# Patient Record
Sex: MALE | Race: WHITE | Employment: OTHER | URBAN - METROPOLITAN AREA
[De-identification: names, ages, dates, MRNs, and addresses within clinical notes are randomized per-mention and may not be internally consistent; named-entity substitution may affect disease eponyms.]

---

## 2017-04-11 ENCOUNTER — HOSPITAL ENCOUNTER (OUTPATIENT)
Dept: CT IMAGING | Facility: HOSPITAL | Age: 52
Discharge: HOME | End: 2017-04-11
Attending: SURGERY
Payer: MEDICARE

## 2017-04-11 ENCOUNTER — APPOINTMENT (OUTPATIENT)
Dept: LAB | Facility: HOSPITAL | Age: 52
End: 2017-04-11
Attending: SURGERY
Payer: MEDICARE

## 2017-04-11 DIAGNOSIS — C78.6 PERITONEAL CARCINOMATOSIS (HCC): ICD-10-CM

## 2017-04-11 DIAGNOSIS — M10.9 GOUTY ARTHRITIS: ICD-10-CM

## 2017-04-11 PROCEDURE — 84550 ASSAY OF BLOOD/URIC ACID: CPT

## 2017-04-11 PROCEDURE — 74177 CT ABD & PELVIS W/CONTRAST: CPT

## 2017-04-11 PROCEDURE — 86304 IMMUNOASSAY TUMOR CA 125: CPT

## 2017-04-11 PROCEDURE — 36415 COLL VENOUS BLD VENIPUNCTURE: CPT

## 2017-04-11 PROCEDURE — 86301 IMMUNOASSAY TUMOR CA 19-9: CPT

## 2017-04-11 PROCEDURE — 80053 COMPREHEN METABOLIC PANEL: CPT

## 2017-04-11 PROCEDURE — 82378 CARCINOEMBRYONIC ANTIGEN: CPT

## 2017-04-26 ENCOUNTER — TELEPHONE (OUTPATIENT)
Dept: SURGERY | Facility: HOSPITAL | Age: 52
End: 2017-04-26

## 2017-04-26 DIAGNOSIS — G89.29 CHRONIC CHEST WALL PAIN: Primary | ICD-10-CM

## 2017-04-26 DIAGNOSIS — R07.89 CHRONIC CHEST WALL PAIN: Primary | ICD-10-CM

## 2017-04-26 PROCEDURE — 86200 CCP ANTIBODY: CPT | Performed by: INTERNAL MEDICINE

## 2017-04-26 NOTE — TELEPHONE ENCOUNTER
Talked with patient re CT  He is seeing me tomorrow  He asked for medical lucius and I declined  Pain clinic referral suggested

## 2017-05-03 ENCOUNTER — OFFICE VISIT (OUTPATIENT)
Dept: SURGERY | Facility: CLINIC | Age: 52
End: 2017-05-03

## 2017-05-03 VITALS
RESPIRATION RATE: 18 BRPM | SYSTOLIC BLOOD PRESSURE: 131 MMHG | OXYGEN SATURATION: 99 % | TEMPERATURE: 99 F | WEIGHT: 275.19 LBS | DIASTOLIC BLOOD PRESSURE: 85 MMHG | HEIGHT: 73 IN | HEART RATE: 68 BPM | BODY MASS INDEX: 36.47 KG/M2

## 2017-05-03 DIAGNOSIS — C78.6 PSEUDOMYXOMA PERITONEI (HCC): Primary | ICD-10-CM

## 2017-05-03 PROCEDURE — 99213 OFFICE O/P EST LOW 20 MIN: CPT | Performed by: SURGERY

## 2017-05-03 NOTE — PROGRESS NOTES
[facility] Surgical Oncology    Patient Name:  Goldie Mc   YOB: 1965   Gender:  Male   Appt Date:  5/3/2017   Provider:  Yessica Vargas MD   Insurance:  MEDICARE PART A&B     Bebe Hilliard MD 11/19/2015: MRI--->Stable serosal T2 hyperintense lesion on the medial aspect of right lobe of the liver measuring 20 x 20 mm. This lesion demonstrates restricted diffusion and rim enhancement.  This also stable lesion on the right posterior liver near the History:  Reviewed:  Past Medical History   Diagnosis Date   • GERD    • GOUT    • Calculus of kidney    • Other and unspecified personal history of malignant neoplasm       Pseuodmyxoma Peritoneii stage IV s/p DAISY, omenentectomy, right hemicolectomy, an • Cystoscopy,remv calculus,simple  11/20/2012     Procedure: CYSTOSCOPY WITH REMOVAL OF STENT;  Surgeon: Thelma Land MD;  Location: [address] [place] [address]   • Patient with preoperative order for iv antibiotic surgical site infect  11/20/2012     Proc Procedure: LUMBAR EPIDURAL;  Surgeon: Lennice Felty, MD;  Location: [facility] [place] [facility] PAIN MANAG[facility]   • Fluor gid & loclzj ndl/cath spi dx/ther njx  4/15/2014     Procedure: LUMBAR EPIDURAL;  Surgeon: Lennice Felty, MD;  Location: [address] [place] [address] • Patient withough preoperative order for iv antibiotic surgical site infection prophylaxis.  N/A 1/25/2016     Procedure: COLONOSCOPY, POSSIBLE BIOPSY, POSSIBLE POLYPECTOMY 80769;  Surgeon: Aby Brewster MD;  Location: [facility] Abdomen: Inspection and Palpation: no masses, tenderness (no guarding, no rebound), or CVA tenderness and soft and non-distended. Liver: no hepatomegaly. Spleen: no splenomegaly. Hernia: none palpable. Musculoskeletal: Extremities: no edema.    Skin: no j

## 2017-05-12 ENCOUNTER — OFFICE VISIT (OUTPATIENT)
Dept: SURGERY | Facility: CLINIC | Age: 52
End: 2017-05-12

## 2017-05-12 VITALS
DIASTOLIC BLOOD PRESSURE: 76 MMHG | HEIGHT: 72 IN | WEIGHT: 275 LBS | HEART RATE: 85 BPM | RESPIRATION RATE: 18 BRPM | SYSTOLIC BLOOD PRESSURE: 132 MMHG | OXYGEN SATURATION: 96 % | BODY MASS INDEX: 37.25 KG/M2

## 2017-05-12 DIAGNOSIS — C78.6 PSEUDOMYXOMA PERITONEI (HCC): Primary | ICD-10-CM

## 2017-05-12 PROCEDURE — 99204 OFFICE O/P NEW MOD 45 MIN: CPT | Performed by: ANESTHESIOLOGY

## 2017-05-12 NOTE — PROGRESS NOTES
HPI:    Patient ID: Thomas Price is a 45year old male.     HPI    Review of Systems         Current Outpatient Prescriptions:  ULORIC 80 MG Oral Tab TAKE 1 TABLET BY MOUTH EVERY DAY Disp: 90 tablet Rfl: 1   lidocaine (LIDODERM) 5 % External Patch Pl

## 2017-05-12 NOTE — PATIENT INSTRUCTIONS
Refill policies:    • Allow 2 business days for refills; controlled substances may take longer.   • Contact your pharmacy at least 5 days prior to running out of medication and have them send an electronic request or submit request through the “request re insurance carrier to obtain pre-certification or prior authorization. Unfortunately, [facility] has seen an increase in denial of payment even though the procedure/test has been pre-certified.   You are strongly encouraged to contact your insurance carrier to v

## 2017-05-13 NOTE — PROGRESS NOTES
Name: Lubna Del Rio   : [birth year]   DOS: 2017     Chief complaint: Abdominal pain.     History of present illness:  Lubna Del Rio is a 45year old male complaining of pain in the left side of the upper abdomen abutting the costal margin Extended Release 12 hour Abuse-Deterrent Take 1 tablet by mouth Q12H. Disp: 30 tablet Rfl: 0   Tadalafil (CIALIS) 20 MG Oral Tab Take 1 tablet by mouth daily as needed for Erectile Dysfunction.  Disp: 64 tablet Rfl: 0         Past Surgical History    GASTRO REMOVAL OF STENT;  Surgeon: Vanessa Ryan MD;  Location: [address] [place] [address]    PATIENT WITH PREOPERATIVE ORDER FOR IV ANTIBIOTIC SURGICAL SITE INFECT  11/20/2012    Comment Procedure: CYSTOSCOPY WITH REMOVAL OF STENT;  Surgeon: Vanessa Ryan MD; EPIDURAL;  Surgeon: Christa Germain MD;  Location: [address] [place] [address] NDL/CATH SPI DX/THER Anthony Ernesto Bender 83  4/15/2014    Comment Procedure: LUMBAR EPIDURAL;  Surgeon: Christa Germain MD;  Location: [address] [place] [address] PREOPERATIVE ORDER FOR IV ANTIBIOTIC SURGICAL SITE INFECTION PROPHYLAXIS.  N/A 1/25/2016    Comment Procedure: COLONOSCOPY, POSSIBLE BIOPSY, POSSIBLE POLYPECTOMY 51591;  Surgeon: Erica Hanna MD;  Location: [address] [place] [address] suicidal ideation. Physical examination: Caleb Ye is a 45year old male not in acute distress  /76 mmHg  Pulse 85  Resp 18  Ht 72\"  Wt 275 lb  BMI 37.29 kg/m2  SpO2 96%   The patient is awake, alert, oriented and corporative.  She has a normal af Assessment:   . Plan: At this point patient is going to be seen by his oncologist and and surgeon to see if he will need surgery for the tumor in the stomach. After surgery his pain might get better because tumor is pressing on the costal margin.

## 2017-05-17 ENCOUNTER — TELEPHONE (OUTPATIENT)
Dept: SURGERY | Facility: CLINIC | Age: 52
End: 2017-05-17

## 2017-05-17 NOTE — TELEPHONE ENCOUNTER
Received notification that Rx was not covered by insurance. Alternative #1 is covered. New rx is needed.      Diclofenac Sodium 1.5% Solution #300ml (2 bottles)--apply 40 drops (1.3 ml) to each painful area (up to 2 areas) three to four times daily (max 10

## 2017-05-18 NOTE — TELEPHONE ENCOUNTER
Compound Pain Cream faxed to [facility], fax confirmation received.

## 2017-08-03 ENCOUNTER — APPOINTMENT (OUTPATIENT)
Dept: LAB | Facility: HOSPITAL | Age: 52
End: 2017-08-03
Attending: SURGERY
Payer: MEDICARE

## 2017-08-03 ENCOUNTER — HOSPITAL ENCOUNTER (OUTPATIENT)
Dept: CT IMAGING | Facility: HOSPITAL | Age: 52
Discharge: HOME | End: 2017-08-03
Attending: SURGERY
Payer: MEDICARE

## 2017-08-03 DIAGNOSIS — C78.6 PSEUDOMYXOMA PERITONEI (HCC): ICD-10-CM

## 2017-08-03 LAB
CANCER AG 125 (CA125): 4.8 U/ML (ref ?–35)
CARBOHYDRATE AG 19-9: 18.9 U/ML (ref 1.2–35)
CEA: 5.5 NG/ML (ref 0.5–5)

## 2017-08-03 PROCEDURE — 86301 IMMUNOASSAY TUMOR CA 19-9: CPT

## 2017-08-03 PROCEDURE — 36415 COLL VENOUS BLD VENIPUNCTURE: CPT

## 2017-08-03 PROCEDURE — 74177 CT ABD & PELVIS W/CONTRAST: CPT | Performed by: SURGERY

## 2017-08-03 PROCEDURE — 86304 IMMUNOASSAY TUMOR CA 125: CPT

## 2017-08-03 PROCEDURE — 82378 CARCINOEMBRYONIC ANTIGEN: CPT

## 2017-08-09 ENCOUNTER — OFFICE VISIT (OUTPATIENT)
Dept: SURGERY | Facility: CLINIC | Age: 52
End: 2017-08-09

## 2017-08-09 VITALS
DIASTOLIC BLOOD PRESSURE: 84 MMHG | HEIGHT: 73 IN | BODY MASS INDEX: 37.11 KG/M2 | TEMPERATURE: 98 F | WEIGHT: 280 LBS | OXYGEN SATURATION: 94 % | RESPIRATION RATE: 18 BRPM | SYSTOLIC BLOOD PRESSURE: 134 MMHG | HEART RATE: 56 BPM

## 2017-08-09 DIAGNOSIS — C78.6 PSEUDOMYXOMA PERITONEI (HCC): Primary | ICD-10-CM

## 2017-08-09 PROCEDURE — 99213 OFFICE O/P EST LOW 20 MIN: CPT | Performed by: SURGERY

## 2017-08-09 RX ORDER — LIDOCAINE AND PRILOCAINE 25; 25 MG/G; MG/G
CREAM TOPICAL
Refills: 11 | COMMUNITY
Start: 2017-05-18

## 2017-08-09 RX ORDER — OXYCODONE HYDROCHLORIDE AND ACETAMINOPHEN 5; 325 MG/1; MG/1
TABLET ORAL
COMMUNITY
Start: 2014-11-28 | End: 2017-10-18 | Stop reason: ALTCHOICE

## 2017-08-09 NOTE — PROGRESS NOTES
[facility] Surgical Oncology    Patient Name:  Neha Roger   YOB: 1965   Gender:  Male   Appt Date:  8/9/2017   Provider:  Dhruv Quintero MD   Insurance:  MEDICARE PART A&B     Sharona Douglas MD 11/19/2015: MRI--->Stable serosal T2 hyperintense lesion on the medial aspect of right lobe of the liver measuring 20 x 20 mm. This lesion demonstrates restricted diffusion and rim enhancement.  This also stable lesion on the right posterior liver near the /84 (BP Location: Right arm, Patient Position: Sitting, Cuff Size: adult)   Pulse 56   Temp 97.7 °F (36.5 °C) (Tympanic)   Resp 18   Ht 1.854 m (6' 1\")   Wt 127 kg (280 lb)   SpO2 94%   BMI 36.94 kg/m²      Medications Reviewed:    Current Outpatien polyp; repeat 5 yrs (hyperplastic but hx of adenoma)   • Colonoscopy,biopsy N/A 1/25/2016    Procedure: COLONOSCOPY, POSSIBLE BIOPSY, POSSIBLE POLYPECTOMY 78016;  Surgeon: Aby Brewster MD;  Location: [address] [place] [address]   • Colonoscopy,jayden le Procedure: LUMBAR EPIDURAL;  Surgeon: Taj Parra MD;  Location: [facility] FOR PAIN MANAGEMENT   • Injection, w/wo contrast, dx/therapeutic substance, epidural/subarachnoid; lumbar/sacral  4/29/2014    Procedure: LUMBAR EPIDURAL;  Surgeon: Ann Sharif Procedure: LUMBAR EPIDURAL;  Surgeon: Michelle Yang MD;  Location: [address] [place] [address]   • Patient documented not to have experienced any of the following events N/A 1/25/2016    Procedure: COLONOSCOPY, POSSIBLE BIOPSY, POSSIBLE POLYPECTOMY 4 Smoking status: Former Smoker                                                              Packs/day: 1.50      Years: 16.00        Types: Cigarettes, Cigars     Quit date: 12/6/2016  Smokeless tobacco: Never Used                      Comment: quit Dec 201 BOWEL/MESENTERY:  There is increase in the size of the cystic lesion deforming the inferior body of the stomach. This measures 4.4 x 3.5 x 3.2 cm, previously measured 3.8 x 3.2 x 2.7 cm.  A cystic lesion along the anterior capsule of the inferior right   lo

## 2017-08-28 ENCOUNTER — CHARTING TRANS (OUTPATIENT)
Dept: OTHER | Age: 52
End: 2017-08-28

## 2017-08-28 ASSESSMENT — PAIN SCALES - GENERAL: PAINLEVEL_OUTOF10: 3

## 2017-10-05 DIAGNOSIS — C78.6 PSEUDOMYXOMA PERITONEI (HCC): Primary | ICD-10-CM

## 2017-10-10 ENCOUNTER — APPOINTMENT (OUTPATIENT)
Dept: LAB | Facility: HOSPITAL | Age: 52
End: 2017-10-10
Attending: SURGERY
Payer: MEDICARE

## 2017-10-10 ENCOUNTER — HOSPITAL ENCOUNTER (OUTPATIENT)
Dept: CT IMAGING | Facility: HOSPITAL | Age: 52
Discharge: HOME | End: 2017-10-10
Attending: SURGERY
Payer: MEDICARE

## 2017-10-10 DIAGNOSIS — C78.6 PSEUDOMYXOMA PERITONEI (HCC): ICD-10-CM

## 2017-10-10 PROCEDURE — 86301 IMMUNOASSAY TUMOR CA 19-9: CPT

## 2017-10-10 PROCEDURE — 36415 COLL VENOUS BLD VENIPUNCTURE: CPT

## 2017-10-10 PROCEDURE — 86304 IMMUNOASSAY TUMOR CA 125: CPT

## 2017-10-10 PROCEDURE — 82378 CARCINOEMBRYONIC ANTIGEN: CPT

## 2017-10-10 PROCEDURE — 74177 CT ABD & PELVIS W/CONTRAST: CPT | Performed by: SURGERY

## 2017-10-18 ENCOUNTER — OFFICE VISIT (OUTPATIENT)
Dept: SURGERY | Facility: CLINIC | Age: 52
End: 2017-10-18

## 2017-10-18 VITALS
HEART RATE: 76 BPM | HEIGHT: 73 IN | RESPIRATION RATE: 16 BRPM | BODY MASS INDEX: 36.34 KG/M2 | TEMPERATURE: 98 F | WEIGHT: 274.19 LBS | DIASTOLIC BLOOD PRESSURE: 87 MMHG | SYSTOLIC BLOOD PRESSURE: 128 MMHG

## 2017-10-18 DIAGNOSIS — C78.6 PSEUDOMYXOMA PERITONEI (HCC): Primary | ICD-10-CM

## 2017-10-18 PROCEDURE — 99214 OFFICE O/P EST MOD 30 MIN: CPT | Performed by: SURGERY

## 2017-10-18 NOTE — PROGRESS NOTES
[facility] Surgical Oncology    Patient Name:  Kenia Mullen   YOB: 1965   Gender:  Male   Appt Date:  10/18/2017   Provider:  Bere Wong MD   Insurance:  MEDICARE PART A&B     Vishal Reyna MD 5/12/2015: CT--->Index subcapsular focus along posterior margin of right lobe 1 cm by 0.6 cm. Previously measured 1.1 X 0.8 cm. Additional implant along the posterior margin of the liver right lobe 1.5 x 0.9 cm (previously 1.8 x 1.3 cm).  Several small martín /87 (BP Location: Left arm, Patient Position: Sitting, Cuff Size: adult)   Pulse 76   Temp 98.4 °F (36.9 °C)   Resp 16   Ht 1.854 m (6' 1\")   Wt 124.4 kg (274 lb 3.2 oz)   BMI 36.18 kg/m²      Medications Reviewed:    Current Outpatient Prescription Procedure: COLONOSCOPY, POSSIBLE BIOPSY, POSSIBLE POLYPECTOMY 73292;  Surgeon: Shay Boogie MD;  Location: [address] [place] [address]   • Colonoscopy,jayden osorio,snare  11/5/2012    Procedure: ESOPHAGOGASTRODUODENOSCOPY, COLONOSCOPY, POSSIBLE BIOPSY, P • Injection, w/wo contrast, dx/therapeutic substance, epidural/subarachnoid; lumbar/sacral  4/29/2014    Procedure: LUMBAR EPIDURAL;  Surgeon: Cherylene Deters, MD;  Location: [facility]   • Ir ureter tube removal via ileal conduit     • • Patient documented not to have experienced any of the following events N/A 1/25/2016    Procedure: COLONOSCOPY, POSSIBLE BIOPSY, POSSIBLE POLYPECTOMY 21447;  Surgeon: Shay Boogie MD;  Location: [address] [place] [address]   • Patient with preoperati Packs/day: 1.50      Years: 16.00        Types: Cigarettes, Cigars     Quit date: 12/6/2016  Smokeless tobacco: Never Used                      Comment: quit Dec 2016  Alcohol use:  No               Reviewed:  Family History   Problem Relation Age of Ons 10/10/17: CEA  4.9;  CA19.9  23.5;    2.6     Procedure(s):  None     Assessment / Plan:  (C78.6) Pseudomyxoma peritonei     CT findings reviewed. There appears progression of disease  Options and recommendations discussed.   The case was discussed at I will STOP taking the medications listed below when I get home from the hospital:  None

## 2018-01-05 ENCOUNTER — HOSPITAL ENCOUNTER (OUTPATIENT)
Dept: CT IMAGING | Facility: HOSPITAL | Age: 53
Discharge: HOME | End: 2018-01-05
Attending: SURGERY
Payer: MEDICARE

## 2018-01-05 ENCOUNTER — APPOINTMENT (OUTPATIENT)
Dept: LAB | Facility: HOSPITAL | Age: 53
End: 2018-01-05
Attending: SURGERY
Payer: MEDICARE

## 2018-01-05 ENCOUNTER — CHARTING TRANS (OUTPATIENT)
Dept: OTHER | Age: 53
End: 2018-01-05

## 2018-01-05 DIAGNOSIS — C78.6 PSEUDOMYXOMA PERITONEI (HCC): ICD-10-CM

## 2018-01-05 LAB — CEA: 3.8 NG/ML (ref 0.5–5)

## 2018-01-05 PROCEDURE — 82378 CARCINOEMBRYONIC ANTIGEN: CPT

## 2018-01-05 PROCEDURE — 74177 CT ABD & PELVIS W/CONTRAST: CPT | Performed by: SURGERY

## 2018-01-05 PROCEDURE — 36415 COLL VENOUS BLD VENIPUNCTURE: CPT

## 2018-01-05 ASSESSMENT — PAIN SCALES - GENERAL: PAINLEVEL_OUTOF10: 0

## 2018-01-17 ENCOUNTER — OFFICE VISIT (OUTPATIENT)
Dept: SURGERY | Facility: CLINIC | Age: 53
End: 2018-01-17

## 2018-01-17 ENCOUNTER — NURSE ONLY (OUTPATIENT)
Dept: HEMATOLOGY/ONCOLOGY | Facility: HOSPITAL | Age: 53
End: 2018-01-17
Attending: SURGERY
Payer: MEDICARE

## 2018-01-17 VITALS
DIASTOLIC BLOOD PRESSURE: 79 MMHG | HEIGHT: 73 IN | WEIGHT: 268 LBS | TEMPERATURE: 97 F | HEART RATE: 66 BPM | OXYGEN SATURATION: 95 % | BODY MASS INDEX: 35.52 KG/M2 | SYSTOLIC BLOOD PRESSURE: 124 MMHG | RESPIRATION RATE: 16 BRPM

## 2018-01-17 DIAGNOSIS — C78.6 PSEUDOMYXOMA PERITONEI (HCC): Primary | ICD-10-CM

## 2018-01-17 DIAGNOSIS — C78.6 PSEUDOMYXOMA PERITONEI (HCC): ICD-10-CM

## 2018-01-17 LAB — CEA: 4.1 NG/ML (ref 0.5–5)

## 2018-01-17 PROCEDURE — 36415 COLL VENOUS BLD VENIPUNCTURE: CPT

## 2018-01-17 PROCEDURE — 99213 OFFICE O/P EST LOW 20 MIN: CPT | Performed by: SURGERY

## 2018-01-17 PROCEDURE — 82378 CARCINOEMBRYONIC ANTIGEN: CPT

## 2018-01-17 NOTE — PROGRESS NOTES
[facility] Surgical Oncology    Patient Name:  Sotero Damico   YOB: 1965   Gender:  Male   Appt Date:  1/17/2018   Provider:  Cici Cheung MD   Insurance:  [address] [place] [address]  Primary Care [facility] 3.6  11/19/2015: MRI--->Stable serosal T2 hyperintense lesion on the medial aspect of right lobe of the liver measuring 20 x 20 mm. This lesion demonstrates restricted diffusion and rim enhancement.  This also stable lesion on the right posterior liver near Location: Right arm, Patient Position: Sitting, Cuff Size: adult)   Pulse 66   Temp (!) 97.4 °F (36.3 °C) (Tympanic)   Resp 16   Ht 1.854 m (6' 1\")   Wt 121.6 kg (268 lb)   SpO2 95%   BMI 35.36 kg/m²      Medications Reviewed:    Current Outpatient Prescr polypectomy  11/12    polyps; no cancer; repeat 3 yrs   • Colonoscopy & polypectomy  1/16    polyp; repeat 5 yrs (hyperplastic but hx of adenoma)   • Colonoscopy,biopsy N/A 1/25/2016    Procedure: COLONOSCOPY, POSSIBLE BIOPSY, POSSIBLE POLYPECTOMY 04483; epidural/subarachnoid; lumbar/sacral  4/15/2014    Procedure: LUMBAR EPIDURAL;  Surgeon: Lennice Felty, MD;  Location: [facility]IN [facility]   • Injection, w/wo contrast, dx/therapeutic substance, epidural/subarachnoid; lumbar/sacral  4/29/2014 4/29/2014    Procedure: LUMBAR EPIDURAL;  Surgeon: Lennice Felty, MD;  Location: [address] [place] [address]   • Patient documented not to have experienced any of the following events N/A 1/25/2016    Procedure: COLONOSCOPY, POSSIBLE BIOPSY, POSSIBLE P History:  Smoking status: Former Smoker                                                              Packs/day: 1.50      Years: 16.00        Types: Cigarettes, Cigars     Quit date: 12/6/2016  Smokeless tobacco: Never Used                      Comment: qu retroperitoneal lymph nodes are stable. No significant change since prior exam.  Nonobstructing calculi in both kidneys are stable.   1/5/17 CEA: 3.8   and CA 19-9 pending     Procedure(s):  None     Assessment / Plan:  (C78.6) Pseudomyxoma periton

## 2018-02-12 DIAGNOSIS — M79.2 NEUROPATHIC PAIN: ICD-10-CM

## 2018-02-12 RX ORDER — LIDOCAINE 50 MG/G
PATCH TOPICAL
Qty: 30 PATCH | Refills: 0 | Status: SHIPPED | OUTPATIENT
Start: 2018-02-12 | End: 2019-10-23 | Stop reason: ALTCHOICE

## 2018-02-12 RX ORDER — LIDOCAINE 50 MG/G
PATCH TOPICAL
Qty: 30 PATCH | Refills: 0 | Status: SHIPPED | OUTPATIENT
Start: 2018-02-12 | End: 2018-07-11

## 2018-02-13 ENCOUNTER — CHARTING TRANS (OUTPATIENT)
Dept: OTHER | Age: 53
End: 2018-02-13

## 2018-02-13 ASSESSMENT — PAIN SCALES - GENERAL: PAINLEVEL_OUTOF10: 0

## 2018-03-19 ENCOUNTER — CHARTING TRANS (OUTPATIENT)
Dept: OTHER | Age: 53
End: 2018-03-19

## 2018-03-19 ASSESSMENT — PAIN SCALES - GENERAL: PAINLEVEL_OUTOF10: 3

## 2018-07-06 ENCOUNTER — TELEPHONE (OUTPATIENT)
Dept: SURGERY | Facility: CLINIC | Age: 53
End: 2018-07-06

## 2018-07-06 DIAGNOSIS — C48.2 PERITONEAL CARCINOMA (HCC): Primary | ICD-10-CM

## 2018-07-06 DIAGNOSIS — C78.6 PSEUDOMYXOMA PERITONEI (HCC): ICD-10-CM

## 2018-07-06 NOTE — TELEPHONE ENCOUNTER
Confirmed with patient he will have imaging and tumor markers complete prior to office visit with Dr. Dashawn Pat on 7/11/18. Patient verbalized understanding of plan.

## 2018-07-10 ENCOUNTER — LAB ENCOUNTER (OUTPATIENT)
Dept: LAB | Facility: HOSPITAL | Age: 53
End: 2018-07-10
Attending: SURGERY
Payer: MEDICARE

## 2018-07-10 ENCOUNTER — HOSPITAL ENCOUNTER (OUTPATIENT)
Dept: CT IMAGING | Facility: HOSPITAL | Age: 53
Discharge: HOME | End: 2018-07-10
Attending: SURGERY
Payer: MEDICARE

## 2018-07-10 DIAGNOSIS — N18.9 CHRONIC RENAL IMPAIRMENT, UNSPECIFIED CKD STAGE: ICD-10-CM

## 2018-07-10 DIAGNOSIS — C78.89 SECONDARY MALIGNANT NEOPLASM OF OTHER DIGESTIVE ORGANS (HCC): ICD-10-CM

## 2018-07-10 DIAGNOSIS — C48.2 PERITONEAL CARCINOMA (HCC): Primary | ICD-10-CM

## 2018-07-10 DIAGNOSIS — C78.6 PSEUDOMYXOMA PERITONEI (HCC): ICD-10-CM

## 2018-07-10 DIAGNOSIS — Z08 ENCOUNTER FOR FOLLOW-UP EXAMINATION AFTER COMPLETED TREATMENT FOR MALIGNANT NEOPLASM: ICD-10-CM

## 2018-07-10 DIAGNOSIS — C18.1 ADENOCARCINOMA OF APPENDIX (HCC): ICD-10-CM

## 2018-07-10 DIAGNOSIS — M10.9 GOUTY ARTHRITIS: ICD-10-CM

## 2018-07-10 DIAGNOSIS — C48.2 PERITONEAL CARCINOMA (HCC): ICD-10-CM

## 2018-07-10 LAB
ALBUMIN SERPL-MCNC: 3.5 G/DL (ref 3.5–4.8)
ALP LIVER SERPL-CCNC: 106 U/L (ref 45–117)
ALT SERPL-CCNC: 27 U/L (ref 17–63)
AST SERPL-CCNC: 17 U/L (ref 15–41)
BILIRUB SERPL-MCNC: 0.7 MG/DL (ref 0.1–2)
BUN BLD-MCNC: 12 MG/DL (ref 8–20)
CALCIUM BLD-MCNC: 9.3 MG/DL (ref 8.3–10.3)
CANCER AG 125: 3.2 U/ML (ref ?–35)
CARBOHYDRATE AG 19-9: 11.5 U/ML (ref ?–37)
CEA: 3.8 NG/ML (ref 0.5–5)
CHLORIDE: 106 MMOL/L (ref 101–111)
CO2: 28 MMOL/L (ref 22–32)
CREAT BLD-MCNC: 1.33 MG/DL (ref 0.7–1.3)
CREAT SERPL-MCNC: 1.3 MG/DL (ref 0.7–1.3)
GLUCOSE BLD-MCNC: 117 MG/DL (ref 70–99)
M PROTEIN MFR SERPL ELPH: 7.8 G/DL (ref 6.1–8.3)
POTASSIUM SERPL-SCNC: 3.9 MMOL/L (ref 3.6–5.1)
SODIUM SERPL-SCNC: 140 MMOL/L (ref 136–144)
URIC ACID: 4.8 MG/DL (ref 2.4–8.7)

## 2018-07-10 PROCEDURE — 86301 IMMUNOASSAY TUMOR CA 19-9: CPT

## 2018-07-10 PROCEDURE — 86304 IMMUNOASSAY TUMOR CA 125: CPT

## 2018-07-10 PROCEDURE — 82565 ASSAY OF CREATININE: CPT

## 2018-07-10 PROCEDURE — 36415 COLL VENOUS BLD VENIPUNCTURE: CPT

## 2018-07-10 PROCEDURE — 80053 COMPREHEN METABOLIC PANEL: CPT

## 2018-07-10 PROCEDURE — 74177 CT ABD & PELVIS W/CONTRAST: CPT | Performed by: SURGERY

## 2018-07-10 PROCEDURE — 84550 ASSAY OF BLOOD/URIC ACID: CPT

## 2018-07-10 PROCEDURE — 82378 CARCINOEMBRYONIC ANTIGEN: CPT

## 2018-07-11 ENCOUNTER — OFFICE VISIT (OUTPATIENT)
Dept: SURGERY | Facility: CLINIC | Age: 53
End: 2018-07-11

## 2018-07-11 VITALS
OXYGEN SATURATION: 95 % | HEIGHT: 73 IN | RESPIRATION RATE: 20 BRPM | DIASTOLIC BLOOD PRESSURE: 89 MMHG | SYSTOLIC BLOOD PRESSURE: 145 MMHG | WEIGHT: 260 LBS | BODY MASS INDEX: 34.46 KG/M2 | TEMPERATURE: 97 F | HEART RATE: 78 BPM

## 2018-07-11 DIAGNOSIS — C18.1 ADENOCARCINOMA OF APPENDIX (HCC): ICD-10-CM

## 2018-07-11 DIAGNOSIS — C78.6 PSEUDOMYXOMA PERITONEI (HCC): Primary | ICD-10-CM

## 2018-07-11 DIAGNOSIS — Z85.09 PERSONAL HISTORY OF MALIGNANT NEOPLASM OF OTHER DIGESTIVE ORGANS: ICD-10-CM

## 2018-07-11 DIAGNOSIS — C48.2 MALIGNANT NEOPLASM OF PERITONEUM (HCC): ICD-10-CM

## 2018-07-11 PROCEDURE — 99213 OFFICE O/P EST LOW 20 MIN: CPT | Performed by: SURGERY

## 2018-07-11 RX ORDER — GABAPENTIN 300 MG/1
300 CAPSULE ORAL NIGHTLY
Refills: 10 | COMMUNITY
Start: 2018-07-08 | End: 2019-07-31 | Stop reason: ALTCHOICE

## 2018-07-11 NOTE — PROGRESS NOTES
[facility] Surgical Oncology    Patient Name:  Bobby Camejo   YOB: 1965   Gender:  Male   Appt Date:  7/11/2018   Provider:  Annetta Bernardo MD   Insurance:  [address] [place] [address]  [facility], 3.6  11/19/2015: MRI--->Stable serosal T2 hyperintense lesion on the medial aspect of right lobe of the liver measuring 20 x 20 mm. This lesion demonstrates restricted diffusion and rim enhancement.  This also stable lesion on the right posterior liver near Minimal increase in size of bilobed cystic structure along the greater curvature of the stomach likely  related to pseudomyxoma peritonei. Small lesions along the right lobe of the liver are stable.   7/10/18: CEA: 3.8, CA 19-9: 11.5, CA-125: 3.2  Patient NAUSEA AND VOMITING     History:  Reviewed:  Past Medical History:   Diagnosis Date   • Calculus of kidney    • GERD    • GOUT    • Other and unspecified personal history of malignant neoplasm      Pseuodmyxoma Peritoneii stage IV s/p DAISY, omenentectomy LITHOTRIPSY WITH CYSTOSCOPY, STENT PLACEMENT;  Surgeon: Chanel Blackwell MD;  Location: [place] [address].  11/12    normal   • Injection, w/wo contrast, dx/therapeutic substance, epidural/subarachnoid; lumbar/sacral  3/27/2014    Pro of the following events  3/27/2014    Procedure: LUMBAR EPIDURAL;  Surgeon: Claudia Cowan MD;  Location: [address] [place] [address]   • Patient documented not to have experienced any of the following events  4/15/2014    Procedure: LUMBAR EPIDURAL; prophylaxis.  N/A 1/25/2016    Procedure: COLONOSCOPY, POSSIBLE BIOPSY, POSSIBLE POLYPECTOMY 34998;  Surgeon: Germán Wilkins MD;  Location: [address] [place] [address]   • Upper gi endoscopy,diagnosis  11/5/2012    Procedure: ESOPHAGOGASTRODUODENOSCOPY, C Document Review:  7/10/18: CT abdomen/pelvis: Minimal increase in size of bilobed cystic structure along the greater curvature of the stomach likely  related to pseudomyxoma peritonei. Small lesions along the right lobe of the liver are stable.     7/1

## 2018-11-01 VITALS
BODY MASS INDEX: 35.25 KG/M2 | TEMPERATURE: 98.1 F | WEIGHT: 266 LBS | HEART RATE: 68 BPM | HEIGHT: 73 IN | OXYGEN SATURATION: 96 % | RESPIRATION RATE: 16 BRPM

## 2018-11-01 VITALS — WEIGHT: 266 LBS | RESPIRATION RATE: 16 BRPM

## 2018-11-02 VITALS
RESPIRATION RATE: 17 BRPM | OXYGEN SATURATION: 98 % | WEIGHT: 272 LBS | HEART RATE: 71 BPM | TEMPERATURE: 98.1 F | HEIGHT: 73 IN | BODY MASS INDEX: 36.05 KG/M2

## 2018-11-03 VITALS
SYSTOLIC BLOOD PRESSURE: 120 MMHG | HEIGHT: 73 IN | OXYGEN SATURATION: 96 % | WEIGHT: 277 LBS | DIASTOLIC BLOOD PRESSURE: 78 MMHG | BODY MASS INDEX: 36.71 KG/M2 | HEART RATE: 84 BPM

## 2019-01-01 ENCOUNTER — EXTERNAL RECORD (OUTPATIENT)
Dept: HEALTH INFORMATION MANAGEMENT | Facility: OTHER | Age: 54
End: 2019-01-01

## 2019-01-03 ENCOUNTER — TELEPHONE (OUTPATIENT)
Dept: SCHEDULING | Age: 54
End: 2019-01-03

## 2019-01-03 RX ORDER — OXYCODONE HCL 40 MG/1
TABLET, FILM COATED, EXTENDED RELEASE ORAL EVERY 12 HOURS SCHEDULED
COMMUNITY
Start: 2018-07-26 | End: 2019-01-03 | Stop reason: SDUPTHER

## 2019-01-04 RX ORDER — OXYCODONE HCL 40 MG/1
40 TABLET, FILM COATED, EXTENDED RELEASE ORAL EVERY 12 HOURS SCHEDULED
Qty: 30 TABLET | Refills: 0 | Status: SHIPPED | OUTPATIENT
Start: 2019-01-04 | End: 2019-03-29 | Stop reason: SDUPTHER

## 2019-01-04 RX ORDER — GABAPENTIN 300 MG/1
CAPSULE ORAL
Qty: 120 CAPSULE | Refills: 11 | Status: SHIPPED | OUTPATIENT
Start: 2019-01-04 | End: 2019-11-07 | Stop reason: ALTCHOICE

## 2019-01-05 ENCOUNTER — HOSPITAL ENCOUNTER (OUTPATIENT)
Dept: CT IMAGING | Facility: HOSPITAL | Age: 54
Discharge: HOME | End: 2019-01-05
Attending: SURGERY
Payer: MEDICARE

## 2019-01-05 ENCOUNTER — LAB ENCOUNTER (OUTPATIENT)
Dept: LAB | Facility: HOSPITAL | Age: 54
End: 2019-01-05
Attending: INTERNAL MEDICINE
Payer: MEDICARE

## 2019-01-05 DIAGNOSIS — C18.1 ADENOCARCINOMA OF APPENDIX (HCC): ICD-10-CM

## 2019-01-05 DIAGNOSIS — M10.9 GOUTY ARTHRITIS: ICD-10-CM

## 2019-01-05 DIAGNOSIS — Z85.09 PERSONAL HISTORY OF MALIGNANT NEOPLASM OF OTHER DIGESTIVE ORGANS: ICD-10-CM

## 2019-01-05 DIAGNOSIS — C78.6 PSEUDOMYXOMA PERITONEI (HCC): ICD-10-CM

## 2019-01-05 DIAGNOSIS — C48.2 MALIGNANT NEOPLASM OF PERITONEUM (HCC): ICD-10-CM

## 2019-01-05 LAB
ALBUMIN SERPL-MCNC: 3.8 G/DL (ref 3.1–4.5)
ALBUMIN/GLOB SERPL: 0.9 {RATIO} (ref 1–2)
ALP LIVER SERPL-CCNC: 89 U/L (ref 45–117)
ALT SERPL-CCNC: 22 U/L (ref 17–63)
ANION GAP SERPL CALC-SCNC: 3 MMOL/L (ref 0–18)
AST SERPL-CCNC: 16 U/L (ref 15–41)
BASOPHILS # BLD AUTO: 0.07 X10(3) UL (ref 0–0.1)
BASOPHILS NFR BLD AUTO: 0.7 %
BILIRUB SERPL-MCNC: 0.4 MG/DL (ref 0.1–2)
BUN BLD-MCNC: 14 MG/DL (ref 8–20)
BUN/CREAT SERPL: 10.2 (ref 10–20)
CALCIUM BLD-MCNC: 9.5 MG/DL (ref 8.3–10.3)
CANCER AG125 SERPL-ACNC: 3.8 U/ML (ref ?–35)
CANCER AG19-9 SERPL-ACNC: 11.5 U/ML (ref ?–37)
CEA SERPL-MCNC: 2.8 NG/ML (ref 0.5–5)
CHLORIDE SERPL-SCNC: 108 MMOL/L (ref 101–111)
CO2 SERPL-SCNC: 29 MMOL/L (ref 22–32)
CREAT BLD-MCNC: 1.37 MG/DL (ref 0.7–1.3)
EOSINOPHIL # BLD AUTO: 0.33 X10(3) UL (ref 0–0.3)
EOSINOPHIL NFR BLD AUTO: 3.1 %
ERYTHROCYTE [DISTWIDTH] IN BLOOD BY AUTOMATED COUNT: 12 % (ref 11.5–16)
GLOBULIN PLAS-MCNC: 4.1 G/DL (ref 2.8–4.4)
GLUCOSE BLD-MCNC: 75 MG/DL (ref 70–99)
HCT VFR BLD AUTO: 49.9 % (ref 37–53)
HGB BLD-MCNC: 16.8 G/DL (ref 13–17)
IMMATURE GRANULOCYTE COUNT: 0.03 X10(3) UL (ref 0–1)
IMMATURE GRANULOCYTE RATIO %: 0.3 %
LYMPHOCYTES # BLD AUTO: 3.07 X10(3) UL (ref 0.9–4)
LYMPHOCYTES NFR BLD AUTO: 28.9 %
M PROTEIN MFR SERPL ELPH: 7.9 G/DL (ref 6.4–8.2)
MCH RBC QN AUTO: 32.6 PG (ref 27–33.2)
MCHC RBC AUTO-ENTMCNC: 33.7 G/DL (ref 31–37)
MCV RBC AUTO: 96.7 FL (ref 80–99)
MONOCYTES # BLD AUTO: 1.08 X10(3) UL (ref 0.1–1)
MONOCYTES NFR BLD AUTO: 10.2 %
NEUTROPHIL ABS PRELIM: 6.05 X10 (3) UL (ref 1.3–6.7)
NEUTROPHILS # BLD AUTO: 6.05 X10(3) UL (ref 1.3–6.7)
NEUTROPHILS NFR BLD AUTO: 56.8 %
OSMOLALITY SERPL CALC.SUM OF ELEC: 289 MOSM/KG (ref 275–295)
PLATELET # BLD AUTO: 268 10(3)UL (ref 150–450)
POTASSIUM SERPL-SCNC: 5 MMOL/L (ref 3.6–5.1)
RBC # BLD AUTO: 5.16 X10(6)UL (ref 4.3–5.7)
RED CELL DISTRIBUTION WIDTH-SD: 43.5 FL (ref 35.1–46.3)
SODIUM SERPL-SCNC: 140 MMOL/L (ref 136–144)
URATE SERPL-MCNC: 4.8 MG/DL (ref 2.4–8.7)
WBC # BLD AUTO: 10.6 X10(3) UL (ref 4–13)

## 2019-01-05 PROCEDURE — 84550 ASSAY OF BLOOD/URIC ACID: CPT

## 2019-01-05 PROCEDURE — 80053 COMPREHEN METABOLIC PANEL: CPT

## 2019-01-05 PROCEDURE — 85025 COMPLETE CBC W/AUTO DIFF WBC: CPT

## 2019-01-05 PROCEDURE — 86304 IMMUNOASSAY TUMOR CA 125: CPT

## 2019-01-05 PROCEDURE — 74177 CT ABD & PELVIS W/CONTRAST: CPT | Performed by: SURGERY

## 2019-01-05 PROCEDURE — 82378 CARCINOEMBRYONIC ANTIGEN: CPT

## 2019-01-05 PROCEDURE — 36415 COLL VENOUS BLD VENIPUNCTURE: CPT

## 2019-01-05 PROCEDURE — 86301 IMMUNOASSAY TUMOR CA 19-9: CPT

## 2019-01-07 ENCOUNTER — TELEPHONE (OUTPATIENT)
Dept: SCHEDULING | Age: 54
End: 2019-01-07

## 2019-01-16 ENCOUNTER — OFFICE VISIT (OUTPATIENT)
Dept: SURGERY | Facility: CLINIC | Age: 54
End: 2019-01-16
Payer: MEDICARE

## 2019-01-16 VITALS
OXYGEN SATURATION: 98 % | RESPIRATION RATE: 18 BRPM | SYSTOLIC BLOOD PRESSURE: 123 MMHG | HEART RATE: 85 BPM | WEIGHT: 255.19 LBS | TEMPERATURE: 98 F | DIASTOLIC BLOOD PRESSURE: 82 MMHG | HEIGHT: 73 IN | BODY MASS INDEX: 33.82 KG/M2

## 2019-01-16 DIAGNOSIS — C78.6 PSEUDOMYXOMA PERITONEI (HCC): Primary | ICD-10-CM

## 2019-01-16 PROCEDURE — 99213 OFFICE O/P EST LOW 20 MIN: CPT | Performed by: SURGERY

## 2019-01-16 NOTE — PROGRESS NOTES
[facility]    Patient Name:  Sotero Damico   YOB: 1965   Gender:  Male   Appt Date:  1/16/2019   Provider:  Cici Cheung MD   Insurance:  [address] [place] [address]  Primary Care [facility] 11/19/2015: MRI--->Stable serosal T2 hyperintense lesion on the medial aspect of right lobe of the liver measuring 20 x 20 mm. This lesion demonstrates restricted diffusion and rim enhancement.  This also stable lesion on the right posterior liver near the 7/10/18: CT abdomen/pelvis: Minimal increase in size of bilobed cystic structure along the greater curvature of the stomach likely  related to pseudomyxoma peritonei. Small lesions along the right lobe of the liver are stable.   7/10/18: CEA: 3.8, CA 19-9: •  OxyCODONE HCl ER (OXYCONTIN) 40 MG Oral Tablet Extended Release 12 hour Abuse-Deterrent, Take 1 tablet by mouth Q12H., Disp: 30 tablet, Rfl: 0     Allergies Reviewed:    Franklyn [Gadopent*    ANGIOEDEMA    Comment:MRI CONTRAST ALLERGY             1/2/ Procedure: LUMBAR EPIDURAL;  Surgeon: Efrain Hemphill MD;  Location: [facility] PAIN [facility]NA[facility]   • Fluor gid & loclzj ndl/cath spi dx/ther njx  4/15/2014    Procedure: LUMBAR EPIDURAL;  Surgeon: Efrain Hemphill MD;  Location: [address] [place] [address] Exp lap, extensive cytoreduction of abdomina tumor with peritoneal stripping, partial greater omentectomy,  HIPEC with mitomycin C    • Patient documented not to have experienced any of the following events  11/20/2012    Procedure: Marianne Kramer Procedure: LUMBAR EPIDURAL;  Surgeon: Emiliana Dominguez MD;  Location: [facility]   • Patient withough preoperative order for iv antibiotic surgical site infection prophylaxis.   4/15/2014    Procedure: LUMBAR EPIDURAL;  Surgeon: Lea Camacho, MUSCULOSKELETAL: LUQ/Left lower rib pain worsening, no joint complaints, no back pain  NEURO: denies tingling, numbness, weakness  ENDOCRINE: denies weight loss/gain  PSYCH: denies mood changes       Physical Examination:  Constitutional: General Appearanc Chief of Surgical Oncolgy  Department of [address] [facility], [address] [facility]  [address], [address] [place] [address]  T: [phone]  F: [phone]

## 2019-01-22 ENCOUNTER — TELEPHONE (OUTPATIENT)
Dept: SCHEDULING | Age: 54
End: 2019-01-22

## 2019-01-22 RX ORDER — OXYCODONE HCL 40 MG/1
40 TABLET, FILM COATED, EXTENDED RELEASE ORAL EVERY 12 HOURS
Qty: 60 TABLET | Refills: 0 | Status: SHIPPED | OUTPATIENT
Start: 2019-01-22 | End: 2019-03-01 | Stop reason: SDUPTHER

## 2019-02-27 ENCOUNTER — TELEPHONE (OUTPATIENT)
Dept: SCHEDULING | Age: 54
End: 2019-02-27

## 2019-03-01 RX ORDER — OXYCODONE HCL 40 MG/1
40 TABLET, FILM COATED, EXTENDED RELEASE ORAL EVERY 12 HOURS
Qty: 60 TABLET | Refills: 0 | Status: SHIPPED | OUTPATIENT
Start: 2019-03-01 | End: 2019-03-28 | Stop reason: SDUPTHER

## 2019-03-26 ENCOUNTER — TELEPHONE (OUTPATIENT)
Dept: SCHEDULING | Age: 54
End: 2019-03-26

## 2019-03-28 ENCOUNTER — TELEPHONE (OUTPATIENT)
Dept: SCHEDULING | Age: 54
End: 2019-03-28

## 2019-03-28 RX ORDER — OXYCODONE HCL 40 MG/1
40 TABLET, FILM COATED, EXTENDED RELEASE ORAL EVERY 12 HOURS
Qty: 60 TABLET | Refills: 0 | Status: SHIPPED | OUTPATIENT
Start: 2019-03-28 | End: 2019-03-29 | Stop reason: SDUPTHER

## 2019-03-29 ENCOUNTER — TELEPHONE (OUTPATIENT)
Dept: SCHEDULING | Age: 54
End: 2019-03-29

## 2019-03-29 RX ORDER — OXYCODONE HCL 40 MG/1
40 TABLET, FILM COATED, EXTENDED RELEASE ORAL EVERY 12 HOURS SCHEDULED
Qty: 60 TABLET | Refills: 0 | Status: SHIPPED | OUTPATIENT
Start: 2019-03-29 | End: 2019-04-24 | Stop reason: SDUPTHER

## 2019-04-22 ENCOUNTER — TELEPHONE (OUTPATIENT)
Dept: SCHEDULING | Age: 54
End: 2019-04-22

## 2019-04-24 ENCOUNTER — TELEPHONE (OUTPATIENT)
Dept: SCHEDULING | Age: 54
End: 2019-04-24

## 2019-04-24 PROBLEM — C78.6: Status: ACTIVE | Noted: 2018-01-05

## 2019-04-24 RX ORDER — OXYCODONE HCL 40 MG/1
40 TABLET, FILM COATED, EXTENDED RELEASE ORAL EVERY 12 HOURS SCHEDULED
Qty: 60 TABLET | Refills: 0 | Status: SHIPPED | OUTPATIENT
Start: 2019-04-24 | End: 2019-05-28 | Stop reason: SDUPTHER

## 2019-04-24 RX ORDER — FEBUXOSTAT 80 MG/1
TABLET, FILM COATED ORAL DAILY
COMMUNITY
End: 2023-06-05 | Stop reason: DRUGHIGH

## 2019-05-16 ENCOUNTER — TELEPHONE (OUTPATIENT)
Dept: SCHEDULING | Age: 54
End: 2019-05-16

## 2019-05-28 ENCOUNTER — TELEPHONE (OUTPATIENT)
Dept: SCHEDULING | Age: 54
End: 2019-05-28

## 2019-05-29 RX ORDER — OXYCODONE HCL 40 MG/1
40 TABLET, FILM COATED, EXTENDED RELEASE ORAL EVERY 12 HOURS SCHEDULED
Qty: 60 TABLET | Refills: 0 | Status: SHIPPED | OUTPATIENT
Start: 2019-05-29 | End: 2019-06-25 | Stop reason: SDUPTHER

## 2019-06-25 ENCOUNTER — OFFICE VISIT (OUTPATIENT)
Dept: INTERNAL MEDICINE | Age: 54
End: 2019-06-25

## 2019-06-25 VITALS
DIASTOLIC BLOOD PRESSURE: 62 MMHG | OXYGEN SATURATION: 92 % | SYSTOLIC BLOOD PRESSURE: 118 MMHG | WEIGHT: 244.49 LBS | BODY MASS INDEX: 32.26 KG/M2 | HEART RATE: 73 BPM

## 2019-06-25 DIAGNOSIS — N20.0 CALCIUM NEPHROLITHIASIS: ICD-10-CM

## 2019-06-25 DIAGNOSIS — M1A.9XX0 CHRONIC GOUT WITHOUT TOPHUS, UNSPECIFIED CAUSE, UNSPECIFIED SITE: ICD-10-CM

## 2019-06-25 DIAGNOSIS — C78.6 PERITONEAL CARCINOMATOSIS (CMD): Primary | ICD-10-CM

## 2019-06-25 PROCEDURE — 99214 OFFICE O/P EST MOD 30 MIN: CPT | Performed by: INTERNAL MEDICINE

## 2019-06-25 RX ORDER — OXYCODONE HCL 40 MG/1
40 TABLET, FILM COATED, EXTENDED RELEASE ORAL EVERY 12 HOURS SCHEDULED
Qty: 60 TABLET | Refills: 0 | Status: SHIPPED | OUTPATIENT
Start: 2019-06-27 | End: 2019-07-23 | Stop reason: SDUPTHER

## 2019-06-25 SDOH — HEALTH STABILITY: MENTAL HEALTH: HOW OFTEN DO YOU HAVE A DRINK CONTAINING ALCOHOL?: NEVER

## 2019-07-13 ENCOUNTER — LAB ENCOUNTER (OUTPATIENT)
Dept: LAB | Facility: HOSPITAL | Age: 54
End: 2019-07-13
Attending: SURGERY
Payer: MEDICARE

## 2019-07-13 ENCOUNTER — HOSPITAL ENCOUNTER (OUTPATIENT)
Dept: CT IMAGING | Facility: HOSPITAL | Age: 54
Discharge: HOME | End: 2019-07-13
Attending: SURGERY
Payer: MEDICARE

## 2019-07-13 DIAGNOSIS — C78.6 PSEUDOMYXOMA PERITONEI (HCC): ICD-10-CM

## 2019-07-13 DIAGNOSIS — M1A.09X0 CHRONIC GOUT OF MULTIPLE SITES, UNSPECIFIED CAUSE: ICD-10-CM

## 2019-07-13 DIAGNOSIS — Z79.899 LONG-TERM USE OF HIGH-RISK MEDICATION: ICD-10-CM

## 2019-07-13 LAB
ALBUMIN SERPL-MCNC: 3.7 G/DL (ref 3.4–5)
ALBUMIN/GLOB SERPL: 1 {RATIO} (ref 1–2)
ALP LIVER SERPL-CCNC: 82 U/L (ref 45–117)
ALT SERPL-CCNC: 21 U/L (ref 16–61)
ANION GAP SERPL CALC-SCNC: 6 MMOL/L (ref 0–18)
AST SERPL-CCNC: 16 U/L (ref 15–37)
BASOPHILS # BLD AUTO: 0.06 X10(3) UL (ref 0–0.2)
BASOPHILS NFR BLD AUTO: 0.6 %
BILIRUB SERPL-MCNC: 0.6 MG/DL (ref 0.1–2)
BUN BLD-MCNC: 15 MG/DL (ref 7–18)
BUN/CREAT SERPL: 11.5 (ref 10–20)
CALCIUM BLD-MCNC: 9.4 MG/DL (ref 8.5–10.1)
CANCER AG125 SERPL-ACNC: 3 U/ML (ref ?–35)
CANCER AG19-9 SERPL-ACNC: 19.1 U/ML (ref ?–37)
CEA SERPL-MCNC: 4.1 NG/ML (ref ?–5)
CHLORIDE SERPL-SCNC: 106 MMOL/L (ref 98–112)
CO2 SERPL-SCNC: 27 MMOL/L (ref 21–32)
CREAT BLD-MCNC: 1.3 MG/DL (ref 0.7–1.3)
DEPRECATED RDW RBC AUTO: 44.3 FL (ref 35.1–46.3)
EOSINOPHIL # BLD AUTO: 0.36 X10(3) UL (ref 0–0.7)
EOSINOPHIL NFR BLD AUTO: 3.8 %
ERYTHROCYTE [DISTWIDTH] IN BLOOD BY AUTOMATED COUNT: 12.4 % (ref 11–15)
GLOBULIN PLAS-MCNC: 3.8 G/DL (ref 2.8–4.4)
GLUCOSE BLD-MCNC: 114 MG/DL (ref 70–99)
HCT VFR BLD AUTO: 48.3 % (ref 39–53)
HGB BLD-MCNC: 16 G/DL (ref 13–17.5)
IMM GRANULOCYTES # BLD AUTO: 0.02 X10(3) UL (ref 0–1)
IMM GRANULOCYTES NFR BLD: 0.2 %
LYMPHOCYTES # BLD AUTO: 2.15 X10(3) UL (ref 1–4)
LYMPHOCYTES NFR BLD AUTO: 22.4 %
M PROTEIN MFR SERPL ELPH: 7.5 G/DL (ref 6.4–8.2)
MCH RBC QN AUTO: 32.1 PG (ref 26–34)
MCHC RBC AUTO-ENTMCNC: 33.1 G/DL (ref 31–37)
MCV RBC AUTO: 96.8 FL (ref 80–100)
MONOCYTES # BLD AUTO: 0.85 X10(3) UL (ref 0.1–1)
MONOCYTES NFR BLD AUTO: 8.9 %
NEUTROPHILS # BLD AUTO: 6.16 X10 (3) UL (ref 1.5–7.7)
NEUTROPHILS # BLD AUTO: 6.16 X10(3) UL (ref 1.5–7.7)
NEUTROPHILS NFR BLD AUTO: 64.1 %
OSMOLALITY SERPL CALC.SUM OF ELEC: 290 MOSM/KG (ref 275–295)
PLATELET # BLD AUTO: 233 10(3)UL (ref 150–450)
POTASSIUM SERPL-SCNC: 3.9 MMOL/L (ref 3.5–5.1)
RBC # BLD AUTO: 4.99 X10(6)UL (ref 4.3–5.7)
SODIUM SERPL-SCNC: 139 MMOL/L (ref 136–145)
URATE SERPL-MCNC: 4.9 MG/DL (ref 3.5–7.2)
WBC # BLD AUTO: 9.6 X10(3) UL (ref 4–11)

## 2019-07-13 PROCEDURE — 82378 CARCINOEMBRYONIC ANTIGEN: CPT

## 2019-07-13 PROCEDURE — 84550 ASSAY OF BLOOD/URIC ACID: CPT

## 2019-07-13 PROCEDURE — 80053 COMPREHEN METABOLIC PANEL: CPT

## 2019-07-13 PROCEDURE — 85025 COMPLETE CBC W/AUTO DIFF WBC: CPT

## 2019-07-13 PROCEDURE — 86304 IMMUNOASSAY TUMOR CA 125: CPT

## 2019-07-13 PROCEDURE — 86301 IMMUNOASSAY TUMOR CA 19-9: CPT

## 2019-07-13 PROCEDURE — 74177 CT ABD & PELVIS W/CONTRAST: CPT | Performed by: SURGERY

## 2019-07-13 PROCEDURE — 36415 COLL VENOUS BLD VENIPUNCTURE: CPT

## 2019-07-15 ENCOUNTER — TELEPHONE (OUTPATIENT)
Dept: SURGERY | Facility: CLINIC | Age: 54
End: 2019-07-15

## 2019-07-15 NOTE — PROGRESS NOTES
Call pt  Lung, kidney and uric acid is stable   Call pt --laboratory testing is within acceptable monitoring ranges, continue current treatment

## 2019-07-23 RX ORDER — OXYCODONE HCL 40 MG/1
40 TABLET, FILM COATED, EXTENDED RELEASE ORAL EVERY 12 HOURS SCHEDULED
Qty: 60 TABLET | Refills: 0 | Status: SHIPPED | OUTPATIENT
Start: 2019-07-23 | End: 2019-08-27 | Stop reason: SDUPTHER

## 2019-07-31 ENCOUNTER — OFFICE VISIT (OUTPATIENT)
Dept: SURGERY | Facility: CLINIC | Age: 54
End: 2019-07-31
Payer: MEDICARE

## 2019-07-31 VITALS
TEMPERATURE: 97 F | RESPIRATION RATE: 18 BRPM | HEART RATE: 68 BPM | SYSTOLIC BLOOD PRESSURE: 126 MMHG | BODY MASS INDEX: 32 KG/M2 | OXYGEN SATURATION: 95 % | DIASTOLIC BLOOD PRESSURE: 78 MMHG | WEIGHT: 243 LBS

## 2019-07-31 DIAGNOSIS — C78.6 PSEUDOMYXOMA PERITONEI (HCC): Primary | ICD-10-CM

## 2019-07-31 DIAGNOSIS — C18.1 ADENOCARCINOMA OF APPENDIX (HCC): ICD-10-CM

## 2019-07-31 PROCEDURE — 99213 OFFICE O/P EST LOW 20 MIN: CPT | Performed by: SURGERY

## 2019-07-31 NOTE — PROGRESS NOTES
[facility] Surgical Oncology    Patient Name:  Sravani Block   YOB: 1965   Gender:  Male   Appt Date:  7/31/2019   Provider:  Gibson Williamson MD   Insurance:  [address] [place] [address]    Primary Care Provider:Jose Barth 11/19/2015: MRI--->Stable serosal T2 hyperintense lesion on the medial aspect of right lobe of the liver measuring 20 x 20 mm. This lesion demonstrates restricted diffusion and rim enhancement.  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 Small lesions along the right lobe of the liver are stable.   7/10/18: CEA: 3.8, CA 19-9: •  methylPREDNISolone 4 MG Oral Tablet Therapy Pack, Use as directed for 6 days, Disp: 21 tablet, Rfl: 1     Allergies Reviewed:    Radhaist [Gadopent*    ANGIOEDEMA    Comment:MRI CONTRAST ALLERGY             1/2/18: pt states he got significant HA w/con Procedure: LUMBAR EPIDURAL;  Surgeon: Mundo Howell MD;  Location: [facility]   • Fluor gid & loclzj ndl/cath spi dx/ther njx  4/15/2014    Procedure: LUMBAR EPIDURAL;  Surgeon: Mundo Howell MD;  Location: [address] [place] [address] Exp lap, extensive cytoreduction of abdomina tumor with peritoneal stripping, partial greater omentectomy,  HIPEC with mitomycin C    • Patient documented not to have experienced any of the following events  11/20/2012    Procedure: Julissa Soto Procedure: LUMBAR EPIDURAL;  Surgeon: Darrell Khan MD;  Location[facility]   • Patient withough preoperative order for iv antibiotic surgical site infection prophylaxis.   4/15/2014    Procedure: LUMBAR EPIDURAL;  Surgeon: Reggie Miguel, MUSCULOSKELETAL: LUQ/Left lower rib pain same, no joint complaints, no back pain  NEURO: denies tingling, numbness, weakness  ENDOCRINE: no weight loss  PSYCH: denies mood changes       Physical Examination:  Constitutional: General Appearance: healthy-[place]

## 2019-08-27 RX ORDER — OXYCODONE HCL 40 MG/1
40 TABLET, FILM COATED, EXTENDED RELEASE ORAL EVERY 12 HOURS SCHEDULED
Qty: 60 TABLET | Refills: 0 | Status: SHIPPED | OUTPATIENT
Start: 2019-08-27 | End: 2019-09-24 | Stop reason: SDUPTHER

## 2019-09-24 DIAGNOSIS — C18.1 ADENOCARCINOMA, APPENDIX (CMD): Primary | ICD-10-CM

## 2019-09-25 RX ORDER — OXYCODONE HCL 40 MG/1
40 TABLET, FILM COATED, EXTENDED RELEASE ORAL EVERY 12 HOURS SCHEDULED
Qty: 60 TABLET | Refills: 0 | Status: SHIPPED | OUTPATIENT
Start: 2019-09-25 | End: 2019-10-25 | Stop reason: SDUPTHER

## 2019-09-25 RX ORDER — NALOXONE HYDROCHLORIDE 4 MG/.1ML
SPRAY NASAL
Qty: 2 EACH | Refills: 1 | Status: SHIPPED | OUTPATIENT
Start: 2019-09-25 | End: 2023-06-05 | Stop reason: ALTCHOICE

## 2019-10-09 ENCOUNTER — HOSPITAL ENCOUNTER (OUTPATIENT)
Dept: CT IMAGING | Facility: HOSPITAL | Age: 54
Discharge: HOME | End: 2019-10-09
Attending: SURGERY
Payer: MEDICARE

## 2019-10-09 ENCOUNTER — LAB ENCOUNTER (OUTPATIENT)
Dept: LAB | Facility: HOSPITAL | Age: 54
End: 2019-10-09
Attending: SURGERY
Payer: MEDICARE

## 2019-10-09 DIAGNOSIS — C78.6 PSEUDOMYXOMA PERITONEI (HCC): ICD-10-CM

## 2019-10-09 DIAGNOSIS — C18.1 ADENOCARCINOMA OF APPENDIX (HCC): ICD-10-CM

## 2019-10-09 PROCEDURE — 86301 IMMUNOASSAY TUMOR CA 19-9: CPT

## 2019-10-09 PROCEDURE — 82378 CARCINOEMBRYONIC ANTIGEN: CPT

## 2019-10-09 PROCEDURE — 82565 ASSAY OF CREATININE: CPT

## 2019-10-09 PROCEDURE — 36415 COLL VENOUS BLD VENIPUNCTURE: CPT

## 2019-10-09 PROCEDURE — 86304 IMMUNOASSAY TUMOR CA 125: CPT

## 2019-10-09 PROCEDURE — 74177 CT ABD & PELVIS W/CONTRAST: CPT | Performed by: SURGERY

## 2019-10-23 ENCOUNTER — OFFICE VISIT (OUTPATIENT)
Dept: SURGERY | Facility: CLINIC | Age: 54
End: 2019-10-23
Payer: MEDICARE

## 2019-10-23 ENCOUNTER — TELEPHONE (OUTPATIENT)
Dept: SURGERY | Facility: CLINIC | Age: 54
End: 2019-10-23

## 2019-10-23 ENCOUNTER — OFFICE VISIT (OUTPATIENT)
Dept: HEMATOLOGY/ONCOLOGY | Facility: HOSPITAL | Age: 54
End: 2019-10-23
Attending: SURGERY
Payer: MEDICARE

## 2019-10-23 ENCOUNTER — RESEARCH ENCOUNTER (OUTPATIENT)
Dept: HEMATOLOGY/ONCOLOGY | Facility: HOSPITAL | Age: 54
End: 2019-10-23

## 2019-10-23 VITALS
HEART RATE: 110 BPM | SYSTOLIC BLOOD PRESSURE: 143 MMHG | WEIGHT: 244.63 LBS | BODY MASS INDEX: 32 KG/M2 | DIASTOLIC BLOOD PRESSURE: 85 MMHG

## 2019-10-23 DIAGNOSIS — N20.0 CALCULUS OF KIDNEY: ICD-10-CM

## 2019-10-23 DIAGNOSIS — C78.6 PSEUDOMYXOMA PERITONEI (HCC): Primary | ICD-10-CM

## 2019-10-23 PROCEDURE — 99214 OFFICE O/P EST MOD 30 MIN: CPT | Performed by: SURGERY

## 2019-10-23 NOTE — PATIENT INSTRUCTIONS
Surgery: Redo Cytoreductive surgery, possible multi-organ resection, hyperthermic intraperitoneal chemotherapy (HIPEC)    Date of Surgery: 11/22/19     [facility] [place], [address]  Phone: [phone]      · procedure  · Inform your primary care physician of your surgery and ask if him/her will need to see you prior to surgery. · If this is an inpatient surgery, attending the [address] Surgical Oncology Pre-operative Education Class is strongly encouraged.  Email S

## 2019-10-23 NOTE — PROGRESS NOTES
STUDY: HIPEC database REGISTRY  ID # [id number]    Met with patient and his sister following consult with Sherry Sánchez. Patient has had 2 previous cytoreductive surgeries and HIPEC procedures for Pseudomyxoma peritonei.  He now has recurrence of disease and  disc

## 2019-10-23 NOTE — PROGRESS NOTES
ERAS NUTRITION NOTE:      DX: pseudomyxoma peritonei     Surgery: Redo Cytoreductive surgery, possible multi-organ resection, hyperthermic intraperitoneal chemotherapy (HIPEC)     Date of Surgery: 11/22/19      OTHER: verbal and written ix provided re: ERA

## 2019-10-23 NOTE — H&P (VIEW-ONLY)
[facility]    Patient Name:  Agapito Leung   YOB: 1965   Gender:  Male   Appt Date:  10/23/2019   Provider:  Miguel Portillo MD   Insurance:  [address] [place] [address]  Primary Care [facility] 11/19/2015: MRI--->Stable serosal T2 hyperintense lesion on the medial aspect of right lobe of the liver measuring 20 x 20 mm. This lesion demonstrates restricted diffusion and rim enhancement.  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  Small lesions along the right lobe of the liver are stable.   7/10/18: CEA: 3.8, CA 19-9: Allergies Reviewed:    Magnevist [Gadopent*    ANGIOEDEMA    Comment:MRI CONTRAST ALLERGY             1/2/18: pt states he got significant HA w/contrast             injection, requiring hydration and hospitalization             in past. NO problem w/CT con • Fluor gid & loclzj ndl/cath spi dx/ther njx  4/15/2014    Procedure: LUMBAR EPIDURAL;  Surgeon: Emiliana Dominguez MD;  Location: [facility] PAIN MANAG[facility]   • Fluor gid & loclzj ndl/cath spi dx/ther njx  4/29/2014    Procedure: LUMBAR EPIDURAL;  Surge • Patient documented not to have experienced any of the following events  11/20/2012    Procedure: CYSTOSCOPY WITH REMOVAL OF STENT;  Surgeon: Thelma Land MD;  Location: [address] [place] [address]   • Patient documented not to have experienced any of the • Patient withough preoperative order for iv antibiotic surgical site infection prophylaxis.   4/15/2014    Procedure: LUMBAR EPIDURAL;  Surgeon: Munira Lott MD;  Location: [facility] PAIN [facility]NA[facility]   • Patient withough preoperative order for iv an He reports no rapid or irregular heartbeat. He reports no chest pain. He reports no nausea. He reports no vomiting. He reports no diarrhea. He reports no constipation. He reports no bright red blood per rectum. He reports no fatigue.  He reports no blood in Findings were discussed with the patient again. His wife was present. Consideration for cytoreductive surgery and hyperthermic intraperitoneal chemotherapy were made. Patient agreed.   The surgical treatment matter including indications, rationale, and r

## 2019-10-23 NOTE — H&P
[address] [facility]    Patient Name:  Sravani Block   YOB: 1965   Gender:  Male   Appt Date:  10/23/2019   Provider:  Gibson Williamson MD   Insurance:  [address] [place] [address]  Primary Care [facility] 11/19/2015: MRI--->Stable serosal T2 hyperintense lesion on the medial aspect of right lobe of the liver measuring 20 x 20 mm. This lesion demonstrates restricted diffusion and rim enhancement.  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  Small lesions along the right lobe of the liver are stable.   7/10/18: CEA: 3.8, CA 19-9: Allergies Reviewed:    Magnevist [Gadopent*    ANGIOEDEMA    Comment:MRI CONTRAST ALLERGY             1/2/18: pt states he got significant HA w/contrast             injection, requiring hydration and hospitalization             in past. NO problem w/CT con • Fluor gid & loclzj ndl/cath spi dx/ther njx  4/15/2014    Procedure: LUMBAR EPIDURAL;  Surgeon: Gia Simon MD;  Location: [facility] FOR PAIN MANAGEMENT   • Fluor gid & loclzj ndl/cath spi dx/ther njx  4/29/2014    Procedure: LUMBAR EPIDURAL;  Surge • Patient documented not to have experienced any of the following events  11/20/2012    Procedure: CYSTOSCOPY WITH REMOVAL OF STENT;  Surgeon: Chhaya Shultz MD;  Location: [address] [place] [address]   • Patient documented not to have experienced any of the • Patient withough preoperative order for iv antibiotic surgical site infection prophylaxis.   4/15/2014    Procedure: LUMBAR EPIDURAL;  Surgeon: Sushil Boogie MD;  Location: [facility] FOR PAIN MA[facility]   • Patient withough preoperative order for iv an He reports no rapid or irregular heartbeat. He reports no chest pain. He reports no nausea. He reports no vomiting. He reports no diarrhea. He reports no constipation. He reports no bright red blood per rectum. He reports no fatigue.  He reports no blood in Findings were discussed with the patient again. His wife was present. Consideration for cytoreductive surgery and hyperthermic intraperitoneal chemotherapy were made. Patient agreed.   The surgical treatment matter including indications, rationale, and r

## 2019-10-23 NOTE — TELEPHONE ENCOUNTER
Call to pt regarding date for surgery with Dr. Mayo Schofield. Informed pt that unfortunately Dr. Mayo Schofield cannot do his surgery on 11/22/19 and it will need to be changed to 11/19/19. Pt verbalized understanding and in agreement with date.

## 2019-10-24 DIAGNOSIS — C78.6 PSEUDOMYXOMA PERITONEI (HCC): Primary | ICD-10-CM

## 2019-10-25 RX ORDER — NALOXONE HYDROCHLORIDE 4 MG/.1ML
SPRAY NASAL
Qty: 2 EACH | Refills: 1 | Status: SHIPPED | OUTPATIENT
Start: 2019-10-25 | End: 2023-06-05 | Stop reason: ALTCHOICE

## 2019-10-25 RX ORDER — OXYCODONE HCL 40 MG/1
40 TABLET, FILM COATED, EXTENDED RELEASE ORAL EVERY 12 HOURS SCHEDULED
Qty: 60 TABLET | Refills: 0 | Status: SHIPPED | OUTPATIENT
Start: 2019-10-25 | End: 2019-12-11 | Stop reason: SDUPTHER

## 2019-10-28 ENCOUNTER — TELEPHONE (OUTPATIENT)
Dept: SCHEDULING | Age: 54
End: 2019-10-28

## 2019-11-07 ENCOUNTER — OFFICE VISIT (OUTPATIENT)
Dept: INTERNAL MEDICINE | Age: 54
End: 2019-11-07

## 2019-11-07 VITALS
BODY MASS INDEX: 32.87 KG/M2 | HEIGHT: 73 IN | DIASTOLIC BLOOD PRESSURE: 86 MMHG | SYSTOLIC BLOOD PRESSURE: 132 MMHG | WEIGHT: 248 LBS

## 2019-11-07 DIAGNOSIS — C78.6 PERITONEAL CARCINOMATOSIS (CMD): Primary | ICD-10-CM

## 2019-11-07 DIAGNOSIS — Z01.818 PREOPERATIVE CLEARANCE: ICD-10-CM

## 2019-11-07 PROCEDURE — 99214 OFFICE O/P EST MOD 30 MIN: CPT | Performed by: INTERNAL MEDICINE

## 2019-11-12 ENCOUNTER — TELEPHONE (OUTPATIENT)
Dept: INTERNAL MEDICINE | Age: 54
End: 2019-11-12

## 2019-11-13 ENCOUNTER — LABORATORY ENCOUNTER (OUTPATIENT)
Dept: LAB | Facility: HOSPITAL | Age: 54
End: 2019-11-13
Attending: SURGERY
Payer: MEDICARE

## 2019-11-13 DIAGNOSIS — C78.6 PSEUDOMYXOMA PERITONEI (HCC): ICD-10-CM

## 2019-11-13 PROCEDURE — 86850 RBC ANTIBODY SCREEN: CPT

## 2019-11-13 PROCEDURE — 80048 BASIC METABOLIC PNL TOTAL CA: CPT

## 2019-11-13 PROCEDURE — 86901 BLOOD TYPING SEROLOGIC RH(D): CPT

## 2019-11-13 PROCEDURE — 86900 BLOOD TYPING SEROLOGIC ABO: CPT

## 2019-11-13 PROCEDURE — 36415 COLL VENOUS BLD VENIPUNCTURE: CPT

## 2019-11-13 PROCEDURE — 85025 COMPLETE CBC W/AUTO DIFF WBC: CPT

## 2019-11-18 ENCOUNTER — ANESTHESIA EVENT (OUTPATIENT)
Dept: SURGERY | Facility: HOSPITAL | Age: 54
DRG: 327 | End: 2019-11-18
Payer: MEDICARE

## 2019-11-19 ENCOUNTER — HOSPITAL ENCOUNTER (INPATIENT)
Facility: HOSPITAL | Age: 54
LOS: 6 days | Discharge: HOME HEALTH | DRG: 327 | End: 2019-11-25
Attending: SURGERY | Admitting: SURGERY
Payer: MEDICARE

## 2019-11-19 ENCOUNTER — ANESTHESIA (OUTPATIENT)
Dept: SURGERY | Facility: HOSPITAL | Age: 54
DRG: 327 | End: 2019-11-19
Payer: MEDICARE

## 2019-11-19 DIAGNOSIS — C78.6 PSEUDOMYXOMA PERITONEI (HCC): Primary | ICD-10-CM

## 2019-11-19 PROCEDURE — DWY68ZZ HYPERTHERMIA OF PELVIC REGION: ICD-10-PCS | Performed by: SURGERY

## 2019-11-19 PROCEDURE — 0BBT0ZZ EXCISION OF DIAPHRAGM, OPEN APPROACH: ICD-10-PCS | Performed by: SURGERY

## 2019-11-19 PROCEDURE — P9045 ALBUMIN (HUMAN), 5%, 250 ML: HCPCS | Performed by: ANESTHESIOLOGY

## 2019-11-19 PROCEDURE — 99223 1ST HOSP IP/OBS HIGH 75: CPT | Performed by: INTERNAL MEDICINE

## 2019-11-19 PROCEDURE — 3E0M30Y INTRODUCTION OF HYPERTHERMIC ANTINEOPLASTIC INTO PERITONEAL CAVITY, PERCUTANEOUS APPROACH: ICD-10-PCS | Performed by: SURGERY

## 2019-11-19 PROCEDURE — 0DN80ZZ RELEASE SMALL INTESTINE, OPEN APPROACH: ICD-10-PCS | Performed by: SURGERY

## 2019-11-19 PROCEDURE — 0DB80ZZ EXCISION OF SMALL INTESTINE, OPEN APPROACH: ICD-10-PCS | Performed by: SURGERY

## 2019-11-19 PROCEDURE — 0DB60ZZ EXCISION OF STOMACH, OPEN APPROACH: ICD-10-PCS | Performed by: SURGERY

## 2019-11-19 PROCEDURE — 0FB20ZZ EXCISION OF LEFT LOBE LIVER, OPEN APPROACH: ICD-10-PCS | Performed by: SURGERY

## 2019-11-19 RX ORDER — MIDAZOLAM HYDROCHLORIDE 1 MG/ML
1 INJECTION INTRAMUSCULAR; INTRAVENOUS EVERY 5 MIN PRN
Status: DISCONTINUED | OUTPATIENT
Start: 2019-11-19 | End: 2019-11-19 | Stop reason: HOSPADM

## 2019-11-19 RX ORDER — KETOROLAC TROMETHAMINE 30 MG/ML
INJECTION, SOLUTION INTRAMUSCULAR; INTRAVENOUS AS NEEDED
Status: DISCONTINUED | OUTPATIENT
Start: 2019-11-19 | End: 2019-11-19 | Stop reason: SURG

## 2019-11-19 RX ORDER — SODIUM CHLORIDE, SODIUM LACTATE, POTASSIUM CHLORIDE, CALCIUM CHLORIDE 600; 310; 30; 20 MG/100ML; MG/100ML; MG/100ML; MG/100ML
INJECTION, SOLUTION INTRAVENOUS CONTINUOUS PRN
Status: DISCONTINUED | OUTPATIENT
Start: 2019-11-19 | End: 2019-11-19 | Stop reason: SURG

## 2019-11-19 RX ORDER — ENOXAPARIN SODIUM 100 MG/ML
40 INJECTION SUBCUTANEOUS DAILY
Status: DISCONTINUED | OUTPATIENT
Start: 2019-11-20 | End: 2019-11-23

## 2019-11-19 RX ORDER — METRONIDAZOLE 500 MG/100ML
500 INJECTION, SOLUTION INTRAVENOUS ONCE
Status: COMPLETED | OUTPATIENT
Start: 2019-11-19 | End: 2019-11-19

## 2019-11-19 RX ORDER — ACETAMINOPHEN 500 MG
1000 TABLET ORAL EVERY 6 HOURS PRN
COMMUNITY

## 2019-11-19 RX ORDER — DEXAMETHASONE SODIUM PHOSPHATE 10 MG/ML
INJECTION, SOLUTION INTRAMUSCULAR; INTRAVENOUS AS NEEDED
Status: DISCONTINUED | OUTPATIENT
Start: 2019-11-19 | End: 2019-11-19 | Stop reason: SURG

## 2019-11-19 RX ORDER — ROCURONIUM BROMIDE 10 MG/ML
INJECTION, SOLUTION INTRAVENOUS AS NEEDED
Status: DISCONTINUED | OUTPATIENT
Start: 2019-11-19 | End: 2019-11-19 | Stop reason: SURG

## 2019-11-19 RX ORDER — SODIUM CHLORIDE 9 MG/ML
INJECTION, SOLUTION INTRAVENOUS CONTINUOUS
Status: DISCONTINUED | OUTPATIENT
Start: 2019-11-19 | End: 2019-11-20

## 2019-11-19 RX ORDER — SODIUM CHLORIDE 9 MG/ML
INJECTION, SOLUTION INTRAVENOUS CONTINUOUS PRN
Status: DISCONTINUED | OUTPATIENT
Start: 2019-11-19 | End: 2019-11-19 | Stop reason: SURG

## 2019-11-19 RX ORDER — FAMOTIDINE 10 MG/ML
20 INJECTION, SOLUTION INTRAVENOUS 2 TIMES DAILY
Status: DISCONTINUED | OUTPATIENT
Start: 2019-11-19 | End: 2019-11-23

## 2019-11-19 RX ORDER — MIDAZOLAM HYDROCHLORIDE 1 MG/ML
INJECTION INTRAMUSCULAR; INTRAVENOUS AS NEEDED
Status: DISCONTINUED | OUTPATIENT
Start: 2019-11-19 | End: 2019-11-19 | Stop reason: SURG

## 2019-11-19 RX ORDER — HEPARIN SODIUM 5000 [USP'U]/ML
5000 INJECTION, SOLUTION INTRAVENOUS; SUBCUTANEOUS ONCE
Status: COMPLETED | OUTPATIENT
Start: 2019-11-19 | End: 2019-11-19

## 2019-11-19 RX ORDER — LIDOCAINE HYDROCHLORIDE ANHYDROUS AND DEXTROSE MONOHYDRATE .8; 5 G/100ML; G/100ML
INJECTION, SOLUTION INTRAVENOUS CONTINUOUS PRN
Status: DISCONTINUED | OUTPATIENT
Start: 2019-11-19 | End: 2019-11-19 | Stop reason: SURG

## 2019-11-19 RX ORDER — HYDROMORPHONE HYDROCHLORIDE 1 MG/ML
0.4 INJECTION, SOLUTION INTRAMUSCULAR; INTRAVENOUS; SUBCUTANEOUS EVERY 5 MIN PRN
Status: DISCONTINUED | OUTPATIENT
Start: 2019-11-19 | End: 2019-11-19 | Stop reason: HOSPADM

## 2019-11-19 RX ORDER — SODIUM CHLORIDE, SODIUM LACTATE, POTASSIUM CHLORIDE, CALCIUM CHLORIDE 600; 310; 30; 20 MG/100ML; MG/100ML; MG/100ML; MG/100ML
INJECTION, SOLUTION INTRAVENOUS CONTINUOUS
Status: DISCONTINUED | OUTPATIENT
Start: 2019-11-19 | End: 2019-11-19 | Stop reason: HOSPADM

## 2019-11-19 RX ORDER — MEPERIDINE HYDROCHLORIDE 25 MG/ML
12.5 INJECTION INTRAMUSCULAR; INTRAVENOUS; SUBCUTANEOUS AS NEEDED
Status: DISCONTINUED | OUTPATIENT
Start: 2019-11-19 | End: 2019-11-19 | Stop reason: HOSPADM

## 2019-11-19 RX ORDER — ACETAMINOPHEN 10 MG/ML
1000 INJECTION, SOLUTION INTRAVENOUS EVERY 6 HOURS
Status: DISCONTINUED | OUTPATIENT
Start: 2019-11-19 | End: 2019-11-21

## 2019-11-19 RX ORDER — HYDROMORPHONE HYDROCHLORIDE 1 MG/ML
INJECTION, SOLUTION INTRAMUSCULAR; INTRAVENOUS; SUBCUTANEOUS
Status: COMPLETED
Start: 2019-11-19 | End: 2019-11-19

## 2019-11-19 RX ORDER — KETAMINE HYDROCHLORIDE 50 MG/ML
INJECTION, SOLUTION, CONCENTRATE INTRAMUSCULAR; INTRAVENOUS AS NEEDED
Status: DISCONTINUED | OUTPATIENT
Start: 2019-11-19 | End: 2019-11-19 | Stop reason: SURG

## 2019-11-19 RX ORDER — SODIUM CHLORIDE, SODIUM LACTATE, POTASSIUM CHLORIDE, CALCIUM CHLORIDE 600; 310; 30; 20 MG/100ML; MG/100ML; MG/100ML; MG/100ML
INJECTION, SOLUTION INTRAVENOUS CONTINUOUS
Status: DISCONTINUED | OUTPATIENT
Start: 2019-11-19 | End: 2019-11-20

## 2019-11-19 RX ORDER — ACETAMINOPHEN 500 MG
1000 TABLET ORAL ONCE
Status: DISCONTINUED | OUTPATIENT
Start: 2019-11-19 | End: 2019-11-19 | Stop reason: HOSPADM

## 2019-11-19 RX ORDER — ACETAMINOPHEN 10 MG/ML
INJECTION, SOLUTION INTRAVENOUS AS NEEDED
Status: DISCONTINUED | OUTPATIENT
Start: 2019-11-19 | End: 2019-11-19 | Stop reason: SURG

## 2019-11-19 RX ORDER — METOCLOPRAMIDE HYDROCHLORIDE 5 MG/ML
10 INJECTION INTRAMUSCULAR; INTRAVENOUS AS NEEDED
Status: DISCONTINUED | OUTPATIENT
Start: 2019-11-19 | End: 2019-11-19 | Stop reason: HOSPADM

## 2019-11-19 RX ORDER — EPHEDRINE SULFATE 50 MG/ML
INJECTION, SOLUTION INTRAVENOUS AS NEEDED
Status: DISCONTINUED | OUTPATIENT
Start: 2019-11-19 | End: 2019-11-19 | Stop reason: SURG

## 2019-11-19 RX ORDER — DIPHENHYDRAMINE HYDROCHLORIDE 50 MG/ML
12.5 INJECTION INTRAMUSCULAR; INTRAVENOUS AS NEEDED
Status: DISCONTINUED | OUTPATIENT
Start: 2019-11-19 | End: 2019-11-19 | Stop reason: HOSPADM

## 2019-11-19 RX ORDER — ONDANSETRON 2 MG/ML
INJECTION INTRAMUSCULAR; INTRAVENOUS AS NEEDED
Status: DISCONTINUED | OUTPATIENT
Start: 2019-11-19 | End: 2019-11-19 | Stop reason: SURG

## 2019-11-19 RX ORDER — ALBUMIN, HUMAN INJ 5% 5 %
SOLUTION INTRAVENOUS CONTINUOUS PRN
Status: DISCONTINUED | OUTPATIENT
Start: 2019-11-19 | End: 2019-11-19 | Stop reason: SURG

## 2019-11-19 RX ORDER — BUPIVACAINE HYDROCHLORIDE 2.5 MG/ML
INJECTION, SOLUTION EPIDURAL; INFILTRATION; INTRACAUDAL AS NEEDED
Status: DISCONTINUED | OUTPATIENT
Start: 2019-11-19 | End: 2019-11-19 | Stop reason: SURG

## 2019-11-19 RX ORDER — NALOXONE HYDROCHLORIDE 0.4 MG/ML
80 INJECTION, SOLUTION INTRAMUSCULAR; INTRAVENOUS; SUBCUTANEOUS AS NEEDED
Status: DISCONTINUED | OUTPATIENT
Start: 2019-11-19 | End: 2019-11-19 | Stop reason: HOSPADM

## 2019-11-19 RX ORDER — ONDANSETRON 2 MG/ML
4 INJECTION INTRAMUSCULAR; INTRAVENOUS EVERY 6 HOURS PRN
Status: DISCONTINUED | OUTPATIENT
Start: 2019-11-19 | End: 2019-11-25

## 2019-11-19 RX ORDER — BUPIVACAINE HYDROCHLORIDE AND EPINEPHRINE 2.5; 5 MG/ML; UG/ML
INJECTION, SOLUTION EPIDURAL; INFILTRATION; INTRACAUDAL; PERINEURAL AS NEEDED
Status: DISCONTINUED | OUTPATIENT
Start: 2019-11-19 | End: 2019-11-19 | Stop reason: SURG

## 2019-11-19 RX ORDER — ONDANSETRON 2 MG/ML
4 INJECTION INTRAMUSCULAR; INTRAVENOUS AS NEEDED
Status: DISCONTINUED | OUTPATIENT
Start: 2019-11-19 | End: 2019-11-19 | Stop reason: HOSPADM

## 2019-11-19 RX ORDER — FAMOTIDINE 20 MG/1
20 TABLET ORAL 2 TIMES DAILY
Status: DISCONTINUED | OUTPATIENT
Start: 2019-11-19 | End: 2019-11-23

## 2019-11-19 RX ORDER — KETOROLAC TROMETHAMINE 15 MG/ML
15 INJECTION, SOLUTION INTRAMUSCULAR; INTRAVENOUS EVERY 6 HOURS PRN
Status: DISPENSED | OUTPATIENT
Start: 2019-11-19 | End: 2019-11-21

## 2019-11-19 RX ADMIN — SODIUM CHLORIDE, SODIUM LACTATE, POTASSIUM CHLORIDE, CALCIUM CHLORIDE: 600; 310; 30; 20 INJECTION, SOLUTION INTRAVENOUS at 09:46:00

## 2019-11-19 RX ADMIN — ROCURONIUM BROMIDE 30 MG: 10 INJECTION, SOLUTION INTRAVENOUS at 10:50:00

## 2019-11-19 RX ADMIN — KETOROLAC TROMETHAMINE 15 MG: 30 INJECTION, SOLUTION INTRAMUSCULAR; INTRAVENOUS at 13:38:00

## 2019-11-19 RX ADMIN — DEXAMETHASONE SODIUM PHOSPHATE 4 MG: 10 INJECTION, SOLUTION INTRAMUSCULAR; INTRAVENOUS at 07:53:00

## 2019-11-19 RX ADMIN — ACETAMINOPHEN 1000 MG: 10 INJECTION, SOLUTION INTRAVENOUS at 13:27:00

## 2019-11-19 RX ADMIN — SODIUM CHLORIDE: 9 INJECTION, SOLUTION INTRAVENOUS at 08:14:00

## 2019-11-19 RX ADMIN — METRONIDAZOLE 500 MG: 500 INJECTION, SOLUTION INTRAVENOUS at 08:28:00

## 2019-11-19 RX ADMIN — ONDANSETRON 4 MG: 2 INJECTION INTRAMUSCULAR; INTRAVENOUS at 13:38:00

## 2019-11-19 RX ADMIN — SODIUM CHLORIDE, SODIUM LACTATE, POTASSIUM CHLORIDE, CALCIUM CHLORIDE: 600; 310; 30; 20 INJECTION, SOLUTION INTRAVENOUS at 07:59:00

## 2019-11-19 RX ADMIN — KETAMINE HYDROCHLORIDE 30 MG: 50 INJECTION, SOLUTION, CONCENTRATE INTRAMUSCULAR; INTRAVENOUS at 08:14:00

## 2019-11-19 RX ADMIN — ALBUMIN, HUMAN INJ 5%: 5 SOLUTION INTRAVENOUS at 09:52:00

## 2019-11-19 RX ADMIN — BUPIVACAINE HYDROCHLORIDE 30 ML: 2.5 INJECTION, SOLUTION EPIDURAL; INFILTRATION; INTRACAUDAL at 07:53:00

## 2019-11-19 RX ADMIN — SODIUM CHLORIDE, SODIUM LACTATE, POTASSIUM CHLORIDE, CALCIUM CHLORIDE: 600; 310; 30; 20 INJECTION, SOLUTION INTRAVENOUS at 13:35:00

## 2019-11-19 RX ADMIN — BUPIVACAINE HYDROCHLORIDE AND EPINEPHRINE 30 ML: 2.5; 5 INJECTION, SOLUTION EPIDURAL; INFILTRATION; INTRACAUDAL; PERINEURAL at 07:53:00

## 2019-11-19 RX ADMIN — DEXAMETHASONE SODIUM PHOSPHATE 4 MG: 10 INJECTION, SOLUTION INTRAMUSCULAR; INTRAVENOUS at 08:14:00

## 2019-11-19 RX ADMIN — MIDAZOLAM HYDROCHLORIDE 2 MG: 1 INJECTION INTRAMUSCULAR; INTRAVENOUS at 07:31:00

## 2019-11-19 RX ADMIN — ALBUMIN, HUMAN INJ 5%: 5 SOLUTION INTRAVENOUS at 13:16:00

## 2019-11-19 RX ADMIN — ROCURONIUM BROMIDE 50 MG: 10 INJECTION, SOLUTION INTRAVENOUS at 07:40:00

## 2019-11-19 RX ADMIN — ROCURONIUM BROMIDE 50 MG: 10 INJECTION, SOLUTION INTRAVENOUS at 08:44:00

## 2019-11-19 RX ADMIN — EPHEDRINE SULFATE 10 MG: 50 INJECTION, SOLUTION INTRAVENOUS at 09:36:00

## 2019-11-19 RX ADMIN — LIDOCAINE HYDROCHLORIDE ANHYDROUS AND DEXTROSE MONOHYDRATE 2 MG/KG/HR: .8; 5 INJECTION, SOLUTION INTRAVENOUS at 08:50:00

## 2019-11-19 RX ADMIN — EPHEDRINE SULFATE 10 MG: 50 INJECTION, SOLUTION INTRAVENOUS at 08:01:00

## 2019-11-19 NOTE — ANESTHESIA PREPROCEDURE EVALUATION
PRE-OP EVALUATION    Patient Name: [facility]    Pre-op Diagnosis: Pseudomyxoma peritonei ([facility] [address] [C78.6]    Procedure(s):  Cytoreductive surgery, possible multi-organ resection, hyperthermic intraperitoneal chemotherapy (HIPEC)    Surgeon(s) and Raven dispense Lidocaine 2.5%, Prilocaine 2.5% cream #480 grams.     Apply 4 grams three to four times daily for treatment of pain (PAINNORM), Disp: 1 each, Rfl: 11  OxyCODONE HCl ER (OXYCONTIN) 40 MG Oral Tablet Extended Release 12 hour Abuse-Deterrent, Take 1 t Performed by Mary Adams MD at [address] [facility]   • ESWL LITHOTRIPSY WITH CYSTOSCOPY, STENT PLACEMENT Left 11/20/2012    Performed by Karey Smalls MD at [address] [place] [address]  11/12    normal   • IR 22 [facility] 11/13/2019    RBC 5.28 11/13/2019    HGB 17.0 11/13/2019    HCT 49.5 11/13/2019    MCV 93.8 11/13/2019    MCH 32.2 11/13/2019    MCHC 34.3 11/13/2019    RDW 12.1 11/13/2019    .0 11/13/2019     Lab Results   Component Value Date     11/13/2019

## 2019-11-19 NOTE — BRIEF OP NOTE
Pre-Operative Diagnosis: Pseudomyxoma peritonei ([facility] [address] [C78.6]     Post-Operative Diagnosis: Pseudomyxoma peritonei ([facility] [address] [C78.6]      Procedure Performed:   Cytoreductive surgery, partial hepatectomy, partial resection right diaphragm, small bowel resectio

## 2019-11-19 NOTE — INTERVAL H&P NOTE
There has been no significant change since the patient was seen as documented in EPIC. Surgery revisted. To proceed as planned.       RALEIGH JoyceC

## 2019-11-19 NOTE — ANESTHESIA PROCEDURE NOTES
Airway  Urgency: elective      General Information and Staff    Patient location during procedure: OR  Anesthesiologist: Tuan Ramon MD  Performed: anesthesiologist     Indications and Patient Condition  Indications for airway management: anesthesia  Noreen

## 2019-11-19 NOTE — OPERATIVE REPORT
[address]    PATIENT'S NAME: Dhaval Vela   ATTENDING PHYSICIAN: Jennifer Moss. Agueda Graves MD   OPERATING PHYSICIAN: Jennifer Moss.  Agueda Graves MD   PATIENT ACCOUNT#:   [id number]    LOCATION:  [address] [place] [address]  MEDICAL RECORD #:   [id number]       DATE OF BIRTH placed a Larsen retractor to help in exposure. It became readily evident that there was involvement at the posterior aspect of the stomach with a palpable mass. As this was exposed, gelatinous mucinous material was drained and sent to Pathology.   We pe anastomosis was fashioned using the EDWIN stapler with the resulting opening reapproximated with a TA stapler. A portion of the staple line was reinforced with 3-0 silk placed in Lembert fashion, and the mesentery was reapproximated using 3-0 silk suture.

## 2019-11-19 NOTE — ANESTHESIA PROCEDURE NOTES
[facility]  Performed by: Heaven Cifuentes MD  Authorized by: Heaven Cifuentes MD       General Information and Staff    Start Time:  11/19/2019 7:46 AM  End Time:  11/19/2019 7:53 AM  Anesthesiologist:  Heaven Cifuentes MD  Performed by:   Anesthesiologist  Coleen

## 2019-11-19 NOTE — ANESTHESIA POSTPROCEDURE EVALUATION
[facility] Patient Status:  Surgery Admit - Inpt   Age/Gender 53year old male MRN [id number]   [facility] SURGERY Attending Hood Todd MD   Hosp Day # 0 PCP Margarita Martinez MD       Anesthesia Post-op Note    Pr

## 2019-11-19 NOTE — CONSULTS
[facility]  [address] [place] [address] Patient Status:  Inpatient    1965 MRN [id number]   [facility] 7NE-A Attending Raquel Vickers MD   Hosp Day # 0 PCP Jimy Mcallister MD     Reason for consult: med management    Reque VIA ILEAL CONDUIT     • LITHOTRIPSY  11/20/12   • LUMBAR EPIDURAL N/A 4/29/2014    Performed by Sommer Vega MD at [address] 4/15/2014    Performed by Sommer Vega MD at [facility]   • NAUSEA AND VOMITING  Morphine                NAUSEA ONLY    Medications:  No current facility-administered medications on file prior to encounter.    acetaminophen 500 MG Oral Tab, Take 1,000 mg by mouth every 6 (six) hours as needed for mood and affect.       Diagnostic Data:      Labs:  Recent Labs   Lab 11/13/19  0802   WBC 9.5   HGB 17.0   MCV 93.8   .0       Recent Labs   Lab 11/13/19  0802   *   BUN 16   CREATSERUM 1.40*   GFRAA 65   GFRNAA 56*   CA 9.2      K 4.3

## 2019-11-20 RX ORDER — DEXTROSE, SODIUM CHLORIDE, AND POTASSIUM CHLORIDE 5; .45; .15 G/100ML; G/100ML; G/100ML
INJECTION INTRAVENOUS CONTINUOUS
Status: DISCONTINUED | OUTPATIENT
Start: 2019-11-20 | End: 2019-11-22

## 2019-11-20 RX ORDER — FUROSEMIDE 10 MG/ML
10 INJECTION INTRAMUSCULAR; INTRAVENOUS ONCE
Status: COMPLETED | OUTPATIENT
Start: 2019-11-20 | End: 2019-11-20

## 2019-11-20 NOTE — PLAN OF CARE
Received patient A/Ox4, 2L nasal cannula, NSR on tele at 0700  Patient advanced to RA, tolerated well  Patient had NG to left nare clamped, residual taken at 1100, 50cc, physician notified, NG d/c  Patient advanced to clear liquid diet, tolerating well  In facility with appropriate resources  Description  INTERVENTIONS:  - Identify barriers to discharge w/pt and caregiver  - Include patient/family/discharge partner in discharge planning  - Arrange for needed discharge resources and transportation as appropri and perform bladder scan as needed  - Follow urinary retention protocol/standard of care  - Consider collaborating with pharmacy to review patient's medication profile  - Implement strategies to promote bladder emptying  Outcome: Progressing     Problem: M prevention bundle as indicated  Outcome: Progressing     Problem: HEMATOLOGIC - ADULT  Goal: Maintains hematologic stability  Description  INTERVENTIONS  - Assess for signs and symptoms of bleeding or hemorrhage  - Monitor labs and vital signs for trends Problem: Impaired Functional Mobility  Goal: Achieve highest/safest level of mobility/gait  Description  Interventions:  - Assess patient's functional ability and stability  - Promote increasing activity/tolerance for mobility and gait  - Educate and eng

## 2019-11-20 NOTE — PHYSICAL THERAPY NOTE
PHYSICAL THERAPY QUICK EVALUATION - INPATIENT    Room Number: 9665/6363-M  Evaluation Date: 11/20/2019  Presenting Problem: Recurrent pseudomyxoma peritonei; s/p HIPEC  Physician Order: PT Eval and Treat    Problem List  Active Problems:    Pseudomyxoma MANAGEMENT   • OTHER SURGICAL HISTORY      bilat knee scopes/multiple each knee   • OTHER SURGICAL HISTORY      multiple ankle procedures/skin surgeries   • OTHER SURGICAL HISTORY  10-24-12    sx-EDW-Left ureteral stone, left pyelonephritisi-Dr. Bo Olea   • TOLERANCE                         O2 WALK  SPO2 on Room Air at Rest: 97  SPO2 Ambulation on Room Air: 89            AM-PAC '6-Clicks' [address]  How much difficulty does the patient currently have. ..  -   Turning over in bed (in comorbidities and personal factors impacting therapy include depression, h/o pseudomyxoma peritonei. Functional outcome measures completed include AMPAC.   Based on this evaluation, patient's clinical presentation is stable and overall evaluation complexit

## 2019-11-20 NOTE — PROGRESS NOTES
11/20/19 1035   Clinical Encounter Type   Visited With Patient and family together   Continue Visiting No   Referral To Nurse;Physician  (POLST referral to the hospitalist/patient signed POLST form/FULL Code.   Please page a  to file signed POLST

## 2019-11-20 NOTE — PROGRESS NOTES
[facility]  Progress Note     Griselda Zepeda Patient Status:  Inpatient    1965 MRN [id number]   [facility] 7NE-A Attending Lucas Garcia MD   Hosp Day # 2 PCP Claudio Ibarra MD     Chief Complaint: HIPEC    S: Patient do ASSESSMENT / PLAN:     1. Mucinous neoplasm sp HIPEC 2013, redo HIPEC 2014 then with pseudomyxoma peritonei sp cytoreductive surgery, partial hepatectomy, partial resection right diaphragm, small bowel resection, HIPEC 11/19  2.  Leukocytosis, improvi

## 2019-11-20 NOTE — PROGRESS NOTES
POD#1 s/p re-do CRS/HIPEC    Patient feels well  Pain controlled    Blood pressure 128/65, pulse 72, temperature 97.6 °F (36.4 °C), temperature source Oral, resp. rate 18, height 1.854 m (6' 1\"), weight 114.6 kg (252 lb 9.6 oz), SpO2 94 %.       Intake/Out

## 2019-11-20 NOTE — PLAN OF CARE
POD 0. Pain tolerable overnight w/ PCA Dilaudid and scheduled Tylenol. IV. Toradol given x 1. Midline surgical drsg in place c/d/i  w/ abd binder. Abd soft , tender. BS (+) x 4. NPO w/ ice chips. Denies any n/v.NG to LIS. Knott intact and draining cl yellow Evaluate effectiveness of ordered antiemetic medications  - Provide nonpharmacologic comfort measures as appropriate  - Advance diet as tolerated, if ordered  - Obtain nutritional consult as needed  - Evaluate fluid balance  Outcome: Progressing  Goal: Memory Doug

## 2019-11-20 NOTE — HOME CARE LIAISON
MET WITH PTNT AND OFFERED CHOICE  OF AGENCIES. PTNT AGREEABLE TO [facility]. MET WITH PTNT TO DISCUSS HOME HEALTH SERVICES AND COVERAGE CRITERIA. PTNT AGREEABLE TO Nick[facility] Lugo. PTNT GIVEN RESIDENTIAL BROCHURE.  RESIDENTIAL WITH PROVIDE SN/PT ON DISC

## 2019-11-21 PROCEDURE — 99231 SBSQ HOSP IP/OBS SF/LOW 25: CPT | Performed by: HOSPITALIST

## 2019-11-21 RX ORDER — ACETAMINOPHEN 500 MG
1000 TABLET ORAL EVERY 6 HOURS
Status: DISCONTINUED | OUTPATIENT
Start: 2019-11-21 | End: 2019-11-25

## 2019-11-21 RX ORDER — OXYCODONE HCL 20 MG/1
20 TABLET, FILM COATED, EXTENDED RELEASE ORAL 2 TIMES DAILY
Status: DISCONTINUED | OUTPATIENT
Start: 2019-11-21 | End: 2019-11-22

## 2019-11-21 RX ORDER — OXYCODONE HYDROCHLORIDE 15 MG/1
15 TABLET, FILM COATED, EXTENDED RELEASE ORAL 2 TIMES DAILY
Status: DISCONTINUED | OUTPATIENT
Start: 2019-11-21 | End: 2019-11-21

## 2019-11-21 RX ORDER — POLYETHYLENE GLYCOL 3350 17 G/17G
17 POWDER, FOR SOLUTION ORAL DAILY
Status: DISCONTINUED | OUTPATIENT
Start: 2019-11-21 | End: 2019-11-23

## 2019-11-21 RX ORDER — TRAMADOL HYDROCHLORIDE 50 MG/1
50 TABLET ORAL EVERY 6 HOURS PRN
Status: DISCONTINUED | OUTPATIENT
Start: 2019-11-21 | End: 2019-11-22

## 2019-11-21 NOTE — PLAN OF CARE
Assumed care at [address]. Pt a/ox4. VSS. NSR per tele. RA. Pca dilaudid, scheduled tylenol iv and prn toradol for pain management. Mid abd incision w/ dressing c/d/I. Abdominal binder in place. +BS. +gas. Tolerating clear liquid diet.   Denies N/V.  IVF in caregiver  - Include patient/family/discharge partner in discharge planning  - Arrange for needed discharge resources and transportation as appropriate  - Identify discharge learning needs (meds, wound care, etc)  - Arrange for interpreters to assist at [facility] with pharmacy to review patient's medication profile  - Implement strategies to promote bladder emptying  Outcome: Progressing  Problem: METABOLIC/FLUID AND ELECTROLYTES - ADULT  Goal: Glucose maintained within prescribed range  Description  INTERVENTIONS: stability  Description  INTERVENTIONS  - Assess for signs and symptoms of bleeding or hemorrhage  - Monitor labs and vital signs for trends  - Administer supportive blood products/factors, fluids and medications as ordered and appropriate  - Administer sup mobility/gait  Description  Interventions:  - Assess patient's functional ability and stability  - Promote increasing activity/tolerance for mobility and gait  - Educate and engage patient/family in tolerated activity level and precautions  - Recommend use

## 2019-11-21 NOTE — PROGRESS NOTES
POD#2 s/p re-do CRS/HIPEC    Afebrile and hemodynamically stable  Good urinary output  Denies N/V  A bite sore this AM, but improved from last night     Blood pressure (!) 166/71, pulse 76, temperature 97.8 °F (36.6 °C), temperature source Oral, resp.  rate

## 2019-11-21 NOTE — PLAN OF CARE
Received report at 0700. Patient alert and oriented x4. Complains of 4/10 pain in abdominal area. Abdominal binder in place, PCA infusing per orders, PO Tylenol added, Oxycontin started. Advancing diet as tolerated.  Knott removed this AM, urinating well si

## 2019-11-21 NOTE — PROGRESS NOTES
Called by RN staff re bulge at incision post coughing. Has some tenderness. Site prepped and explored. Minimal fluid drained serous sanguinous. Fascia intact. NO DEHISCENCE  Comforted patient. To apply binder. Site redressed.     Regina SEBASTIAN

## 2019-11-21 NOTE — DIETARY NOTE
[facility]    NUTRITION ASSESSMENT    Pt does not meet malnutrition criteria. NUTRITION DIAGNOSIS/PROBLEM:    Increased nutrient needs related to increased demands of healing as evidenced by recent HIPEC surgery    NUTRITION INTERVENTION:       1. supplements to maximize  Food Allergies: egg  Cultural/Ethnic/Mosque Preferences Addresses: Yes    NUTRITION RELATED PHYSICAL FINDINGS:     1. Body Fat/Muscle Mass: well nourished per visual exam.     2. Fluid Accumulation: none per visual exam.    NUTR

## 2019-11-21 NOTE — CM/SW NOTE
PT recommending HHPT. Pt lives with his siblings. Pt is agreeable to [facility]. Deejay Veronica from [facility] accepted pt. HH orders and F2F written.

## 2019-11-22 ENCOUNTER — APPOINTMENT (OUTPATIENT)
Dept: CT IMAGING | Facility: HOSPITAL | Age: 54
DRG: 327 | End: 2019-11-22
Attending: PHYSICIAN ASSISTANT
Payer: MEDICARE

## 2019-11-22 PROCEDURE — 99233 SBSQ HOSP IP/OBS HIGH 50: CPT | Performed by: HOSPITALIST

## 2019-11-22 PROCEDURE — 74177 CT ABD & PELVIS W/CONTRAST: CPT | Performed by: PHYSICIAN ASSISTANT

## 2019-11-22 RX ORDER — OXYCODONE HCL 20 MG/1
40 TABLET, FILM COATED, EXTENDED RELEASE ORAL 2 TIMES DAILY
Status: DISCONTINUED | OUTPATIENT
Start: 2019-11-22 | End: 2019-11-25

## 2019-11-22 RX ORDER — SODIUM CHLORIDE, SODIUM LACTATE, POTASSIUM CHLORIDE, CALCIUM CHLORIDE 600; 310; 30; 20 MG/100ML; MG/100ML; MG/100ML; MG/100ML
INJECTION, SOLUTION INTRAVENOUS CONTINUOUS
Status: DISCONTINUED | OUTPATIENT
Start: 2019-11-22 | End: 2019-11-23

## 2019-11-22 RX ORDER — ENOXAPARIN SODIUM 100 MG/ML
40 INJECTION SUBCUTANEOUS DAILY
Qty: 14 SYRINGE | Refills: 0 | Status: SHIPPED | OUTPATIENT
Start: 2019-11-23 | End: 2019-11-25

## 2019-11-22 RX ORDER — CYCLOBENZAPRINE HCL 10 MG
10 TABLET ORAL 3 TIMES DAILY PRN
Status: DISCONTINUED | OUTPATIENT
Start: 2019-11-22 | End: 2019-11-25

## 2019-11-22 RX ORDER — FUROSEMIDE 10 MG/ML
20 INJECTION INTRAMUSCULAR; INTRAVENOUS ONCE
Status: COMPLETED | OUTPATIENT
Start: 2019-11-22 | End: 2019-11-22

## 2019-11-22 RX ORDER — HYDROMORPHONE HYDROCHLORIDE 1 MG/ML
0.8 INJECTION, SOLUTION INTRAMUSCULAR; INTRAVENOUS; SUBCUTANEOUS EVERY 2 HOUR PRN
Status: DISCONTINUED | OUTPATIENT
Start: 2019-11-22 | End: 2019-11-25

## 2019-11-22 RX ORDER — HYDROMORPHONE HYDROCHLORIDE 1 MG/ML
0.4 INJECTION, SOLUTION INTRAMUSCULAR; INTRAVENOUS; SUBCUTANEOUS EVERY 2 HOUR PRN
Status: DISCONTINUED | OUTPATIENT
Start: 2019-11-22 | End: 2019-11-25

## 2019-11-22 RX ORDER — BISACODYL 10 MG
10 SUPPOSITORY, RECTAL RECTAL ONCE
Status: COMPLETED | OUTPATIENT
Start: 2019-11-22 | End: 2019-11-22

## 2019-11-22 RX ORDER — TRAMADOL HYDROCHLORIDE 50 MG/1
100 TABLET ORAL EVERY 6 HOURS PRN
Status: DISCONTINUED | OUTPATIENT
Start: 2019-11-22 | End: 2019-11-25

## 2019-11-22 NOTE — PLAN OF CARE
Assumed care at [address] [place] [address]. AOx4. C/o abdominal incision pain 4-5 out of 10. Continue on PCA Dilaudid. Poor appetite per pt. Passing gas. Abdominal binder in place. Midline incision dressing C/D/I. Ambulates in room. Plan of care discussed with pt.  Call Department for coordinating discharge planning if the patient needs post-hospital services based on physician/LIP order or complex needs related to functional status, cognitive ability or social support system  Outcome: Progressing     Problem: Ernesto Morning intake and initiate nutrition consult as needed  - Instruct patient on self management of diabetes  Outcome: Progressing  Goal: Electrolytes maintained within normal limits  Description  INTERVENTIONS:  - Monitor labs and rhythm and assess patient for sign enemas and rectal medication administration  - Ensure safe mobilization of patient  - Hold pressure on venipuncture sites to achieve adequate hemostasis  - Assess for signs and symptoms of internal bleeding  - Monitor lab trends  - Patient is to report abn

## 2019-11-22 NOTE — DIETARY NOTE
Clinical Nutrition    Pt is a part of Immuno-Nutrition Surgery . As such, pt is to receive one immuno-nutrition surgery drink BID x 5 days post surgery. POD#2 - 100% of one drink consumed.  Only received 1 of 2 drinks  POD#3 - Provided 2 drinks at b

## 2019-11-22 NOTE — PROGRESS NOTES
[facility]  Progress Note     Asha Alcaraz Patient Status:  Inpatient    1965 MRN [id number]   [facility] 7NE-A Attending Skip Short MD   Hosp Day # 3 PCP Ophelia Vasquez MD     Chief Complaint: HIPEC    S: Patient fe acetaminophen  1,000 mg Oral Q6H   • PEG 3350  17 g Oral Daily   • enoxaparin  40 mg Subcutaneous Daily   • famoTIDine  20 mg Oral BID    Or   • famoTIDine  20 mg Intravenous BID       ASSESSMENT / PLAN:     1.  Mucinous neoplasm sp HIPEC 2013, redo HIPEC 2

## 2019-11-22 NOTE — PLAN OF CARE
Received report at 0700. Patient alert and oriented x4. CT abdomen and pelvis ok per MD. NPO except sips with clears. IV fluids. Pain level manageable, feels much better than earlier. Updated patient about POC and NPO status, agreeable.  Will reassess tomor

## 2019-11-22 NOTE — PROGRESS NOTES
11/22/19 1037   Clinical Encounter Type   Visited With Patient; Health care provider   Referral To Nurse  ( scanned signed POLST form, dated 11/22/2019, into patient's electronic resuscitation wishes/POLST record.  )

## 2019-11-22 NOTE — PROGRESS NOTES
PCT called saying patient was having worsening acute abdominal pain. Came in to assess patient. Patient is cool, clammy, chin quivering, tachynip, very restless. Abdomen somewhat rigid, very tender.  Patient has guarding and winces, moans and has guarding w

## 2019-11-23 PROCEDURE — 99233 SBSQ HOSP IP/OBS HIGH 50: CPT | Performed by: HOSPITALIST

## 2019-11-23 RX ORDER — FUROSEMIDE 10 MG/ML
20 INJECTION INTRAMUSCULAR; INTRAVENOUS
Status: COMPLETED | OUTPATIENT
Start: 2019-11-23 | End: 2019-11-23

## 2019-11-23 RX ORDER — FUROSEMIDE 10 MG/ML
20 INJECTION INTRAMUSCULAR; INTRAVENOUS ONCE
Status: DISCONTINUED | OUTPATIENT
Start: 2019-11-23 | End: 2019-11-23

## 2019-11-23 RX ORDER — DEXTROSE, SODIUM CHLORIDE, AND POTASSIUM CHLORIDE 5; .45; .15 G/100ML; G/100ML; G/100ML
INJECTION INTRAVENOUS CONTINUOUS
Status: DISCONTINUED | OUTPATIENT
Start: 2019-11-23 | End: 2019-11-24

## 2019-11-23 NOTE — PLAN OF CARE
Paged Dr. Brody Vizcarra regarding black tarry stool. No response from Dr. Brody Vizcarra yet. Endorsed to am nurse. Will continue to monitor.

## 2019-11-23 NOTE — PLAN OF CARE
Assumed care at [facility]. AOx4. Pain is controlled with Oxy. Abdominal incision dressing C/D/I. Abdominal binder in place.  (+) Flatus. Ambulates in room. IV fluid infusing. NPO except ice chips. Plan of care discussed with pt. Call light within reach. coordinating discharge planning if the patient needs post-hospital services based on physician/LIP order or complex needs related to functional status, cognitive ability or social support system  Outcome: Progressing     Problem: GASTROINTESTINAL - ADULT nutrition consult as needed  - Instruct patient on self management of diabetes  Outcome: Progressing  Goal: Electrolytes maintained within normal limits  Description  INTERVENTIONS:  - Monitor labs and rhythm and assess patient for signs and symptoms of el medication administration  - Ensure safe mobilization of patient  - Hold pressure on venipuncture sites to achieve adequate hemostasis  - Assess for signs and symptoms of internal bleeding  - Monitor lab trends  - Patient is to report abnormal signs of ble

## 2019-11-23 NOTE — PROGRESS NOTES
POD#2 s/p re-do CRS/HIPEC    No acute events overnight. Stable and afebrile. Pain under much better control. Passing gas, had bowel movement. Blood pressure 151/70, pulse 84, temperature 98.1 °F (36.7 °C), temperature source Oral, resp.  rate 16, heig

## 2019-11-23 NOTE — PLAN OF CARE
Assumed care at 0700. Pt A/O x4. RA. NSR on tele. VSS. Midline incision with staples. Bottom half of incision with gauze  And tape. Pain well controlled with scheduled oxy and scheduled  Tylenol. Tramadol given x1. Tolerating clear liquids.  Denies any moises ability to be responsible for managing their own health  - Refer to Case Management Department for coordinating discharge planning if the patient needs post-hospital services based on physician/LIP order or complex needs related to functional status, cogni maintain glucose within target range  - Assess barriers to adequate nutritional intake and initiate nutrition consult as needed  - Instruct patient on self management of diabetes  Outcome: Progressing  Goal: Electrolytes maintained within normal limits  Marcie Arana patient with low platelets)  INTERVENTIONS:  - Avoid intramuscular injections, enemas and rectal medication administration  - Ensure safe mobilization of patient  - Hold pressure on venipuncture sites to achieve adequate hemostasis  - Assess for signs and

## 2019-11-23 NOTE — PROGRESS NOTES
Called by RN d/t large dark BM, patient with bloody BM x 1 yesterday.     Plan:  NPO - strict  IVF  Stop H2B, start PPI IV BID  Stop Lovenox  Labs pending, check type and screen  Check FOBT  Add iron and ferritin  Monitor hemodynamics  RN to contact Dr. Supa Gay

## 2019-11-23 NOTE — PROGRESS NOTES
[facility]  Progress Note     Theta Batch Patient Status:  Inpatient    1965 MRN [id number]   [facility] 7NE-A Attending Amanda Carpio MD   Hosp Day # 4 PCP Osmany Reid MD     Chief Complaint: HIPEC    S: Patient do reviewed in Epic. Medications:   • pantoprazole (PROTONIX) IV push  40 mg Intravenous Q12H   • furosemide  20 mg Intravenous Once   • oxyCODONE HCl ER  40 mg Oral BID   • acetaminophen  1,000 mg Oral Q6H       ASSESSMENT / PLAN:     1. Melena  2.  [facility]

## 2019-11-23 NOTE — DIETARY NOTE
Clinical Nutrition- Immuno-Nutrition Study  POD #1- 100% of 1 Ensure Immuno-drinks. Pt feels he drank the drink too fast.  Had severe abdominal pain after. Did not drink 2nd drink. POD #2- Pt states he had a BM and feels much better than yesterday.   Abd

## 2019-11-24 PROCEDURE — 99233 SBSQ HOSP IP/OBS HIGH 50: CPT | Performed by: HOSPITALIST

## 2019-11-24 RX ORDER — ENOXAPARIN SODIUM 100 MG/ML
40 INJECTION SUBCUTANEOUS DAILY
Status: DISCONTINUED | OUTPATIENT
Start: 2019-11-24 | End: 2019-11-25

## 2019-11-24 RX ORDER — FEBUXOSTAT 80 MG/1
80 TABLET, FILM COATED ORAL DAILY
Status: DISCONTINUED | OUTPATIENT
Start: 2019-11-24 | End: 2019-11-25

## 2019-11-24 RX ORDER — PANTOPRAZOLE SODIUM 40 MG/1
40 TABLET, DELAYED RELEASE ORAL
Status: DISCONTINUED | OUTPATIENT
Start: 2019-11-25 | End: 2019-11-25

## 2019-11-24 RX ORDER — FEBUXOSTAT 40 MG/1
2 TABLET, FILM COATED ORAL DAILY
Status: DISCONTINUED | OUTPATIENT
Start: 2019-11-24 | End: 2019-11-24 | Stop reason: SDUPTHER

## 2019-11-24 RX ORDER — FUROSEMIDE 10 MG/ML
20 INJECTION INTRAMUSCULAR; INTRAVENOUS ONCE
Status: COMPLETED | OUTPATIENT
Start: 2019-11-24 | End: 2019-11-24

## 2019-11-24 NOTE — PROGRESS NOTES
[facility]  Progress Note     Jie Russell Patient Status:  Inpatient    1965 MRN [id number]   [facility] 7NE-A Attending Sherin Agee MD   Hosp Day # 5 PCP Choco Doshi MD     Chief Complaint: HIPEC    S: Patient do last 168 hours. No results for input(s): TROP, CK in the last 168 hours. Imaging: Imaging data reviewed in Epic.     Medications:   • Pantoprazole Sodium  40 mg Oral QAM AC   • Febuxostat  1 tablet Oral Daily   • oxyCODONE HCl ER  40 mg Oral BID

## 2019-11-24 NOTE — PROGRESS NOTES
POD#5 s/p re-do CRS/HIPEC    No acute events overnight. Stable and afebrile. Tolerating diet readily. Passing gas, having bowel movements. Blood pressure 139/62, pulse 74, temperature 97.7 °F (36.5 °C), temperature source Oral, resp.  rate 16, height

## 2019-11-24 NOTE — PLAN OF CARE
Received report at 0700. Patient alert and oriented x4. Complains of minimal surgical pain. Ambulating ad annabelle in hallway. IV fluids d/c'd. Advance diet as tolerated. Tolerated full liquid diet for lunch. Family at bedside. Soft, brown BM.  Medications given

## 2019-11-24 NOTE — PLAN OF CARE
Assumed care at 1900  AOx4, RA, SR via tele  Tolerating clear liquids  Abdomen soft, active bowel sounds  Abdominal binder in place  Incision with staples, small dressing at bottom C/D/I  Pain controlled with scheduled oxy and tylenol  Denies nausea, up ad

## 2019-11-25 VITALS
OXYGEN SATURATION: 92 % | DIASTOLIC BLOOD PRESSURE: 74 MMHG | WEIGHT: 243 LBS | SYSTOLIC BLOOD PRESSURE: 126 MMHG | HEART RATE: 81 BPM | TEMPERATURE: 98 F | RESPIRATION RATE: 19 BRPM | HEIGHT: 73 IN | BODY MASS INDEX: 32.2 KG/M2

## 2019-11-25 PROCEDURE — 99231 SBSQ HOSP IP/OBS SF/LOW 25: CPT | Performed by: HOSPITALIST

## 2019-11-25 RX ORDER — OXYCODONE HCL 40 MG/1
40 TABLET, FILM COATED, EXTENDED RELEASE ORAL 2 TIMES DAILY
Qty: 30 TABLET | Refills: 0 | Status: SHIPPED | OUTPATIENT
Start: 2019-11-25 | End: 2020-07-01

## 2019-11-25 RX ORDER — ENOXAPARIN SODIUM 100 MG/ML
40 INJECTION SUBCUTANEOUS DAILY
Qty: 14 SYRINGE | Refills: 0 | Status: SHIPPED | OUTPATIENT
Start: 2019-11-25 | End: 2019-12-18 | Stop reason: ALTCHOICE

## 2019-11-25 NOTE — CM/SW NOTE
Pt is ready for d/c today. Pt will go home with Burt Romero from [facility] aware.

## 2019-11-25 NOTE — PROGRESS NOTES
NURSING DISCHARGE NOTE    All consults signed off on patient. Discharge AVS printed and reviewed with patient. Lovenox teaching completed. Patient gave himself Lovenox shot and feels comfortable administering at home. Follow-ups reviewed.  Patient okay

## 2019-11-25 NOTE — PLAN OF CARE
Problem: Patient/Family Goals  Goal: Patient/Family Long Term Goal  Description  Patient's Long Term Goal: To go home    Interventions:  - Pain control  - Ambulate  - See additional Care Plan goals for specific interventions   Outcome: Adequate for Disch assist at discharge as needed  - Consider post-discharge preferences of patient/family/discharge partner  - Complete POLST form as appropriate  - Assess patient's ability to be responsible for managing their own health  - Refer to Case Management Smitha Ma Glucose maintained within prescribed range  Description  INTERVENTIONS:  - Monitor Blood Glucose as ordered  - Assess for signs and symptoms of hyperglycemia and hypoglycemia  - Administer ordered medications to maintain glucose within target range  - Asse Administer supportive blood products/factors, fluids and medications as ordered and appropriate  - Administer supportive blood products/factors as ordered and appropriate  Outcome: Adequate for Discharge  Goal: Free from bleeding injury  Description  (Exam for mobility and gait  - Educate and engage patient/family in tolerated activity level and precautions  - Recommend use of  RW for transfers and ambulation   Outcome: Adequate for Discharge

## 2019-11-25 NOTE — PROGRESS NOTES
POD#6 s/p re-do CRS/HIPEC    Doing well    Blood pressure 141/78, pulse 74, temperature 98.4 °F (36.9 °C), temperature source Oral, resp. rate 16, height 1.854 m (6' 1\"), weight 110.2 kg (243 lb), SpO2 93 %.       Intake/Output Summary (Last 24 hours) at 1

## 2019-11-25 NOTE — DIETARY NOTE
Clinical Nutrition- Immuno-Nutrition Study    POD #1- 100% of 1 Ensure Immuno-drinks. Pt feels he drank the drink too fast.  Had severe abdominal pain after. Did not drink 2nd drink. POD #2- Pt states he had a BM and feels much better than yesterday.   A

## 2019-11-25 NOTE — PROGRESS NOTES
[facility]  Progress Note     Anishtrevorbenita Twanmark Patient Status:  Inpatient    1965 MRN [id number]   [facility] 7NE-A Attending Alberto Greene MD   [facility] Day # 6 PCP Mario Thompson MD     Chief Complaint: HIPEC    S: Patient do 168 hours. No results for input(s): TROP, CK in the last 168 hours. Imaging: Imaging data reviewed in Epic.     Medications:   • Pantoprazole Sodium  40 mg Oral QAM AC   • enoxaparin  40 mg Subcutaneous Daily   • Febuxostat  80 mg Oral Daily   •

## 2019-11-25 NOTE — PLAN OF CARE
Assumed care at 1900  AOx4, RA, SR via tele  Tolerating diet, no nausea  Abdomen soft, active bowel sounds  Abdominal binder in place  Incision with staples, dressing at bottom of incision C/D/I  Pain controlled with scheduled oxy and tylenol  Denies nause

## 2019-11-26 ENCOUNTER — TELEPHONE (OUTPATIENT)
Dept: SURGERY | Facility: CLINIC | Age: 54
End: 2019-11-26

## 2019-11-26 NOTE — DISCHARGE SUMMARY
[facility]  Discharge Summary    Deidre Clarkeis Patient Status:  Inpatient    1965 MRN [id number]   [facility] 7NE-A Attending No att. providers found   Hosp Day # 6 PCP Maxi Gibbons MD     Date of Admission: 2019 1.8 x 1.3 cm). Several small gastrohepatic and portacaval nodes unchanged or smaller. 5/12/2015: CEA---> 3.6  11/19/2015: MRI--->Stable serosal T2 hyperintense lesion on the medial aspect of right lobe of the liver measuring 20 x 20 mm.  This lesion demons  Nonobstructing calculi in both kidneys are stable.   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  Small lesions along the right lobe o hour Abuse-Deterrent  Take 1 tablet (40 mg total) by mouth 2 (two) times daily. , Normal, Disp-30 tablet, R-0    Enoxaparin Sodium 40 MG/0.4ML Subcutaneous Solution  Inject 0.4 mL (40 mg total) into the skin daily. , Normal, Disp-14 Syringe, R-0      CONTINU

## 2019-11-26 NOTE — TELEPHONE ENCOUNTER
Patient states he is doing very well since discharge. Pain is controlled with medications. Reports adequate po intake of food and fluids. Post op appointment confirmed for Monday.

## 2019-12-02 ENCOUNTER — OFFICE VISIT (OUTPATIENT)
Dept: SURGERY | Facility: CLINIC | Age: 54
End: 2019-12-02
Payer: MEDICARE

## 2019-12-02 VITALS
DIASTOLIC BLOOD PRESSURE: 81 MMHG | HEIGHT: 73 IN | BODY MASS INDEX: 31.44 KG/M2 | HEART RATE: 95 BPM | RESPIRATION RATE: 18 BRPM | OXYGEN SATURATION: 96 % | WEIGHT: 237.19 LBS | SYSTOLIC BLOOD PRESSURE: 121 MMHG | TEMPERATURE: 98 F

## 2019-12-02 DIAGNOSIS — C78.6 PSEUDOMYXOMA PERITONEI (HCC): Primary | ICD-10-CM

## 2019-12-02 PROCEDURE — 99024 POSTOP FOLLOW-UP VISIT: CPT | Performed by: PHYSICIAN ASSISTANT

## 2019-12-02 NOTE — PROGRESS NOTES
[facility]    Patient Name:  Yoselin London   YOB: 1965   Gender:  Male   Appt Date:  12/2/2019   Provider:  MELQUIADES Lui   Insurance:  [address] [place] [address]  Primary Care Provider:Jose Lu, smaller. 5/12/2015: CEA---> 3.6  11/19/2015: MRI--->Stable serosal T2 hyperintense lesion on the medial aspect of right lobe of the liver measuring 20 x 20 mm. This lesion demonstrates restricted diffusion and rim enhancement.  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  Small lesions along the right lobe of the liver are stable.   7/10/18: CEA: 3.8, CA 19-9: 11.5, CA-125: 3.2  7/10/18: into the skin daily. , Disp: 14 Syringe, Rfl: 0  •  acetaminophen 500 MG Oral Tab, Take 1,000 mg by mouth every 6 (six) hours as needed for Pain., Disp: , Rfl:   •  Febuxostat (ULORIC) 80 MG Oral Tab, Take 1 tablet by mouth once daily. , Disp: 90 tablet, Rfl Tony Lawson MD;  Location: [address] [place] [address]   • Colonoscopy,remv jo,snare  11/5/2012    Procedure: ESOPHAGOGASTRODUODENOSCOPY, COLONOSCOPY, POSSIBLE BIOPSY, POSSIBLE POLYPECTOMY 77430,02351;  Surgeon: Tony Lawson MD;  Location:  Procedure: LUMBAR EPIDURAL;  Surgeon: Meaghan Banks MD;  Location: [facility]   • Ir ureter tube removal via ileal conduit     • Lithotripsy  11/20/12   • Other surgical history      bilat knee scopes/multiple each knee   • Other surg Joleen Caraballo MD;  Location: [address] [place] [address]   • Patient documented not to have experienced any of the following events N/A 1/25/2016    Procedure: COLONOSCOPY, POSSIBLE BIOPSY, POSSIBLE POLYPECTOMY 04460;  Surgeon: Fredy Mandel MD;  Maciej Wong Former Smoker        Packs/day: 1.50        Years: 16.00        Pack years: 24        Types: Cigarettes, Cigars        Quit date: 2016        Years since quittin.9      Smokeless tobacco: Never Used      Tobacco comment: quit Dec 2016.  1 cigar pe with acellular mucin, histiocytic reaction and chronic inflammation.     I.   Stomach, greater curvature tumor, excision:  -Fibroadipose tissue with acellular mucin, histiocytic reaction and chronic inflammation.     J.  Small bowel, resection:  -Segment of

## 2019-12-03 ENCOUNTER — TELEPHONE (OUTPATIENT)
Dept: SCHEDULING | Age: 54
End: 2019-12-03

## 2019-12-03 ENCOUNTER — TELEPHONE (OUTPATIENT)
Dept: SURGERY | Facility: CLINIC | Age: 54
End: 2019-12-03

## 2019-12-03 NOTE — TELEPHONE ENCOUNTER
Patient has declined home PT services from [facility]. Verbal order provided to d/c pt order, will await faxed order.

## 2019-12-11 ENCOUNTER — OFFICE VISIT (OUTPATIENT)
Dept: INTERNAL MEDICINE | Age: 54
End: 2019-12-11

## 2019-12-11 ENCOUNTER — OFFICE VISIT (OUTPATIENT)
Dept: SURGERY | Facility: CLINIC | Age: 54
End: 2019-12-11
Payer: MEDICARE

## 2019-12-11 VITALS
DIASTOLIC BLOOD PRESSURE: 78 MMHG | BODY MASS INDEX: 31.29 KG/M2 | HEIGHT: 73 IN | TEMPERATURE: 99.7 F | SYSTOLIC BLOOD PRESSURE: 118 MMHG | HEART RATE: 78 BPM | WEIGHT: 236.11 LBS

## 2019-12-11 VITALS
TEMPERATURE: 97 F | BODY MASS INDEX: 31.39 KG/M2 | WEIGHT: 236.81 LBS | OXYGEN SATURATION: 96 % | HEART RATE: 94 BPM | SYSTOLIC BLOOD PRESSURE: 129 MMHG | HEIGHT: 73 IN | RESPIRATION RATE: 18 BRPM | DIASTOLIC BLOOD PRESSURE: 80 MMHG

## 2019-12-11 DIAGNOSIS — G89.4 CHRONIC PAIN DISORDER: ICD-10-CM

## 2019-12-11 DIAGNOSIS — C78.6 PERITONEAL CARCINOMATOSIS (CMD): Primary | ICD-10-CM

## 2019-12-11 DIAGNOSIS — C78.6 PSEUDOMYXOMA PERITONEI (HCC): Primary | ICD-10-CM

## 2019-12-11 DIAGNOSIS — M1A.9XX0 CHRONIC GOUT WITHOUT TOPHUS, UNSPECIFIED CAUSE, UNSPECIFIED SITE: ICD-10-CM

## 2019-12-11 PROCEDURE — 99024 POSTOP FOLLOW-UP VISIT: CPT | Performed by: SURGERY

## 2019-12-11 PROCEDURE — 99214 OFFICE O/P EST MOD 30 MIN: CPT | Performed by: INTERNAL MEDICINE

## 2019-12-11 RX ORDER — LIDOCAINE AND PRILOCAINE 25; 25 MG/G; MG/G
CREAM TOPICAL
COMMUNITY
Start: 2017-05-18

## 2019-12-11 RX ORDER — OXYCODONE HCL 40 MG/1
40 TABLET, FILM COATED, EXTENDED RELEASE ORAL EVERY 12 HOURS SCHEDULED
Qty: 60 TABLET | Refills: 0 | Status: SHIPPED | OUTPATIENT
Start: 2019-12-11 | End: 2020-01-20 | Stop reason: SDUPTHER

## 2019-12-11 SDOH — HEALTH STABILITY: MENTAL HEALTH: HOW OFTEN DO YOU HAVE A DRINK CONTAINING ALCOHOL?: NEVER

## 2019-12-11 ASSESSMENT — PATIENT HEALTH QUESTIONNAIRE - PHQ9
2. FEELING DOWN, DEPRESSED OR HOPELESS: NOT AT ALL
1. LITTLE INTEREST OR PLEASURE IN DOING THINGS: NOT AT ALL
SUM OF ALL RESPONSES TO PHQ9 QUESTIONS 1 AND 2: 0
SUM OF ALL RESPONSES TO PHQ9 QUESTIONS 1 AND 2: 0

## 2019-12-11 NOTE — PROGRESS NOTES
[facility]    Patient Name:  Agpaito Leung   YOB: 1965   Gender:  Male   Appt Date:  12/11/2019   Provider:  MELQUIADES Liu   Insurance:  [address] [place] [address]  Primary Care Provider:Jose Martinez, smaller. 5/12/2015: CEA---> 3.6  11/19/2015: MRI--->Stable serosal T2 hyperintense lesion on the medial aspect of right lobe of the liver measuring 20 x 20 mm. This lesion demonstrates restricted diffusion and rim enhancement.  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  Small lesions along the right lobe of the liver are stable.   7/10/18: CEA: 3.8, CA 19-9: 11.5, CA-125: 3.2  7/10/18: 0  •  acetaminophen 500 MG Oral Tab, Take 1,000 mg by mouth every 6 (six) hours as needed for Pain., Disp: , Rfl:   •  Febuxostat (ULORIC) 80 MG Oral Tab, Take 1 tablet by mouth once daily. , Disp: 90 tablet, Rfl: 1  •  lidocaine-prilocaine 2.5-2.5 % Extern Brandin   • Colonoscopy,remv lesn,snare  11/5/2012    Procedure: ESOPHAGOGASTRODUODENOSCOPY, COLONOSCOPY, POSSIBLE BIOPSY, POSSIBLE POLYPECTOMY 79658,94460;  Surgeon: Germán Wilkins MD;  Location: [address] [place] [address]   • Colonoscpy, flexible, MD;  Location: [facility]   • Ir ureter tube removal via ileal conduit     • Lithotripsy  11/20/12   • Other surgical history      bilat knee scopes/multiple each knee   • Other surgical history      multiple ankle procedures/skin surge • Patient documented not to have experienced any of the following events N/A 1/25/2016    Procedure: COLONOSCOPY, POSSIBLE BIOPSY, POSSIBLE POLYPECTOMY 00747;  Surgeon: Silvestre Jimenez MD;  Location: [address] [place] [address]   • Patient with preopera 16.00        Pack years: 24        Types: Cigarettes, Cigars        Quit date: 12/6/2016        Years since quitting: 3.0      Smokeless tobacco: Never Used      Tobacco comment: quit Dec 2016.  1 cigar per day    Alcohol use: No      Alcohol/week: 0.0 sta and acellular mucin.     H. Left upper quadrant, excision:  -Fibrous tissue with acellular mucin, histiocytic reaction and chronic inflammation.     I.   Stomach, greater curvature tumor, excision:  -Fibroadipose tissue with acellular mucin, histiocytic re

## 2019-12-15 ENCOUNTER — APPOINTMENT (OUTPATIENT)
Dept: CT IMAGING | Facility: HOSPITAL | Age: 54
End: 2019-12-15
Attending: EMERGENCY MEDICINE
Payer: MEDICARE

## 2019-12-15 ENCOUNTER — HOSPITAL ENCOUNTER (EMERGENCY)
Facility: HOSPITAL | Age: 54
Discharge: HOME | End: 2019-12-15
Attending: EMERGENCY MEDICINE
Payer: MEDICARE

## 2019-12-15 VITALS
WEIGHT: 235 LBS | HEART RATE: 87 BPM | TEMPERATURE: 98 F | RESPIRATION RATE: 18 BRPM | DIASTOLIC BLOOD PRESSURE: 84 MMHG | OXYGEN SATURATION: 92 % | SYSTOLIC BLOOD PRESSURE: 111 MMHG | BODY MASS INDEX: 31 KG/M2

## 2019-12-15 DIAGNOSIS — R10.9 ABDOMINAL PAIN OF UNKNOWN ETIOLOGY: Primary | ICD-10-CM

## 2019-12-15 PROCEDURE — 83690 ASSAY OF LIPASE: CPT | Performed by: EMERGENCY MEDICINE

## 2019-12-15 PROCEDURE — 96375 TX/PRO/DX INJ NEW DRUG ADDON: CPT

## 2019-12-15 PROCEDURE — 99285 EMERGENCY DEPT VISIT HI MDM: CPT

## 2019-12-15 PROCEDURE — 96376 TX/PRO/DX INJ SAME DRUG ADON: CPT

## 2019-12-15 PROCEDURE — 74177 CT ABD & PELVIS W/CONTRAST: CPT | Performed by: EMERGENCY MEDICINE

## 2019-12-15 PROCEDURE — 80053 COMPREHEN METABOLIC PANEL: CPT | Performed by: EMERGENCY MEDICINE

## 2019-12-15 PROCEDURE — 87086 URINE CULTURE/COLONY COUNT: CPT | Performed by: EMERGENCY MEDICINE

## 2019-12-15 PROCEDURE — 96361 HYDRATE IV INFUSION ADD-ON: CPT

## 2019-12-15 PROCEDURE — 96374 THER/PROPH/DIAG INJ IV PUSH: CPT

## 2019-12-15 PROCEDURE — 81001 URINALYSIS AUTO W/SCOPE: CPT | Performed by: EMERGENCY MEDICINE

## 2019-12-15 PROCEDURE — 85025 COMPLETE CBC W/AUTO DIFF WBC: CPT | Performed by: EMERGENCY MEDICINE

## 2019-12-15 RX ORDER — HYDROMORPHONE HYDROCHLORIDE 1 MG/ML
1 INJECTION, SOLUTION INTRAMUSCULAR; INTRAVENOUS; SUBCUTANEOUS EVERY 30 MIN PRN
Status: DISCONTINUED | OUTPATIENT
Start: 2019-12-15 | End: 2019-12-15

## 2019-12-15 RX ORDER — ONDANSETRON 2 MG/ML
4 INJECTION INTRAMUSCULAR; INTRAVENOUS ONCE
Status: COMPLETED | OUTPATIENT
Start: 2019-12-15 | End: 2019-12-15

## 2019-12-15 RX ORDER — HYDROMORPHONE HYDROCHLORIDE 2 MG/1
2 TABLET ORAL EVERY 6 HOURS PRN
Qty: 15 TABLET | Refills: 0 | Status: SHIPPED | OUTPATIENT
Start: 2019-12-15 | End: 2019-12-22

## 2019-12-15 NOTE — ED PROVIDER NOTES
Patient Seen in: [facility] Emergency Department      History   Patient presents with:  Abdomen/Flank Pain  Postop/Procedure Problem    Stated Complaint:     HPI     Patient presents with abdominal pain.   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)   • COLONOSCOPY, POSSIBLE BIOPSY, POSSIBLE POLYPECTOMY 52248 N/A 1/25/2016    Performed by Sol Sandoval MD at [facility] 11/20/2012    Performed by Kaela Saul EVENTS  11/20/2012    Procedure: CYSTOSCOPY WITH REMOVAL OF STENT;  Surgeon: Dona Dixon MD;  Location: [address] [place] [address]   • PERITONECTOMY WITH HYPERTHEMIC INTRAPERITONEAL CHEMOTHERAPY (HIPEC) N/A 11/19/2019    Performed by Young Watson MD at 52 (*)     Total Protein 8.7 (*)     Globulin  4.9 (*)     A/G Ratio 0.8 (*)     All other components within normal limits   CBC W/ DIFFERENTIAL - Abnormal; Notable for the following components:    WBC 15.0 (*)     Neutrophil Absolute Prelim 9.74 (*)     N FINDINGS:  LIVER:  No enlargement, atrophy, abnormal density, or significant focal lesion. BILIARY:  No biliary ductal dilatation. The gallbladder is slightly distended, appearing similar to the previous study without evidence of stones.  PANCREAS:  There disease. CONCLUSION:  1. No acute process is identified. 2. There has been interval development of a ovoid fluid collection along the pancreatic tail measuring 1.9 x 2.1 x 3.5 cm in greatest dimensions.   Given the patient's history of pseudomyxoma pe comfortable going home.     Disposition and Plan     Clinical Impression:  Abdominal pain of unknown etiology  (primary encounter diagnosis)    Disposition:  Discharge  12/15/2019 11:02 am    Follow-up:  Elsa Qureshi MD  [address]

## 2019-12-15 NOTE — ED INITIAL ASSESSMENT (HPI)
54YM c/c of abd pain Pt state that he had cytoreductive surgery on 11/19 Pt state he pain every night since pt that the pain has become unbearable. Pt state the pain is generalized through out the abd. Pt denies any NVD.

## 2019-12-18 ENCOUNTER — OFFICE VISIT (OUTPATIENT)
Dept: SURGERY | Facility: CLINIC | Age: 54
End: 2019-12-18
Payer: MEDICARE

## 2019-12-18 VITALS
TEMPERATURE: 98 F | DIASTOLIC BLOOD PRESSURE: 80 MMHG | BODY MASS INDEX: 31 KG/M2 | HEART RATE: 84 BPM | WEIGHT: 232.38 LBS | SYSTOLIC BLOOD PRESSURE: 117 MMHG

## 2019-12-18 DIAGNOSIS — C78.6 PSEUDOMYXOMA PERITONEI (HCC): Primary | ICD-10-CM

## 2019-12-18 PROCEDURE — 99024 POSTOP FOLLOW-UP VISIT: CPT | Performed by: SURGERY

## 2019-12-20 NOTE — PROGRESS NOTES
[facility] Surgical Oncology    Patient Name:  Sravani Block   YOB: 1965   Gender:  Male   Appt Date:  12/18/2019   Provider:  Gibson Williamson MD   Insurance:  [address] [place] [address]  Primary Care Provider:Jose Barth, 1.3 cm). Several small gastrohepatic and portacaval nodes unchanged or smaller. 5/12/2015: CEA---> 3.6  11/19/2015: MRI--->Stable serosal T2 hyperintense lesion on the medial aspect of right lobe of the liver measuring 20 x 20 mm.  This lesion demonstrates  Nonobstructing calculi in both kidneys are stable.   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  Small lesions along the right lobe o Rfl: 11  •  CUSTOM MEDICATION, Diclofenac Sodium 1.5% Solution #300ml (2 bottles)--apply 40 drops (1.3 ml) to each painful area (up to 2 areas) three to four times daily (max 10.4ml daily) (Diclof)  AND dispense Lidocaine 2.5%, Prilocaine 2.5% cream #480 g POSSIBLE POLYPECTOMY 98382;  Surgeon: Chula Longo MD;  Location: [address] [place] [address]   • Colonoscopy,remv lesn,snare  11/5/2012    Procedure: ESOPHAGOGASTRODUODENOSCOPY, COLONOSCOPY, POSSIBLE BIOPSY, POSSIBLE POLYPECTOMY 04303,98385;  Surgeon: epidural/subarachnoid; lumbar/sacral  4/29/2014    Procedure: LUMBAR EPIDURAL;  Surgeon: Flor Perea MD;  Location: [facility]   • Ir ureter tube removal via ileal conduit     • Lithotripsy  11/20/12   • Other surgical history Procedure: LUMBAR EPIDURAL;  Surgeon: Sushil Boogie MD;  Location: [address] [place] [address]   • Patient documented not to have experienced any of the following events N/A 1/25/2016    Procedure: COLONOSCOPY, POSSIBLE BIOPSY, POSSIBLE POLYPECTOMY History:  Social History    Tobacco Use      Smoking status: Former Smoker        Packs/day: 1.50        Years: 16.00        Pack years: 24        Types: Cigarettes, Cigars        Quit date: 12/6/2016        Years since quitting: 3.0      Smokeless tobacco

## 2019-12-25 ENCOUNTER — HOSPITAL ENCOUNTER (INPATIENT)
Facility: HOSPITAL | Age: 54
LOS: 2 days | Discharge: HOME | DRG: 389 | End: 2019-12-28
Attending: EMERGENCY MEDICINE | Admitting: HOSPITALIST
Payer: MEDICARE

## 2019-12-25 ENCOUNTER — APPOINTMENT (OUTPATIENT)
Dept: CT IMAGING | Facility: HOSPITAL | Age: 54
DRG: 389 | End: 2019-12-25
Attending: EMERGENCY MEDICINE
Payer: MEDICARE

## 2019-12-25 DIAGNOSIS — C78.6 PSEUDOMYXOMA PERITONEI (HCC): ICD-10-CM

## 2019-12-25 DIAGNOSIS — K56.609 SBO (SMALL BOWEL OBSTRUCTION) (HCC): Primary | ICD-10-CM

## 2019-12-25 DIAGNOSIS — K52.9 ENTERITIS: ICD-10-CM

## 2019-12-25 LAB
ALBUMIN SERPL-MCNC: 3.5 G/DL (ref 3.4–5)
ALBUMIN/GLOB SERPL: 0.8 {RATIO} (ref 1–2)
ALP LIVER SERPL-CCNC: 86 U/L (ref 45–117)
ALT SERPL-CCNC: 14 U/L (ref 16–61)
ANION GAP SERPL CALC-SCNC: 6 MMOL/L (ref 0–18)
AST SERPL-CCNC: 11 U/L (ref 15–37)
BASOPHILS # BLD AUTO: 0.06 X10(3) UL (ref 0–0.2)
BASOPHILS NFR BLD AUTO: 0.4 %
BILIRUB SERPL-MCNC: 0.4 MG/DL (ref 0.1–2)
BUN BLD-MCNC: 12 MG/DL (ref 7–18)
BUN/CREAT SERPL: 10.2 (ref 10–20)
CALCIUM BLD-MCNC: 10.2 MG/DL (ref 8.5–10.1)
CHLORIDE SERPL-SCNC: 106 MMOL/L (ref 98–112)
CO2 SERPL-SCNC: 27 MMOL/L (ref 21–32)
CREAT BLD-MCNC: 1.18 MG/DL (ref 0.7–1.3)
DEPRECATED RDW RBC AUTO: 42.6 FL (ref 35.1–46.3)
EOSINOPHIL # BLD AUTO: 0.51 X10(3) UL (ref 0–0.7)
EOSINOPHIL NFR BLD AUTO: 3.2 %
ERYTHROCYTE [DISTWIDTH] IN BLOOD BY AUTOMATED COUNT: 12.2 % (ref 11–15)
GLOBULIN PLAS-MCNC: 4.4 G/DL (ref 2.8–4.4)
GLUCOSE BLD-MCNC: 129 MG/DL (ref 70–99)
HCT VFR BLD AUTO: 40.8 % (ref 39–53)
HGB BLD-MCNC: 13.8 G/DL (ref 13–17.5)
IMM GRANULOCYTES # BLD AUTO: 0.05 X10(3) UL (ref 0–1)
IMM GRANULOCYTES NFR BLD: 0.3 %
LIPASE SERPL-CCNC: 74 U/L (ref 73–393)
LYMPHOCYTES # BLD AUTO: 2.01 X10(3) UL (ref 1–4)
LYMPHOCYTES NFR BLD AUTO: 12.7 %
M PROTEIN MFR SERPL ELPH: 7.9 G/DL (ref 6.4–8.2)
MCH RBC QN AUTO: 31.9 PG (ref 26–34)
MCHC RBC AUTO-ENTMCNC: 33.8 G/DL (ref 31–37)
MCV RBC AUTO: 94.4 FL (ref 80–100)
MONOCYTES # BLD AUTO: 1.09 X10(3) UL (ref 0.1–1)
MONOCYTES NFR BLD AUTO: 6.9 %
NEUTROPHILS # BLD AUTO: 12.13 X10 (3) UL (ref 1.5–7.7)
NEUTROPHILS # BLD AUTO: 12.13 X10(3) UL (ref 1.5–7.7)
NEUTROPHILS NFR BLD AUTO: 76.5 %
OSMOLALITY SERPL CALC.SUM OF ELEC: 289 MOSM/KG (ref 275–295)
PLATELET # BLD AUTO: 362 10(3)UL (ref 150–450)
POTASSIUM SERPL-SCNC: 3.9 MMOL/L (ref 3.5–5.1)
RBC # BLD AUTO: 4.32 X10(6)UL (ref 4.3–5.7)
SODIUM SERPL-SCNC: 139 MMOL/L (ref 136–145)
WBC # BLD AUTO: 15.9 X10(3) UL (ref 4–11)

## 2019-12-25 PROCEDURE — 74177 CT ABD & PELVIS W/CONTRAST: CPT | Performed by: EMERGENCY MEDICINE

## 2019-12-25 RX ORDER — HYDROMORPHONE HYDROCHLORIDE 1 MG/ML
0.5 INJECTION, SOLUTION INTRAMUSCULAR; INTRAVENOUS; SUBCUTANEOUS ONCE
Status: COMPLETED | OUTPATIENT
Start: 2019-12-25 | End: 2019-12-25

## 2019-12-25 RX ORDER — ONDANSETRON 2 MG/ML
4 INJECTION INTRAMUSCULAR; INTRAVENOUS ONCE
Status: COMPLETED | OUTPATIENT
Start: 2019-12-25 | End: 2019-12-25

## 2019-12-26 ENCOUNTER — APPOINTMENT (OUTPATIENT)
Dept: GENERAL RADIOLOGY | Facility: HOSPITAL | Age: 54
DRG: 389 | End: 2019-12-26
Attending: EMERGENCY MEDICINE
Payer: MEDICARE

## 2019-12-26 PROBLEM — K56.609 SBO (SMALL BOWEL OBSTRUCTION) (HCC): Status: ACTIVE | Noted: 2019-12-26

## 2019-12-26 PROCEDURE — 99222 1ST HOSP IP/OBS MODERATE 55: CPT | Performed by: HOSPITALIST

## 2019-12-26 RX ORDER — HYDROMORPHONE HYDROCHLORIDE 1 MG/ML
0.8 INJECTION, SOLUTION INTRAMUSCULAR; INTRAVENOUS; SUBCUTANEOUS EVERY 2 HOUR PRN
Status: DISCONTINUED | OUTPATIENT
Start: 2019-12-26 | End: 2019-12-28

## 2019-12-26 RX ORDER — CIPROFLOXACIN 2 MG/ML
400 INJECTION, SOLUTION INTRAVENOUS EVERY 12 HOURS
Status: DISCONTINUED | OUTPATIENT
Start: 2019-12-26 | End: 2019-12-27

## 2019-12-26 RX ORDER — ONDANSETRON 2 MG/ML
4 INJECTION INTRAMUSCULAR; INTRAVENOUS EVERY 4 HOURS PRN
Status: DISCONTINUED | OUTPATIENT
Start: 2019-12-26 | End: 2019-12-28

## 2019-12-26 RX ORDER — HYDROMORPHONE HYDROCHLORIDE 1 MG/ML
0.5 INJECTION, SOLUTION INTRAMUSCULAR; INTRAVENOUS; SUBCUTANEOUS EVERY 30 MIN PRN
Status: DISPENSED | OUTPATIENT
Start: 2019-12-26 | End: 2019-12-26

## 2019-12-26 RX ORDER — ACETAMINOPHEN 10 MG/ML
1000 INJECTION, SOLUTION INTRAVENOUS EVERY 6 HOURS PRN
Status: DISCONTINUED | OUTPATIENT
Start: 2019-12-26 | End: 2019-12-28

## 2019-12-26 RX ORDER — KETOROLAC TROMETHAMINE 15 MG/ML
15 INJECTION, SOLUTION INTRAMUSCULAR; INTRAVENOUS EVERY 6 HOURS PRN
Status: DISCONTINUED | OUTPATIENT
Start: 2019-12-26 | End: 2019-12-28

## 2019-12-26 RX ORDER — HYDROMORPHONE HYDROCHLORIDE 1 MG/ML
0.5 INJECTION, SOLUTION INTRAMUSCULAR; INTRAVENOUS; SUBCUTANEOUS EVERY 30 MIN PRN
Status: DISCONTINUED | OUTPATIENT
Start: 2019-12-26 | End: 2019-12-26

## 2019-12-26 RX ORDER — SODIUM CHLORIDE 9 MG/ML
INJECTION, SOLUTION INTRAVENOUS CONTINUOUS
Status: ACTIVE | OUTPATIENT
Start: 2019-12-26 | End: 2019-12-26

## 2019-12-26 RX ORDER — METRONIDAZOLE 500 MG/100ML
500 INJECTION, SOLUTION INTRAVENOUS EVERY 8 HOURS
Status: DISCONTINUED | OUTPATIENT
Start: 2019-12-26 | End: 2019-12-27

## 2019-12-26 RX ORDER — SODIUM CHLORIDE 9 MG/ML
INJECTION, SOLUTION INTRAVENOUS CONTINUOUS
Status: DISCONTINUED | OUTPATIENT
Start: 2019-12-26 | End: 2019-12-27

## 2019-12-26 RX ORDER — HYDROMORPHONE HYDROCHLORIDE 1 MG/ML
0.2 INJECTION, SOLUTION INTRAMUSCULAR; INTRAVENOUS; SUBCUTANEOUS EVERY 2 HOUR PRN
Status: DISCONTINUED | OUTPATIENT
Start: 2019-12-26 | End: 2019-12-28

## 2019-12-26 RX ORDER — HYDROMORPHONE HYDROCHLORIDE 1 MG/ML
0.4 INJECTION, SOLUTION INTRAMUSCULAR; INTRAVENOUS; SUBCUTANEOUS EVERY 2 HOUR PRN
Status: DISCONTINUED | OUTPATIENT
Start: 2019-12-26 | End: 2019-12-28

## 2019-12-26 NOTE — PLAN OF CARE
NURSING ADMISSION NOTE      Patient admitted via Cart  Oriented to room. Safety precautions initiated. Bed in low position. Call light in reach.     Received pt around 0300  A/OX4, RA, VSS, SR per Tele  States abd pain is tolerable without movement

## 2019-12-26 NOTE — ED INITIAL ASSESSMENT (HPI)
Pt here due to ABD pain. Pt has ABD cancer and had surgery on Nov 19 and has these bouts of Pain. This one has been going on since 1330 today and it is just not going away.

## 2019-12-26 NOTE — CONSULTS
[facility] Surgical Oncology    Patient Name:  Jie Russell   YOB: 1965   Gender:  Male   Appt Date:  12/26/2019   Provider:  Joseph Shanks MD   Insurance:  MEDICARE PART A&B     Christen Meredith MD 11/19/2015: MRI--->Stable serosal T2 hyperintense lesion on the medial aspect of right lobe of the liver measuring 20 x 20 mm. This lesion demonstrates restricted diffusion and rim enhancement.  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  Small lesions along the right lobe of the liver are stable.   7/10/18: CEA: 3.8, CA 19-9: oxyCODONE HCl ER 40 MG Oral Tablet Extended Release 12 hour Abuse-Deterrent, Take 1 tablet (40 mg total) by mouth 2 (two) times daily. , Disp: 30 tablet, Rfl: 0  acetaminophen 500 MG Oral Tab, Take 1,000 mg by mouth every 6 (six) hours as needed for Pain. , polyp; repeat 5 yrs (hyperplastic but hx of adenoma)   • Colonoscopy,biopsy N/A 1/25/2016    Procedure: COLONOSCOPY, POSSIBLE BIOPSY, POSSIBLE POLYPECTOMY 16745;  Surgeon: Isiah Worrell MD;  Location: [address] [place] [address]   • Colonoscopy,jayden le Procedure: LUMBAR EPIDURAL;  Surgeon: Kim Florentino MD;  Location: [facility]   • Injection, w/wo contrast, dx/therapeutic substance, epidural/subarachnoid; lumbar/sacral  4/29/2014    Procedure: LUMBAR EPIDURAL;  Surgeon: Phill Evans Procedure: LUMBAR EPIDURAL;  Surgeon: Munira Lott MD;  Location: [address] [place] [address]   • Patient documented not to have experienced any of the following events  4/29/2014    Procedure: LUMBAR EPIDURAL;  Surgeon: Munira Lott MD;  Mary Crane Procedure: ESOPHAGOGASTRODUODENOSCOPY, COLONOSCOPY, POSSIBLE BIOPSY, POSSIBLE POLYPECTOMY 97492,98261;  Surgeon: Naeem Shah MD;  Location: [address] [place] [address] Social History:  Social History    Tobacco Use      Smoking status: HGB  13.0 - 17.5 g/dL 13.8    HCT  39.0 - 53.0 % 40.8    PLT  150.0 - 450.0 10(3)uL 362.0    MCV  80.0 - 100.0 fL 94.4    MCH  26.0 - 34.0 pg 31.9    MCHC  31.0 - 37.0 g/dL 33.8    RDW  11.0 - 15.0 % 12.2    RDW-SD  35.1 - 46.3 fL 42.6    Neutrophil Absolu

## 2019-12-26 NOTE — ED NOTES
Attempted to place ng tube to r nare with no success pt unable to tolerate procedure due o gagging md notified order given by md to hold off on ng tube at this time

## 2019-12-26 NOTE — H&P
[place] [facility]  History and Physical     Gilberto Arellano Patient Status:  Emergency    1965 MRN [id number]   Location [facility] [facility] Attending Curtis Edgar MD   Hosp Day # 0 PCP Rosita Jay MD     Chief Complaint: Chris Chirinos POLYPECTOMY [id number], [id number] N/A 11/5/2012    Performed by Linnea Short MD at [address] [place] [address]   • ESWL LITHOTRIPSY WITH CYSTOSCOPY, STENT PLACEMENT Left 11/20/2012    Performed by Carmen Mariano MD at [address] [place] [address] cigarettes and cigars. He has a 24.00 pack-year smoking history. He has never used smokeless tobacco. He reports that he does not drink alcohol or use drugs.     Family History:   Family History   Problem Relation Age of Onset   • Cancer Maternal Grandmothe (1.854 m)   Wt 235 lb (106.6 kg)   SpO2 92%   BMI 31.00 kg/m²   General: No acute distress. Alert and oriented x 3. HEENT: Normocephalic atraumatic. Moist mucous membranes. EOM-I. PERRLA. Anicteric. Neck: No lymphadenopathy. No JVD. No carotid bruits.   R

## 2019-12-26 NOTE — ED PROVIDER NOTES
Patient Seen in: [facility] Emergency Department      History   Patient presents with:  Postop/Procedure Problem    Stated Complaint: post op pain    HPI    50-year-old male with a history of pseudomyxoma peritonei, status post bowel resection, hist REMOVAL VIA ILEAL CONDUIT     • LITHOTRIPSY  11/20/12   • LUMBAR EPIDURAL N/A 4/29/2014    Performed by Christopher Riggins MD at [address] [facility] 4/15/2014    Performed by Christopher Riggins MD at [address] [address] drinks    Drug use: No             Review of Systems    Positive for stated complaint: post op pain  Other systems are as noted in HPI. Constitutional and vital signs reviewed. All other systems reviewed and negative except as noted above.     Physica Prelim 12.13 (*)     Neutrophil Absolute 12.13 (*)     Monocyte Absolute 1.09 (*)     All other components within normal limits   LIPASE - Normal   CBC WITH DIFFERENTIAL WITH PLATELET    Narrative:      The following orders were created for panel order CBC who is a history of pseudomyxoma peritonei status post bowel resection who presents to the emergency department with complaints of abdominal pain and nausea. CT scan shows evidence of a small bowel obstruction. Patient will be kept n.p.o.   He will be adm

## 2019-12-27 LAB
ALBUMIN SERPL-MCNC: 3 G/DL (ref 3.4–5)
ALBUMIN/GLOB SERPL: 0.7 {RATIO} (ref 1–2)
ALP LIVER SERPL-CCNC: 76 U/L (ref 45–117)
ALT SERPL-CCNC: 15 U/L (ref 16–61)
ANION GAP SERPL CALC-SCNC: 5 MMOL/L (ref 0–18)
AST SERPL-CCNC: 10 U/L (ref 15–37)
BILIRUB SERPL-MCNC: 0.4 MG/DL (ref 0.1–2)
BUN BLD-MCNC: 10 MG/DL (ref 7–18)
BUN/CREAT SERPL: 9.6 (ref 10–20)
C DIFF TOX B STL QL: NEGATIVE
CALCIUM BLD-MCNC: 9.3 MG/DL (ref 8.5–10.1)
CHLORIDE SERPL-SCNC: 111 MMOL/L (ref 98–112)
CO2 SERPL-SCNC: 28 MMOL/L (ref 21–32)
CREAT BLD-MCNC: 1.04 MG/DL (ref 0.7–1.3)
DEPRECATED RDW RBC AUTO: 43.6 FL (ref 35.1–46.3)
ERYTHROCYTE [DISTWIDTH] IN BLOOD BY AUTOMATED COUNT: 12.3 % (ref 11–15)
GLOBULIN PLAS-MCNC: 4.4 G/DL (ref 2.8–4.4)
GLUCOSE BLD-MCNC: 121 MG/DL (ref 70–99)
HCT VFR BLD AUTO: 38.3 % (ref 39–53)
HGB BLD-MCNC: 12.5 G/DL (ref 13–17.5)
M PROTEIN MFR SERPL ELPH: 7.4 G/DL (ref 6.4–8.2)
MCH RBC QN AUTO: 31.9 PG (ref 26–34)
MCHC RBC AUTO-ENTMCNC: 32.6 G/DL (ref 31–37)
MCV RBC AUTO: 97.7 FL (ref 80–100)
OSMOLALITY SERPL CALC.SUM OF ELEC: 298 MOSM/KG (ref 275–295)
PLATELET # BLD AUTO: 325 10(3)UL (ref 150–450)
POTASSIUM SERPL-SCNC: 4 MMOL/L (ref 3.5–5.1)
RBC # BLD AUTO: 3.92 X10(6)UL (ref 4.3–5.7)
SODIUM SERPL-SCNC: 144 MMOL/L (ref 136–145)
WBC # BLD AUTO: 11.8 X10(3) UL (ref 4–11)

## 2019-12-27 PROCEDURE — 99232 SBSQ HOSP IP/OBS MODERATE 35: CPT | Performed by: INTERNAL MEDICINE

## 2019-12-27 RX ORDER — ENOXAPARIN SODIUM 100 MG/ML
40 INJECTION SUBCUTANEOUS DAILY
Status: DISCONTINUED | OUTPATIENT
Start: 2019-12-27 | End: 2019-12-28

## 2019-12-27 RX ORDER — METRONIDAZOLE 500 MG/1
500 TABLET ORAL EVERY 8 HOURS SCHEDULED
Status: DISCONTINUED | OUTPATIENT
Start: 2019-12-27 | End: 2019-12-28

## 2019-12-27 RX ORDER — CIPROFLOXACIN 500 MG/1
500 TABLET, FILM COATED ORAL EVERY 12 HOURS SCHEDULED
Status: DISCONTINUED | OUTPATIENT
Start: 2019-12-27 | End: 2019-12-28

## 2019-12-27 RX ORDER — LOPERAMIDE HYDROCHLORIDE 2 MG/1
2 CAPSULE ORAL 3 TIMES DAILY PRN
Status: DISCONTINUED | OUTPATIENT
Start: 2019-12-27 | End: 2019-12-28

## 2019-12-27 NOTE — PROGRESS NOTES
[facility]    Progress Note    Jimena Arellano Patient Status:  Inpatient    1965 MRN [id number]   [facility] 7NE-A Attending Bibi Ritter, DO   Hosp Day # 1 PCP Ryan Sahni MD     Subjective:   Sandip Zayas is a [address]  [address] [place] [address],  [facility], [address] [place] [address]  T: [phone]  F: [phone]  Pager [phone]          1:26 PM

## 2019-12-27 NOTE — PLAN OF CARE
A/Ox4, on RA, NSR per tele. C/o intermittent cramping abd pain. Dilaudid and toradol given. Clear liquid diet maintained. x2 BMs this shift. Adequate urine output. Old midline incision with small dehiscence. Bowel sounds active.  Denies nausea/vomit

## 2019-12-27 NOTE — PROGRESS NOTES
[facility]  Progress Note     Griselda Zepead Patient Status:  Inpatient    1965 MRN [id number]   [facility] 7[facility]-A Attending Pramod Cueva, DO   Hosp Day # 1 PCP Claudio Ibarra MD     Chief Complaint: Abdominal pain    S: P Ciprofloxacin HCl  500 mg Oral 2 times per day   • metRONIDAZOLE  500 mg Oral Q8H [facility]   • enoxaparin  40 mg Subcutaneous Daily       ASSESSMENT / PLAN:     1. Small Bowel Obstruction  2. LORE - resolved  3.  Pseudomyxoma peritonei s/p cytoreductive surgery o

## 2019-12-27 NOTE — PLAN OF CARE
A/OX4, RA, VSS, SR per Tele  Abd pain managed w/ PRN toradol and dilaudid  IVF infusing, clear liq diet   Pt ambulating halls   Pt denies nausea, no vomiting, reports passing gas  Needs attended to, Will cont to monitor

## 2019-12-28 VITALS
DIASTOLIC BLOOD PRESSURE: 79 MMHG | SYSTOLIC BLOOD PRESSURE: 138 MMHG | RESPIRATION RATE: 25 BRPM | WEIGHT: 233.5 LBS | BODY MASS INDEX: 30.95 KG/M2 | OXYGEN SATURATION: 95 % | HEIGHT: 73 IN | TEMPERATURE: 98 F | HEART RATE: 89 BPM

## 2019-12-28 LAB
ANION GAP SERPL CALC-SCNC: 6 MMOL/L (ref 0–18)
BASOPHILS # BLD AUTO: 0.03 X10(3) UL (ref 0–0.2)
BASOPHILS NFR BLD AUTO: 0.3 %
BUN BLD-MCNC: 9 MG/DL (ref 7–18)
BUN/CREAT SERPL: 7.4 (ref 10–20)
CALCIUM BLD-MCNC: 9.9 MG/DL (ref 8.5–10.1)
CHLORIDE SERPL-SCNC: 110 MMOL/L (ref 98–112)
CO2 SERPL-SCNC: 27 MMOL/L (ref 21–32)
CREAT BLD-MCNC: 1.22 MG/DL (ref 0.7–1.3)
DEPRECATED RDW RBC AUTO: 43.3 FL (ref 35.1–46.3)
EOSINOPHIL # BLD AUTO: 0.39 X10(3) UL (ref 0–0.7)
EOSINOPHIL NFR BLD AUTO: 3.3 %
ERYTHROCYTE [DISTWIDTH] IN BLOOD BY AUTOMATED COUNT: 12.5 % (ref 11–15)
GLUCOSE BLD-MCNC: 116 MG/DL (ref 70–99)
HAV IGM SER QL: 2.1 MG/DL (ref 1.6–2.6)
HCT VFR BLD AUTO: 38.1 % (ref 39–53)
HGB BLD-MCNC: 13 G/DL (ref 13–17.5)
IMM GRANULOCYTES # BLD AUTO: 0.03 X10(3) UL (ref 0–1)
IMM GRANULOCYTES NFR BLD: 0.3 %
LYMPHOCYTES # BLD AUTO: 1.56 X10(3) UL (ref 1–4)
LYMPHOCYTES NFR BLD AUTO: 13 %
MCH RBC QN AUTO: 32.3 PG (ref 26–34)
MCHC RBC AUTO-ENTMCNC: 34.1 G/DL (ref 31–37)
MCV RBC AUTO: 94.5 FL (ref 80–100)
MONOCYTES # BLD AUTO: 0.87 X10(3) UL (ref 0.1–1)
MONOCYTES NFR BLD AUTO: 7.3 %
NEUTROPHILS # BLD AUTO: 9.12 X10 (3) UL (ref 1.5–7.7)
NEUTROPHILS # BLD AUTO: 9.12 X10(3) UL (ref 1.5–7.7)
NEUTROPHILS NFR BLD AUTO: 75.8 %
OSMOLALITY SERPL CALC.SUM OF ELEC: 296 MOSM/KG (ref 275–295)
PLATELET # BLD AUTO: 354 10(3)UL (ref 150–450)
POTASSIUM SERPL-SCNC: 3.9 MMOL/L (ref 3.5–5.1)
RBC # BLD AUTO: 4.03 X10(6)UL (ref 4.3–5.7)
SODIUM SERPL-SCNC: 143 MMOL/L (ref 136–145)
WBC # BLD AUTO: 12 X10(3) UL (ref 4–11)

## 2019-12-28 PROCEDURE — 99239 HOSP IP/OBS DSCHRG MGMT >30: CPT | Performed by: INTERNAL MEDICINE

## 2019-12-28 RX ORDER — LOPERAMIDE HYDROCHLORIDE 2 MG/1
2 CAPSULE ORAL 3 TIMES DAILY PRN
Qty: 30 CAPSULE | Refills: 0 | Status: SHIPPED | OUTPATIENT
Start: 2019-12-28 | End: 2020-07-01 | Stop reason: ALTCHOICE

## 2019-12-28 RX ORDER — CIPROFLOXACIN 500 MG/1
500 TABLET, FILM COATED ORAL EVERY 12 HOURS SCHEDULED
Qty: 14 TABLET | Refills: 0 | Status: SHIPPED | OUTPATIENT
Start: 2019-12-28 | End: 2020-01-04

## 2019-12-28 RX ORDER — METRONIDAZOLE 500 MG/1
500 TABLET ORAL EVERY 8 HOURS SCHEDULED
Qty: 21 TABLET | Refills: 0 | Status: SHIPPED | OUTPATIENT
Start: 2019-12-28 | End: 2020-01-04

## 2019-12-28 NOTE — PLAN OF CARE
Assumed pt care at 0730  VSS  Pt up ad annabelle  Ambulating hallway frequently and tolerating well  Pt reports abdominal cramping but declines pharmacological intervention   Imodium given PRN for frequent stools  C.diff culture negative  Pancreatic elastase pen

## 2019-12-28 NOTE — PROGRESS NOTES
[facility]    Progress Note    Jimena Arellano Patient Status:  Inpatient    1965 MRN [id number]   [facility] 7NE-A Attending Bibi Ritter, DO   Hosp Day # 2 PCP Ryan Sahni MD     Subjective:   Sandip Zayas is a 53

## 2019-12-28 NOTE — PLAN OF CARE
Assumed patient care at 0730.  VSS   Tolerating soft diet   BM early this am soft per patient   Denies and pain, N/V     Ok for dc from all services   DC meds and follow up instructions reviewed   IV removed, tele removed   Patient declines wheelchair for t

## 2019-12-28 NOTE — DISCHARGE SUMMARY
[facility]  Discharge Summary    Janet Arellano Patient Status:  Inpatient    1965 MRN [id number]   [facility] 7NE-A Attending Alyce Villareal MD   [facility] Day # 2 PCP Pradeep Meehan MD     Date of Admission: 2019    Date o oncologist.  Follow-up with regular outpatient primary care patient within 1 week in office and surgical oncologist as directed.       TCM Diagnosis at discharge from Hospital: Other: see above; still recommend for TCM follow-up    Lace+ Score: 65  59-90 Hi Crea  Commonly known as:  EMLA      APPLY 4 GRAMS (4 PUMPS) TO THE AFFECTED AREA(S) THREE TO FOUR TIMES DAILY FOR THE TREATMENT OF PAIN   Refills:  11     oxyCODONE HCl ER 40 MG T12a  Commonly known as:  OXYCONTIN      Take 1 tablet (40 mg total) by mouth lesions  Neurologic: Awake,alert,nonfocal  Psych: Mood and affect appears normal      Discharge Condition: stable    Patient Discharge Instructions: See electronic chart      More than 30 minutes on discharge    Sunitha Chandler MD  12/28/2019  8:50 AM

## 2019-12-28 NOTE — PROGRESS NOTES
[facility]  Progress Note     Sravani Block Patient Status:  Inpatient    1965 MRN [id number]   [facility] 7NE-A Attending Nini Burris, DO   Hosp Day # 2 PCP Tammi Guzman MD     Chief Complaint: Abdominal pain    S: AST 11* 10*   ALT 14* 15*   BILT 0.4 0.4   TP 7.9 7.4       Estimated Creatinine Clearance: 90.7 mL/min (based on SCr of 1.04 mg/dL). No results for input(s): PTP, INR in the last 168 hours. No results for input(s): TROP, CK in the last 168 hours.

## 2019-12-28 NOTE — PLAN OF CARE
Assumed pt care at [address] [place] [address] for the night shift. Pt sitting up in the chair eating dinner. Denies any n/v. Abd soft,non distended. BS hyperactive. Tylenol IV given for abd discomfort 4/10. Pt reports diarrhea improved after Imodium.  VSS. Afebrile. Cont po abx as interventions unsuccessful or patient reports new pain  - Anticipate increased pain with activity and pre-medicate as appropriate  Outcome: Progressing

## 2019-12-29 LAB — PANCREATIC ELASTASE , FECAL: 273 UG/G

## 2020-01-20 RX ORDER — OXYCODONE HCL 40 MG/1
40 TABLET, FILM COATED, EXTENDED RELEASE ORAL EVERY 12 HOURS SCHEDULED
Qty: 60 TABLET | Refills: 0 | Status: SHIPPED | OUTPATIENT
Start: 2020-01-20 | End: 2020-02-21 | Stop reason: SDUPTHER

## 2020-02-21 ENCOUNTER — TELEPHONE (OUTPATIENT)
Dept: SCHEDULING | Age: 55
End: 2020-02-21

## 2020-02-21 RX ORDER — OXYCODONE HCL 40 MG/1
40 TABLET, FILM COATED, EXTENDED RELEASE ORAL EVERY 12 HOURS SCHEDULED
Qty: 60 TABLET | Refills: 0 | Status: SHIPPED | OUTPATIENT
Start: 2020-02-21 | End: 2020-03-20 | Stop reason: SDUPTHER

## 2020-03-20 RX ORDER — OXYCODONE HCL 40 MG/1
40 TABLET, FILM COATED, EXTENDED RELEASE ORAL EVERY 12 HOURS SCHEDULED
Qty: 60 TABLET | Refills: 0 | Status: SHIPPED | OUTPATIENT
Start: 2020-03-20 | End: 2020-04-20 | Stop reason: SDUPTHER

## 2020-03-24 ENCOUNTER — TELEPHONE (OUTPATIENT)
Dept: SURGERY | Facility: CLINIC | Age: 55
End: 2020-03-24

## 2020-03-24 NOTE — TELEPHONE ENCOUNTER
Call from pt stating he was supposed to follow up with Dr. Kristal Horner in April with a CT scan and labs. Pt states he is doing well and due to the coronavirus outbreak if it is okay to postpone the follow up.  Advised pt it is okay to postpone his follow up and t

## 2020-04-20 RX ORDER — OXYCODONE HCL 40 MG/1
40 TABLET, FILM COATED, EXTENDED RELEASE ORAL EVERY 12 HOURS SCHEDULED
Qty: 60 TABLET | Refills: 0 | Status: SHIPPED | OUTPATIENT
Start: 2020-04-20 | End: 2020-05-15 | Stop reason: SDUPTHER

## 2020-05-15 RX ORDER — OXYCODONE HCL 40 MG/1
40 TABLET, FILM COATED, EXTENDED RELEASE ORAL EVERY 12 HOURS SCHEDULED
Qty: 60 TABLET | Refills: 0 | Status: SHIPPED | OUTPATIENT
Start: 2020-05-15 | End: 2020-05-18 | Stop reason: SDUPTHER

## 2020-05-19 RX ORDER — OXYCODONE HCL 40 MG/1
40 TABLET, FILM COATED, EXTENDED RELEASE ORAL EVERY 12 HOURS SCHEDULED
Qty: 60 TABLET | Refills: 0 | Status: SHIPPED | OUTPATIENT
Start: 2020-05-19 | End: 2020-06-18 | Stop reason: SDUPTHER

## 2020-05-21 ENCOUNTER — TELEPHONE (OUTPATIENT)
Dept: SCHEDULING | Age: 55
End: 2020-05-21

## 2020-06-18 RX ORDER — OXYCODONE HCL 40 MG/1
40 TABLET, FILM COATED, EXTENDED RELEASE ORAL EVERY 12 HOURS SCHEDULED
Qty: 60 TABLET | Refills: 0 | Status: SHIPPED | OUTPATIENT
Start: 2020-06-18 | End: 2020-07-15 | Stop reason: SDUPTHER

## 2020-06-22 ENCOUNTER — HOSPITAL ENCOUNTER (OUTPATIENT)
Dept: CT IMAGING | Facility: HOSPITAL | Age: 55
Discharge: HOME | End: 2020-06-22
Attending: SURGERY
Payer: MEDICARE

## 2020-06-22 ENCOUNTER — LAB ENCOUNTER (OUTPATIENT)
Dept: LAB | Facility: HOSPITAL | Age: 55
End: 2020-06-22
Attending: SURGERY
Payer: MEDICARE

## 2020-06-22 DIAGNOSIS — Z87.39 HISTORY OF GOUT: ICD-10-CM

## 2020-06-22 DIAGNOSIS — C78.6 PSEUDOMYXOMA PERITONEI (HCC): ICD-10-CM

## 2020-06-22 DIAGNOSIS — M1A.09X0 CHRONIC GOUT OF MULTIPLE SITES, UNSPECIFIED CAUSE: ICD-10-CM

## 2020-06-22 PROCEDURE — 82378 CARCINOEMBRYONIC ANTIGEN: CPT

## 2020-06-22 PROCEDURE — 74177 CT ABD & PELVIS W/CONTRAST: CPT | Performed by: SURGERY

## 2020-06-22 PROCEDURE — 86304 IMMUNOASSAY TUMOR CA 125: CPT

## 2020-06-22 PROCEDURE — 85025 COMPLETE CBC W/AUTO DIFF WBC: CPT

## 2020-06-22 PROCEDURE — 84550 ASSAY OF BLOOD/URIC ACID: CPT

## 2020-06-22 PROCEDURE — 86301 IMMUNOASSAY TUMOR CA 19-9: CPT

## 2020-06-22 PROCEDURE — 36415 COLL VENOUS BLD VENIPUNCTURE: CPT

## 2020-06-22 PROCEDURE — 80053 COMPREHEN METABOLIC PANEL: CPT

## 2020-06-22 NOTE — PROGRESS NOTES
Call pt  Kidney function for him stable; a bit sluggish but stable for him Y . Please make sure you are keeping well hydrated    Uric acid at goal   Improved /lower wbc at 11.2 ; will follow trends.

## 2020-06-29 ENCOUNTER — TELEPHONE (OUTPATIENT)
Dept: SURGERY | Facility: CLINIC | Age: 55
End: 2020-06-29

## 2020-07-01 ENCOUNTER — OFFICE VISIT (OUTPATIENT)
Dept: SURGERY | Facility: CLINIC | Age: 55
End: 2020-07-01
Payer: MEDICARE

## 2020-07-01 VITALS
OXYGEN SATURATION: 96 % | DIASTOLIC BLOOD PRESSURE: 74 MMHG | SYSTOLIC BLOOD PRESSURE: 116 MMHG | WEIGHT: 239 LBS | RESPIRATION RATE: 16 BRPM | TEMPERATURE: 98 F | HEART RATE: 88 BPM | HEIGHT: 73 IN | BODY MASS INDEX: 31.68 KG/M2

## 2020-07-01 DIAGNOSIS — C78.6 PSEUDOMYXOMA PERITONEI (HCC): Primary | ICD-10-CM

## 2020-07-01 PROCEDURE — 99213 OFFICE O/P EST LOW 20 MIN: CPT | Performed by: SURGERY

## 2020-07-01 RX ORDER — OXYCODONE HCL 40 MG/1
40 TABLET, FILM COATED, EXTENDED RELEASE ORAL
COMMUNITY
Start: 2020-06-18 | End: 2020-07-01 | Stop reason: ALTCHOICE

## 2020-07-01 NOTE — PROGRESS NOTES
[facility]              Patient Name:  Terry Schmidt   YOB: 1965   Gender:  Male   Appt Date:  7/1/2020   Provider:  Agnes Paulino MD   Insurance:  [address] [place] [address]  Primary Care Provider:NINOSKA Several small gastrohepatic and portacaval nodes unchanged or smaller. 5/12/2015: CEA---> 3.6  11/19/2015: MRI--->Stable serosal T2 hyperintense lesion on the medial aspect of right lobe of the liver measuring 20 x 20 mm.  This lesion demonstrates restrict calculi in both kidneys are stable.   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  Small lesions along the right lobe of the liver are Cream, APPLY 4 GRAMS (4 PUMPS) TO THE AFFECTED AREA(S) THREE TO FOUR TIMES DAILY FOR THE TREATMENT OF PAIN, Disp: , Rfl: 11  •  CUSTOM MEDICATION, Diclofenac Sodium 1.5% Solution #300ml (2 bottles)--apply 40 drops (1.3 ml) to each painful area (up to 2 are COLONOSCOPY, POSSIBLE BIOPSY, POSSIBLE POLYPECTOMY 74430,37672;  Surgeon: Seven Novak MD;  Location: [address] [place] [address]   • Cystoscopy,remv calculus,simple  11/20/2012    Procedure: CYSTOSCOPY WITH REMOVAL OF STENT;  Surgeon: Carrillo Sierra, 1/2013     Pseuodmyxoma Peritoneii stage IV s/p DAISY, omenentectomy, right hemicolectomy, and intraperitoneal chemotherapy 01/2013   • Other surgical history  07/22/13    Cystoscopy with Stent Removal - Dr. Brendan Warren   • Other surgical history  11/23/2014    Ex MD JASON;  Location: [address] [place] [address]   • Patient withough preoperative order for iv antibiotic surgical site infection prophylaxis.   11/5/2012    Procedure: ESOPHAGOGASTRODUODENOSCOPY, COLONOSCOPY, POSSIBLE BIOPSY, POSSIBLE POLYPECTOMY 52330,10223; Father    • Diabetes Mother    • Diabetes Brother       Reviewed:       Review of Systems:  GENERAL HEALTH: feels well, no fatigue. SKIN: denies change in mole.    HEENT: denies pain  RESPIRATORY: denies shortness of breath, wheezing or cough   CARDIOVASC Pseudomyxoma peritonei     Doing well with CT scanning revealing improved findings. These were discussed with the patient. We will continue follow-up. He will return to see me 3 months with tumor markers. Anticipate imaging in 6 months.         Follow U

## 2020-07-15 RX ORDER — OXYCODONE HCL 40 MG/1
40 TABLET, FILM COATED, EXTENDED RELEASE ORAL EVERY 12 HOURS SCHEDULED
Qty: 60 TABLET | Refills: 0 | Status: SHIPPED | OUTPATIENT
Start: 2020-07-15 | End: 2020-08-11 | Stop reason: SDUPTHER

## 2020-08-10 ENCOUNTER — TELEPHONE (OUTPATIENT)
Dept: SCHEDULING | Age: 55
End: 2020-08-10

## 2020-08-11 ENCOUNTER — OFFICE VISIT (OUTPATIENT)
Dept: INTERNAL MEDICINE | Age: 55
End: 2020-08-11

## 2020-08-11 ENCOUNTER — TELEPHONE (OUTPATIENT)
Dept: SCHEDULING | Age: 55
End: 2020-08-11

## 2020-08-11 VITALS
OXYGEN SATURATION: 96 % | BODY MASS INDEX: 31.94 KG/M2 | DIASTOLIC BLOOD PRESSURE: 88 MMHG | TEMPERATURE: 97.8 F | SYSTOLIC BLOOD PRESSURE: 116 MMHG | HEIGHT: 73 IN | WEIGHT: 241 LBS | HEART RATE: 79 BPM

## 2020-08-11 DIAGNOSIS — G89.4 CHRONIC PAIN DISORDER: Primary | ICD-10-CM

## 2020-08-11 DIAGNOSIS — M1A.9XX0 CHRONIC GOUT WITHOUT TOPHUS, UNSPECIFIED CAUSE, UNSPECIFIED SITE: ICD-10-CM

## 2020-08-11 DIAGNOSIS — C78.6 PERITONEAL CARCINOMATOSIS (CMD): ICD-10-CM

## 2020-08-11 PROCEDURE — 99214 OFFICE O/P EST MOD 30 MIN: CPT | Performed by: INTERNAL MEDICINE

## 2020-08-11 RX ORDER — OXYCODONE HCL 40 MG/1
40 TABLET, FILM COATED, EXTENDED RELEASE ORAL EVERY 12 HOURS SCHEDULED
Qty: 60 TABLET | Refills: 0 | Status: SHIPPED | OUTPATIENT
Start: 2020-08-17 | End: 2020-09-15 | Stop reason: SDUPTHER

## 2020-08-11 SDOH — HEALTH STABILITY: MENTAL HEALTH: HOW OFTEN DO YOU HAVE A DRINK CONTAINING ALCOHOL?: NEVER

## 2020-08-11 SDOH — HEALTH STABILITY: MENTAL HEALTH
STRESS IS WHEN SOMEONE FEELS TENSE, NERVOUS, ANXIOUS, OR CAN'T SLEEP AT NIGHT BECAUSE THEIR MIND IS TROUBLED. HOW STRESSED ARE YOU?: NOT AT ALL

## 2020-08-11 SDOH — HEALTH STABILITY: PHYSICAL HEALTH: ON AVERAGE, HOW MANY DAYS PER WEEK DO YOU ENGAGE IN MODERATE TO STRENUOUS EXERCISE (LIKE A BRISK WALK)?: 5 DAYS

## 2020-08-11 SDOH — HEALTH STABILITY: PHYSICAL HEALTH: ON AVERAGE, HOW MANY MINUTES DO YOU ENGAGE IN EXERCISE AT THIS LEVEL?: 40 MIN

## 2020-08-11 ASSESSMENT — ACTIVITIES OF DAILY LIVING (ADL)
ADL_BEFORE_ADMISSION: INDEPENDENT
SENSORY_SUPPORT_DEVICES: EYEGLASSES
RECENT_DECLINE_ADL: NO
ADL_SHORT_OF_BREATH: NO
ADL_SCORE: 12
NEEDS_ASSIST: NO

## 2020-08-11 ASSESSMENT — COGNITIVE AND FUNCTIONAL STATUS - GENERAL
ARE YOU DEAF OR DO YOU HAVE SERIOUS DIFFICULTY  HEARING: YES
ARE YOU BLIND OR DO YOU HAVE SERIOUS DIFFICULTY SEEING, EVEN WHEN WEARING GLASSES: NO

## 2020-08-13 ENCOUNTER — TELEPHONE (OUTPATIENT)
Dept: SCHEDULING | Age: 55
End: 2020-08-13

## 2020-08-21 ENCOUNTER — LAB ENCOUNTER (OUTPATIENT)
Dept: LAB | Facility: HOSPITAL | Age: 55
End: 2020-08-21
Attending: INTERNAL MEDICINE
Payer: MEDICARE

## 2020-08-21 DIAGNOSIS — Z86.14 HISTORY OF MRSA INFECTION: ICD-10-CM

## 2020-08-21 PROCEDURE — 87081 CULTURE SCREEN ONLY: CPT

## 2020-09-14 ENCOUNTER — APPOINTMENT (OUTPATIENT)
Dept: LAB | Age: 55
End: 2020-09-14
Attending: INTERNAL MEDICINE
Payer: MEDICARE

## 2020-09-14 DIAGNOSIS — Z01.818 PRE-OP TESTING: ICD-10-CM

## 2020-09-15 RX ORDER — OXYCODONE HCL 40 MG/1
40 TABLET, FILM COATED, EXTENDED RELEASE ORAL EVERY 12 HOURS SCHEDULED
Qty: 60 TABLET | Refills: 0 | Status: SHIPPED | OUTPATIENT
Start: 2020-09-15 | End: 2020-10-15 | Stop reason: SDUPTHER

## 2020-09-17 PROBLEM — Z86.010 PERSONAL HISTORY OF COLONIC POLYPS: Status: ACTIVE | Noted: 2020-09-17

## 2020-09-17 PROBLEM — Z86.0100 PERSONAL HISTORY OF COLONIC POLYPS: Status: ACTIVE | Noted: 2020-09-17

## 2020-09-17 PROBLEM — D12.5 BENIGN NEOPLASM OF SIGMOID COLON: Status: ACTIVE | Noted: 2020-09-17

## 2020-09-17 LAB — SARS-COV-2 RNA RESP QL NAA+PROBE: NOT DETECTED

## 2020-10-06 ENCOUNTER — LAB ENCOUNTER (OUTPATIENT)
Dept: LAB | Facility: HOSPITAL | Age: 55
End: 2020-10-06
Attending: SURGERY
Payer: MEDICARE

## 2020-10-06 ENCOUNTER — TELEPHONE (OUTPATIENT)
Dept: SURGERY | Facility: CLINIC | Age: 55
End: 2020-10-06

## 2020-10-06 DIAGNOSIS — C78.6 PSEUDOMYXOMA PERITONEI (HCC): ICD-10-CM

## 2020-10-06 PROCEDURE — 82378 CARCINOEMBRYONIC ANTIGEN: CPT

## 2020-10-06 PROCEDURE — 86304 IMMUNOASSAY TUMOR CA 125: CPT

## 2020-10-06 PROCEDURE — 36415 COLL VENOUS BLD VENIPUNCTURE: CPT

## 2020-10-06 PROCEDURE — 86301 IMMUNOASSAY TUMOR CA 19-9: CPT

## 2020-10-06 NOTE — TELEPHONE ENCOUNTER
Pt had appt for 10/5 for lab work checked this morning did not see labs done.  Called M asking if pt ever made it to appt or got labs done elsewhere also informing they need to be done prior to his appt tomorrow 10/7

## 2020-10-07 ENCOUNTER — OFFICE VISIT (OUTPATIENT)
Dept: SURGERY | Facility: CLINIC | Age: 55
End: 2020-10-07
Payer: MEDICARE

## 2020-10-07 VITALS
DIASTOLIC BLOOD PRESSURE: 85 MMHG | SYSTOLIC BLOOD PRESSURE: 151 MMHG | OXYGEN SATURATION: 96 % | HEART RATE: 56 BPM | RESPIRATION RATE: 16 BRPM | BODY MASS INDEX: 32 KG/M2 | WEIGHT: 240 LBS

## 2020-10-07 DIAGNOSIS — C17.8 MALIGNANT NEOPLASM OF OVERLAPPING SITES OF SMALL INTESTINE (HCC): ICD-10-CM

## 2020-10-07 DIAGNOSIS — C78.89 SECONDARY MALIGNANT NEOPLASM OF OTHER DIGESTIVE ORGANS (HCC): ICD-10-CM

## 2020-10-07 DIAGNOSIS — C78.6 PSEUDOMYXOMA PERITONEI (HCC): Primary | ICD-10-CM

## 2020-10-07 DIAGNOSIS — C48.2 MALIGNANT NEOPLASM OF PERITONEUM, UNSPECIFIED (HCC): ICD-10-CM

## 2020-10-07 PROCEDURE — 99213 OFFICE O/P EST LOW 20 MIN: CPT | Performed by: SURGERY

## 2020-10-07 NOTE — PROGRESS NOTES
[address] [facility]              Patient Name:  James Mariano   YOB: 1965   Gender:  Male   Appt Date:  7/1/2020   Provider:  Delia Tony MD   Insurance:  MEDICARE PART B ONLY     PATIENT PROVIDERS  Primary Care Provide 5/12/2015: CT--->Index subcapsular focus along posterior margin of right lobe 1 cm by 0.6 cm. Previously measured 1.1 X 0.8 cm. Additional implant along the posterior margin of the liver right lobe 1.5 x 0.9 cm (previously 1.8 x 1.3 cm).  Several small martín 1/5/17: Bilobed low-density lesion adjacent to the greater curvature of the stomach is stable.  Exophytic low-density lesions adjacent to the right hepatic lobe are stable.  Mildly prominent zain hepatis lymph node is stable.  Multiple nonenlarged   retro Medications Reviewed:    Current Outpatient Medications:   •  oxyCODONE HCl ER 40 MG Oral Tablet Extended Release 12 hour Abuse-Deterrent, Take 40 mg by mouth Q12H., Disp: , Rfl:   •  Na Sulfate-K Sulfate-Mg Sulf (SUPREP BOWEL PREP KIT) 17.5-3.13-1.6 GM/17 polyp; repeat 5 yrs (hyperplastic but hx of adenoma)   • Colonoscopy,biopsy N/A 1/25/2016    Procedure: COLONOSCOPY, POSSIBLE BIOPSY, POSSIBLE POLYPECTOMY 10516;  Surgeon: [place] Longo MD;  Location: [address] [place] [address]   • Colonoscopy,jayden le Procedure: LUMBAR EPIDURAL;  Surgeon: Zeyad Rivera MD;  Location: [facility]   • Injection, w/wo contrast, dx/therapeutic substance, epidural/subarachnoid; lumbar/sacral  4/29/2014    Procedure: LUMBAR EPIDURAL;  Surgeon: Hemal Tierney Procedure: LUMBAR EPIDURAL;  Surgeon: Eder Childress MD;  Location: [address] [place] [address]   • Patient documented not to have experienced any of the following events  4/29/2014    Procedure: LUMBAR EPIDURAL;  Surgeon: Eder Childress MD;  [facility] Procedure: ESOPHAGOGASTRODUODENOSCOPY, COLONOSCOPY, POSSIBLE BIOPSY, POSSIBLE POLYPECTOMY 56193,17714;  Surgeon: Giselle Porras MD;  Location: [address] [place] [address] Social History:  Social History    Tobacco Use      Smoking status: 3 months        Electronically Signed by:     Karuna Samuel.  Hussain Marrufo MD FACS

## 2020-10-16 RX ORDER — OXYCODONE HCL 40 MG/1
40 TABLET, FILM COATED, EXTENDED RELEASE ORAL EVERY 12 HOURS SCHEDULED
Qty: 60 TABLET | Refills: 0 | Status: SHIPPED | OUTPATIENT
Start: 2020-10-16 | End: 2020-11-13 | Stop reason: SDUPTHER

## 2020-11-13 RX ORDER — OXYCODONE HCL 40 MG/1
40 TABLET, FILM COATED, EXTENDED RELEASE ORAL EVERY 12 HOURS SCHEDULED
Qty: 60 TABLET | Refills: 0 | Status: SHIPPED | OUTPATIENT
Start: 2020-11-13 | End: 2020-12-11 | Stop reason: SDUPTHER

## 2020-12-11 RX ORDER — OXYCODONE HCL 40 MG/1
40 TABLET, FILM COATED, EXTENDED RELEASE ORAL EVERY 12 HOURS SCHEDULED
Qty: 60 TABLET | Refills: 0 | Status: SHIPPED | OUTPATIENT
Start: 2020-12-11 | End: 2021-01-12 | Stop reason: SDUPTHER

## 2021-01-01 ENCOUNTER — EXTERNAL RECORD (OUTPATIENT)
Dept: HEALTH INFORMATION MANAGEMENT | Facility: OTHER | Age: 56
End: 2021-01-01

## 2021-01-05 ENCOUNTER — TELEPHONE (OUTPATIENT)
Dept: SURGERY | Facility: CLINIC | Age: 56
End: 2021-01-05

## 2021-01-05 NOTE — TELEPHONE ENCOUNTER
Called patient to see if he had imaging and labs done elsewhere for his appt with Dr. Kellogg March tomorrow he stated he did not but he had forgotten about his appt and [place] and would not be able to make it.  We rescheduled for new Wednesday 1/12/21

## 2021-01-09 ENCOUNTER — LAB ENCOUNTER (OUTPATIENT)
Dept: LAB | Facility: HOSPITAL | Age: 56
End: 2021-01-09
Attending: PHYSICIAN ASSISTANT
Payer: MEDICARE

## 2021-01-09 ENCOUNTER — HOSPITAL ENCOUNTER (OUTPATIENT)
Dept: CT IMAGING | Facility: HOSPITAL | Age: 56
Discharge: HOME | End: 2021-01-09
Attending: PHYSICIAN ASSISTANT
Payer: MEDICARE

## 2021-01-09 DIAGNOSIS — C78.6 PSEUDOMYXOMA PERITONEI (HCC): ICD-10-CM

## 2021-01-09 DIAGNOSIS — C78.89 SECONDARY MALIGNANT NEOPLASM OF OTHER DIGESTIVE ORGANS (HCC): ICD-10-CM

## 2021-01-09 DIAGNOSIS — Z79.899 LONG-TERM USE OF HIGH-RISK MEDICATION: ICD-10-CM

## 2021-01-09 DIAGNOSIS — C17.8 MALIGNANT NEOPLASM OF OVERLAPPING SITES OF SMALL INTESTINE (HCC): ICD-10-CM

## 2021-01-09 DIAGNOSIS — C48.2 MALIGNANT NEOPLASM OF PERITONEUM, UNSPECIFIED (HCC): ICD-10-CM

## 2021-01-09 LAB
ALBUMIN SERPL-MCNC: 4.1 G/DL (ref 3.4–5)
ALP LIVER SERPL-CCNC: 95 U/L
ALT SERPL-CCNC: 31 U/L
AST SERPL-CCNC: 21 U/L (ref 15–37)
BASOPHILS # BLD AUTO: 0.08 X10(3) UL (ref 0–0.2)
BASOPHILS NFR BLD AUTO: 0.7 %
BILIRUB DIRECT SERPL-MCNC: 0.2 MG/DL (ref 0–0.2)
BILIRUB SERPL-MCNC: 0.6 MG/DL (ref 0.1–2)
BUN BLD-MCNC: 12 MG/DL (ref 7–18)
CANCER AG125 SERPL-ACNC: 2.6 U/ML (ref ?–35)
CANCER AG19-9 SERPL-ACNC: 8.6 U/ML (ref ?–37)
CEA SERPL-MCNC: 3.4 NG/ML (ref ?–5)
CREAT BLD-MCNC: 1.23 MG/DL
DEPRECATED RDW RBC AUTO: 44.3 FL (ref 35.1–46.3)
EOSINOPHIL # BLD AUTO: 0.31 X10(3) UL (ref 0–0.7)
EOSINOPHIL NFR BLD AUTO: 2.8 %
ERYTHROCYTE [DISTWIDTH] IN BLOOD BY AUTOMATED COUNT: 12.4 % (ref 11–15)
HCT VFR BLD AUTO: 49.4 %
HGB BLD-MCNC: 16.5 G/DL
IMM GRANULOCYTES # BLD AUTO: 0.05 X10(3) UL (ref 0–1)
IMM GRANULOCYTES NFR BLD: 0.4 %
LYMPHOCYTES # BLD AUTO: 2.83 X10(3) UL (ref 1–4)
LYMPHOCYTES NFR BLD AUTO: 25.3 %
M PROTEIN MFR SERPL ELPH: 7.9 G/DL (ref 6.4–8.2)
MCH RBC QN AUTO: 32.1 PG (ref 26–34)
MCHC RBC AUTO-ENTMCNC: 33.4 G/DL (ref 31–37)
MCV RBC AUTO: 96.1 FL
MONOCYTES # BLD AUTO: 1.08 X10(3) UL (ref 0.1–1)
MONOCYTES NFR BLD AUTO: 9.7 %
NEUTROPHILS # BLD AUTO: 6.84 X10 (3) UL (ref 1.5–7.7)
NEUTROPHILS # BLD AUTO: 6.84 X10(3) UL (ref 1.5–7.7)
NEUTROPHILS NFR BLD AUTO: 61.1 %
PLATELET # BLD AUTO: 290 10(3)UL (ref 150–450)
RBC # BLD AUTO: 5.14 X10(6)UL
SED RATE-ML: 9 MM/HR
URATE SERPL-MCNC: 5.5 MG/DL
WBC # BLD AUTO: 11.2 X10(3) UL (ref 4–11)

## 2021-01-09 PROCEDURE — 80076 HEPATIC FUNCTION PANEL: CPT

## 2021-01-09 PROCEDURE — 86301 IMMUNOASSAY TUMOR CA 19-9: CPT

## 2021-01-09 PROCEDURE — 36415 COLL VENOUS BLD VENIPUNCTURE: CPT

## 2021-01-09 PROCEDURE — 85025 COMPLETE CBC W/AUTO DIFF WBC: CPT

## 2021-01-09 PROCEDURE — 82378 CARCINOEMBRYONIC ANTIGEN: CPT

## 2021-01-09 PROCEDURE — 82565 ASSAY OF CREATININE: CPT

## 2021-01-09 PROCEDURE — 86304 IMMUNOASSAY TUMOR CA 125: CPT

## 2021-01-09 PROCEDURE — 84520 ASSAY OF UREA NITROGEN: CPT

## 2021-01-09 PROCEDURE — 74177 CT ABD & PELVIS W/CONTRAST: CPT | Performed by: PHYSICIAN ASSISTANT

## 2021-01-09 PROCEDURE — 85652 RBC SED RATE AUTOMATED: CPT

## 2021-01-09 PROCEDURE — 84550 ASSAY OF BLOOD/URIC ACID: CPT

## 2021-01-12 RX ORDER — OXYCODONE HCL 40 MG/1
40 TABLET, FILM COATED, EXTENDED RELEASE ORAL EVERY 12 HOURS SCHEDULED
Qty: 60 TABLET | Refills: 0 | Status: SHIPPED | OUTPATIENT
Start: 2021-01-12 | End: 2021-02-15 | Stop reason: SDUPTHER

## 2021-01-13 ENCOUNTER — OFFICE VISIT (OUTPATIENT)
Dept: SURGERY | Facility: CLINIC | Age: 56
End: 2021-01-13
Payer: MEDICARE

## 2021-01-13 VITALS
HEART RATE: 72 BPM | OXYGEN SATURATION: 96 % | SYSTOLIC BLOOD PRESSURE: 124 MMHG | DIASTOLIC BLOOD PRESSURE: 88 MMHG | WEIGHT: 241 LBS | BODY MASS INDEX: 32 KG/M2 | RESPIRATION RATE: 16 BRPM | TEMPERATURE: 96 F

## 2021-01-13 DIAGNOSIS — C78.6 PSEUDOMYXOMA PERITONEI (HCC): Primary | ICD-10-CM

## 2021-01-13 DIAGNOSIS — Z85.09 PERSONAL HISTORY OF MALIGNANT NEOPLASM OF OTHER DIGESTIVE ORGANS: ICD-10-CM

## 2021-01-13 DIAGNOSIS — R97.1 ELEVATED CANCER ANTIGEN 125 (CA 125): ICD-10-CM

## 2021-01-13 DIAGNOSIS — D01.49 CARCINOMA IN SITU OF OTHER PARTS OF INTESTINE: ICD-10-CM

## 2021-01-13 PROCEDURE — 99213 OFFICE O/P EST LOW 20 MIN: CPT | Performed by: SURGERY

## 2021-01-13 NOTE — PROGRESS NOTES
[facility]                         Patient Name:  Dimitris Kemp   YOB: 1965   Gender:  Male   Appt Date:  1/13/2021   Provider:  Niranjan Thomas MD   Insurance:  MEDICARE PART B ONLY     PATIENT PROVIDERS  Primary along the posterior margin of the liver right lobe 1.5 x 0.9 cm (previously 1.8 x 1.3 cm). Several small gastrohepatic and portacaval nodes unchanged or smaller.   5/12/2015: CEA---> 3.6  11/19/2015: MRI--->Stable serosal T2 hyperintense lesion on the media retroperitoneal lymph nodes are stable.  No significant change since prior exam.  Nonobstructing calculi in both kidneys are stable.   7/10/18: CT abdomen/pelvis: Minimal increase in size of bilobed cystic structure along the greater curvature of the stomac PREP KIT) 17.5-3.13-1.6 GM/177ML Oral Solution, Take as directed by physician., Disp: 1 kit, Rfl: 0  •  FEBUXOSTAT 80 MG Oral Tab, TAKE 1 TABLET BY MOUTH EVERY DAY, Disp: 90 tablet, Rfl: 1  •  acetaminophen 500 MG Oral Tab, Take 1,000 mg by mouth every 6 ( Colonoscopy,remv lesn,snare  11/5/2012    Procedure: ESOPHAGOGASTRODUODENOSCOPY, COLONOSCOPY, POSSIBLE BIOPSY, POSSIBLE POLYPECTOMY 13026,71378;  Surgeon: Elizabeth Jones MD;  Location: [address] [place] [address]   • Colonoscpy, flexible, proximal to spl [facility]   • Ir ureter tube removal via ileal conduit     • Lithotripsy  11/20/12   • Other surgical history      bilat knee scopes/multiple each knee   • Other surgical history      multiple ankle procedures/skin surgeries   • Other documented not to have experienced any of the following events N/A 1/25/2016    Procedure: COLONOSCOPY, POSSIBLE BIOPSY, POSSIBLE POLYPECTOMY 14475;  Surgeon: Lianne Hobson MD;  Location: [address] [place] [address]   • Patient with preoperative order f Pack years: 24        Types: Cigarettes, Cigars        Quit date: 2016        Years since quittin.1      Smokeless tobacco: Never Used      Tobacco comment: quit Dec 2016.  1 cigar per day    Alcohol use: No      Alcohol/week: 0.0 standard drinks craniocaudal extent   of this is 1.6 cm on image 15 (previously 2 cm). Mildly prominent but not pathologically enlarged lymph nodes in the right lower quadrant mesentery are unchanged. There are no new lesions detected.      Procedure(s):  None     Assess

## 2021-01-26 LAB — CREAT BLD-MCNC: 1.3 MG/DL

## 2021-02-12 ENCOUNTER — TELEPHONE (OUTPATIENT)
Dept: SCHEDULING | Age: 56
End: 2021-02-12

## 2021-02-12 RX ORDER — OXYCODONE HCL 40 MG/1
40 TABLET, FILM COATED, EXTENDED RELEASE ORAL EVERY 12 HOURS SCHEDULED
Qty: 60 TABLET | Refills: 0 | Status: CANCELLED | OUTPATIENT
Start: 2021-02-12 | End: 2021-10-20

## 2021-02-15 RX ORDER — OXYCODONE HCL 40 MG/1
40 TABLET, FILM COATED, EXTENDED RELEASE ORAL EVERY 12 HOURS SCHEDULED
Qty: 60 TABLET | Refills: 0 | Status: SHIPPED | OUTPATIENT
Start: 2021-02-15 | End: 2021-03-10 | Stop reason: SDUPTHER

## 2021-03-10 RX ORDER — OXYCODONE HCL 40 MG/1
40 TABLET, FILM COATED, EXTENDED RELEASE ORAL EVERY 12 HOURS SCHEDULED
Qty: 60 TABLET | Refills: 0 | Status: SHIPPED | OUTPATIENT
Start: 2021-03-10 | End: 2021-04-09 | Stop reason: SDUPTHER

## 2021-04-12 RX ORDER — OXYCODONE HCL 40 MG/1
40 TABLET, FILM COATED, EXTENDED RELEASE ORAL EVERY 12 HOURS SCHEDULED
Qty: 60 TABLET | Refills: 0 | Status: SHIPPED | OUTPATIENT
Start: 2021-04-12 | End: 2021-05-13 | Stop reason: SDUPTHER

## 2021-05-13 RX ORDER — OXYCODONE HCL 40 MG/1
40 TABLET, FILM COATED, EXTENDED RELEASE ORAL EVERY 12 HOURS SCHEDULED
Qty: 60 TABLET | Refills: 0 | Status: SHIPPED | OUTPATIENT
Start: 2021-05-13 | End: 2021-06-15 | Stop reason: SDUPTHER

## 2021-06-16 RX ORDER — OXYCODONE HCL 40 MG/1
40 TABLET, FILM COATED, EXTENDED RELEASE ORAL EVERY 12 HOURS SCHEDULED
Qty: 60 TABLET | Refills: 0 | Status: SHIPPED | OUTPATIENT
Start: 2021-06-16 | End: 2021-07-20 | Stop reason: SDUPTHER

## 2021-07-20 RX ORDER — OXYCODONE HCL 40 MG/1
40 TABLET, FILM COATED, EXTENDED RELEASE ORAL EVERY 12 HOURS SCHEDULED
Qty: 60 TABLET | Refills: 0 | Status: SHIPPED | OUTPATIENT
Start: 2021-07-20 | End: 2021-08-17 | Stop reason: SDUPTHER

## 2021-07-21 ENCOUNTER — LAB ENCOUNTER (OUTPATIENT)
Dept: LAB | Facility: HOSPITAL | Age: 56
End: 2021-07-21
Attending: PHYSICIAN ASSISTANT
Payer: MEDICARE

## 2021-07-21 ENCOUNTER — HOSPITAL ENCOUNTER (OUTPATIENT)
Dept: CT IMAGING | Facility: HOSPITAL | Age: 56
Discharge: HOME | End: 2021-07-21
Attending: PHYSICIAN ASSISTANT
Payer: MEDICARE

## 2021-07-21 DIAGNOSIS — Z79.899 LONG-TERM USE OF HIGH-RISK MEDICATION: ICD-10-CM

## 2021-07-21 DIAGNOSIS — Z85.09 PERSONAL HISTORY OF MALIGNANT NEOPLASM OF OTHER DIGESTIVE ORGANS: ICD-10-CM

## 2021-07-21 DIAGNOSIS — Z87.39 HISTORY OF GOUT: ICD-10-CM

## 2021-07-21 DIAGNOSIS — D01.49 CARCINOMA IN SITU OF OTHER PARTS OF INTESTINE: ICD-10-CM

## 2021-07-21 DIAGNOSIS — R97.1 ELEVATED CANCER ANTIGEN 125 (CA 125): ICD-10-CM

## 2021-07-21 DIAGNOSIS — C78.6 PSEUDOMYXOMA PERITONEI (HCC): ICD-10-CM

## 2021-07-21 DIAGNOSIS — M1A.09X0 CHRONIC GOUT OF MULTIPLE SITES, UNSPECIFIED CAUSE: ICD-10-CM

## 2021-07-21 LAB
ALBUMIN SERPL-MCNC: 4.1 G/DL (ref 3.4–5)
ALBUMIN/GLOB SERPL: 1.1 {RATIO} (ref 1–2)
ALP LIVER SERPL-CCNC: 89 U/L
ALT SERPL-CCNC: 29 U/L
ANION GAP SERPL CALC-SCNC: 3 MMOL/L (ref 0–18)
AST SERPL-CCNC: 16 U/L (ref 15–37)
BASOPHILS # BLD AUTO: 0.08 X10(3) UL (ref 0–0.2)
BASOPHILS NFR BLD AUTO: 0.6 %
BILIRUB SERPL-MCNC: 0.5 MG/DL (ref 0.1–2)
BUN BLD-MCNC: 14 MG/DL (ref 7–18)
BUN/CREAT SERPL: 10.8 (ref 10–20)
CALCIUM BLD-MCNC: 10.1 MG/DL (ref 8.5–10.1)
CANCER AG125 SERPL-ACNC: 2.4 U/ML (ref ?–35)
CANCER AG19-9 SERPL-ACNC: 9.4 U/ML (ref ?–37)
CEA SERPL-MCNC: 4.6 NG/ML (ref ?–5)
CHLORIDE SERPL-SCNC: 109 MMOL/L (ref 98–112)
CO2 SERPL-SCNC: 29 MMOL/L (ref 21–32)
CREAT BLD-MCNC: 1.3 MG/DL
DEPRECATED RDW RBC AUTO: 45.3 FL (ref 35.1–46.3)
EOSINOPHIL # BLD AUTO: 0.3 X10(3) UL (ref 0–0.7)
EOSINOPHIL NFR BLD AUTO: 2.1 %
ERYTHROCYTE [DISTWIDTH] IN BLOOD BY AUTOMATED COUNT: 12.4 % (ref 11–15)
GLOBULIN PLAS-MCNC: 3.8 G/DL (ref 2.8–4.4)
GLUCOSE BLD-MCNC: 96 MG/DL (ref 70–99)
HCT VFR BLD AUTO: 48.7 %
HGB BLD-MCNC: 16.2 G/DL
IMM GRANULOCYTES # BLD AUTO: 0.04 X10(3) UL (ref 0–1)
IMM GRANULOCYTES NFR BLD: 0.3 %
LYMPHOCYTES # BLD AUTO: 2.65 X10(3) UL (ref 1–4)
LYMPHOCYTES NFR BLD AUTO: 18.8 %
M PROTEIN MFR SERPL ELPH: 7.9 G/DL (ref 6.4–8.2)
MCH RBC QN AUTO: 32.9 PG (ref 26–34)
MCHC RBC AUTO-ENTMCNC: 33.3 G/DL (ref 31–37)
MCV RBC AUTO: 99 FL
MONOCYTES # BLD AUTO: 1.3 X10(3) UL (ref 0.1–1)
MONOCYTES NFR BLD AUTO: 9.2 %
NEUTROPHILS # BLD AUTO: 9.76 X10 (3) UL (ref 1.5–7.7)
NEUTROPHILS # BLD AUTO: 9.76 X10(3) UL (ref 1.5–7.7)
NEUTROPHILS NFR BLD AUTO: 69 %
OSMOLALITY SERPL CALC.SUM OF ELEC: 292 MOSM/KG (ref 275–295)
PATIENT FASTING Y/N/NP: YES
PLATELET # BLD AUTO: 274 10(3)UL (ref 150–450)
POTASSIUM SERPL-SCNC: 3.9 MMOL/L (ref 3.5–5.1)
RBC # BLD AUTO: 4.92 X10(6)UL
SODIUM SERPL-SCNC: 141 MMOL/L (ref 136–145)
URATE SERPL-MCNC: 4.9 MG/DL
WBC # BLD AUTO: 14.1 X10(3) UL (ref 4–11)

## 2021-07-21 PROCEDURE — 85025 COMPLETE CBC W/AUTO DIFF WBC: CPT

## 2021-07-21 PROCEDURE — 86304 IMMUNOASSAY TUMOR CA 125: CPT

## 2021-07-21 PROCEDURE — 74177 CT ABD & PELVIS W/CONTRAST: CPT | Performed by: PHYSICIAN ASSISTANT

## 2021-07-21 PROCEDURE — 36415 COLL VENOUS BLD VENIPUNCTURE: CPT

## 2021-07-21 PROCEDURE — 80053 COMPREHEN METABOLIC PANEL: CPT

## 2021-07-21 PROCEDURE — 82378 CARCINOEMBRYONIC ANTIGEN: CPT

## 2021-07-21 PROCEDURE — 86301 IMMUNOASSAY TUMOR CA 19-9: CPT

## 2021-07-21 PROCEDURE — 84550 ASSAY OF BLOOD/URIC ACID: CPT

## 2021-07-26 ENCOUNTER — TELEPHONE (OUTPATIENT)
Dept: INTERNAL MEDICINE | Age: 56
End: 2021-07-26

## 2021-07-28 ENCOUNTER — OFFICE VISIT (OUTPATIENT)
Dept: SURGERY | Facility: CLINIC | Age: 56
End: 2021-07-28
Payer: MEDICARE

## 2021-07-28 VITALS
SYSTOLIC BLOOD PRESSURE: 133 MMHG | OXYGEN SATURATION: 95 % | RESPIRATION RATE: 16 BRPM | HEART RATE: 89 BPM | BODY MASS INDEX: 32 KG/M2 | TEMPERATURE: 97 F | WEIGHT: 240.81 LBS | DIASTOLIC BLOOD PRESSURE: 84 MMHG

## 2021-07-28 DIAGNOSIS — C78.6 PSEUDOMYXOMA PERITONEI (HCC): Primary | ICD-10-CM

## 2021-07-28 DIAGNOSIS — C18.1 CANCER OF APPENDIX (HCC): ICD-10-CM

## 2021-07-28 PROCEDURE — 99214 OFFICE O/P EST MOD 30 MIN: CPT | Performed by: SURGERY

## 2021-07-28 NOTE — PROGRESS NOTES
[address] [facility]                         Patient Name:  Hammad Preciado   YOB: 1965   Gender:  Male   Appt Date:  7/28/2021   Provider:  Agnes Paulino MD   Insurance:  MEDICARE PART B ONLY     PATIENT PROVIDERS  Primary Additional implant along the posterior margin of the liver right lobe 1.5 x 0.9 cm (previously 1.8 x 1.3 cm). Several small gastrohepatic and portacaval nodes unchanged or smaller.   5/12/2015: CEA---> 3.6  11/19/2015: MRI--->Stable serosal T2 hyperintense nonenlarged   retroperitoneal lymph nodes are stable.  No significant change since prior exam.  Nonobstructing calculi in both kidneys are stable.   7/10/18: CT abdomen/pelvis: Minimal increase in size of bilobed cystic structure along the greater curvature is 1.6 cm on image 15 (previously 2 cm). Mildly prominent but not pathologically enlarged lymph nodes in the right lower quadrant mesentery are unchanged. There are no new lesions detected. Presents today for follow-up.  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[facility])    • Visual impairment     glasses      Reviewed:  Past Surgical History:   Procedure Laterality Date   • Colonoscopy     • Colonoscopy & polypectomy  11/12 contrast, dx/therapeutic substance, epidural/subarachnoid; lumbar/sacral  3/27/2014    Procedure: LUMBAR EPIDURAL;  Surgeon: Marquis Johnson MD;  Location: [facility]   • Injection, w/wo contrast, dx/therapeutic substance, epidural/suba documented not to have experienced any of the following events  3/27/2014    Procedure: LUMBAR EPIDURAL;  Surgeon: Emiliana Dominguez MD;  Location: [address] [place] [address]   • Patient documented not to have experienced any of the following events  4/1 antibiotic surgical site infection prophylaxis.  N/A 1/25/2016    Procedure: COLONOSCOPY, POSSIBLE BIOPSY, POSSIBLE POLYPECTOMY 43625;  Surgeon: [place] Longo MD;  Location: [address] [place] [address]   • Upper gi endoscopy,diagnosis  11/5/2012    Proce non-distended. No masses or hepatosplenomegaly. Musculoskeletal: Extremities: no edema. Skin: Inspection and palpation: no jaundice.       Documents:    Tumor markers        CT 7/21/2021:  increase in the size of the cystic lesion along the serosal surf

## 2021-08-17 DIAGNOSIS — C78.6 MALIGNANT PSEUDOMYXOMA PERITONEI (CMD): Primary | ICD-10-CM

## 2021-08-17 RX ORDER — OXYCODONE HCL 40 MG/1
40 TABLET, FILM COATED, EXTENDED RELEASE ORAL EVERY 12 HOURS SCHEDULED
Qty: 60 TABLET | Refills: 0 | Status: SHIPPED | OUTPATIENT
Start: 2021-08-17 | End: 2021-09-16 | Stop reason: SDUPTHER

## 2021-08-23 ENCOUNTER — TELEPHONE (OUTPATIENT)
Dept: SCHEDULING | Age: 56
End: 2021-08-23

## 2021-08-25 ENCOUNTER — TELEPHONE (OUTPATIENT)
Dept: SCHEDULING | Age: 56
End: 2021-08-25

## 2021-09-07 ENCOUNTER — LAB SERVICES (OUTPATIENT)
Dept: LAB | Age: 56
End: 2021-09-07

## 2021-09-07 ENCOUNTER — OFFICE VISIT (OUTPATIENT)
Dept: INTERNAL MEDICINE | Age: 56
End: 2021-09-07

## 2021-09-07 VITALS
TEMPERATURE: 98 F | HEIGHT: 72 IN | HEART RATE: 70 BPM | OXYGEN SATURATION: 96 % | BODY MASS INDEX: 32.23 KG/M2 | DIASTOLIC BLOOD PRESSURE: 96 MMHG | WEIGHT: 238 LBS | SYSTOLIC BLOOD PRESSURE: 142 MMHG | RESPIRATION RATE: 16 BRPM

## 2021-09-07 DIAGNOSIS — C78.6 MALIGNANT PSEUDOMYXOMA PERITONEI (CMD): ICD-10-CM

## 2021-09-07 DIAGNOSIS — Z00.00 PHYSICAL EXAM, ROUTINE: Primary | ICD-10-CM

## 2021-09-07 LAB — S PYO AG THROAT QL: POSITIVE

## 2021-09-07 PROCEDURE — 99396 PREV VISIT EST AGE 40-64: CPT | Performed by: INTERNAL MEDICINE

## 2021-09-07 PROCEDURE — 82270 OCCULT BLOOD FECES: CPT | Performed by: INTERNAL MEDICINE

## 2021-09-07 RX ORDER — OXYCODONE HCL 40 MG/1
40 TABLET, FILM COATED, EXTENDED RELEASE ORAL EVERY 12 HOURS SCHEDULED
Qty: 60 TABLET | Refills: 0 | Status: CANCELLED | OUTPATIENT
Start: 2021-09-07 | End: 2022-05-15

## 2021-09-07 ASSESSMENT — PATIENT HEALTH QUESTIONNAIRE - PHQ9
CLINICAL INTERPRETATION OF PHQ9 SCORE: MILD DEPRESSION
CLINICAL INTERPRETATION OF PHQ2 SCORE: MILD DEPRESSION
SUM OF ALL RESPONSES TO PHQ9 QUESTIONS 1 AND 2: 1
8. MOVING OR SPEAKING SO SLOWLY THAT OTHER PEOPLE COULD HAVE NOTICED. OR THE OPPOSITE, BEING SO FIGETY OR RESTLESS THAT YOU HAVE BEEN MOVING AROUND A LOT MORE THAN USUAL: NOT AT ALL
SUM OF ALL RESPONSES TO PHQ QUESTIONS 1-9: 5
7. TROUBLE CONCENTRATING ON THINGS, SUCH AS READING THE NEWSPAPER OR WATCHING TELEVISION: NOT AT ALL
2. FEELING DOWN, DEPRESSED OR HOPELESS: SEVERAL DAYS
2. FEELING DOWN, DEPRESSED OR HOPELESS: NOT AT ALL
9. THOUGHTS THAT YOU WOULD BE BETTER OFF DEAD, OR OF HURTING YOURSELF: NOT AT ALL
CLINICAL INTERPRETATION OF PHQ9 SCORE: NO FURTHER SCREENING NEEDED
3. TROUBLE FALLING OR STAYING ASLEEP OR SLEEPING TOO MUCH: NEARLY EVERY DAY
SUM OF ALL RESPONSES TO PHQ9 QUESTIONS 1 TO 9: 5
SUM OF ALL RESPONSES TO PHQ9 QUESTIONS 1 AND 2: 1
6. FEELING BAD ABOUT YOURSELF - OR THAT YOU ARE A FAILURE OR HAVE LET YOURSELF OR YOUR FAMILY DOWN: NOT AT ALL
1. LITTLE INTEREST OR PLEASURE IN DOING THINGS: NOT AT ALL
5. POOR APPETITE, WEIGHT LOSS, OR OVEREATING: NOT AT ALL
1. LITTLE INTEREST OR PLEASURE IN DOING THINGS: SEVERAL DAYS
CLINICAL INTERPRETATION OF PHQ2 SCORE: NO FURTHER SCREENING NEEDED
CLINICAL INTERPRETATION OF PHQ2 SCORE: NO FURTHER SCREENING NEEDED
4. FEELING TIRED OR HAVING LITTLE ENERGY: SEVERAL DAYS
SUM OF ALL RESPONSES TO PHQ9 QUESTIONS 1 AND 2: 1
10. IF YOU CHECKED OFF ANY PROBLEMS, HOW DIFFICULT HAVE THESE PROBLEMS MADE IT FOR YOU TO DO YOUR WORK, TAKE CARE OF THINGS AT HOME, OR GET ALONG WITH OTHER PEOPLE: NOT DIFFICULT AT ALL

## 2021-09-14 ENCOUNTER — TELEPHONE (OUTPATIENT)
Dept: SCHEDULING | Age: 56
End: 2021-09-14

## 2021-09-14 DIAGNOSIS — C78.6 MALIGNANT PSEUDOMYXOMA PERITONEI (CMD): ICD-10-CM

## 2021-09-14 RX ORDER — OXYCODONE HCL 40 MG/1
40 TABLET, FILM COATED, EXTENDED RELEASE ORAL EVERY 12 HOURS SCHEDULED
Qty: 60 TABLET | Refills: 0 | Status: CANCELLED | OUTPATIENT
Start: 2021-09-14 | End: 2022-05-22

## 2021-09-16 RX ORDER — OXYCODONE HCL 40 MG/1
40 TABLET, FILM COATED, EXTENDED RELEASE ORAL EVERY 12 HOURS SCHEDULED
Qty: 60 TABLET | Refills: 0 | Status: SHIPPED | OUTPATIENT
Start: 2021-09-16 | End: 2021-10-15 | Stop reason: SDUPTHER

## 2021-10-15 DIAGNOSIS — C78.6 MALIGNANT PSEUDOMYXOMA PERITONEI (CMD): ICD-10-CM

## 2021-10-15 RX ORDER — OXYCODONE HCL 40 MG/1
40 TABLET, FILM COATED, EXTENDED RELEASE ORAL EVERY 12 HOURS SCHEDULED
Qty: 60 TABLET | Refills: 0 | Status: SHIPPED | OUTPATIENT
Start: 2021-10-15 | End: 2021-11-12 | Stop reason: SDUPTHER

## 2021-10-27 ENCOUNTER — TELEPHONE (OUTPATIENT)
Dept: SURGERY | Facility: CLINIC | Age: 56
End: 2021-10-27

## 2021-10-27 NOTE — TELEPHONE ENCOUNTER
Pt called and stated that he was having worsening abdominal pain all week that has now subsided. He stated it is now a dull constant pain. I let him know that we would change the expected date of CT and schedule him an appointment to see us in office.   P

## 2021-10-28 ENCOUNTER — HOSPITAL ENCOUNTER (OUTPATIENT)
Dept: CT IMAGING | Facility: HOSPITAL | Age: 56
Discharge: HOME | End: 2021-10-28
Attending: SURGERY
Payer: MEDICARE

## 2021-10-28 DIAGNOSIS — C18.1 CANCER OF APPENDIX (HCC): ICD-10-CM

## 2021-10-28 DIAGNOSIS — C78.6 PSEUDOMYXOMA PERITONEI (HCC): ICD-10-CM

## 2021-10-28 PROCEDURE — 82565 ASSAY OF CREATININE: CPT

## 2021-10-28 PROCEDURE — 74177 CT ABD & PELVIS W/CONTRAST: CPT | Performed by: SURGERY

## 2021-10-28 PROCEDURE — 71260 CT THORAX DX C+: CPT | Performed by: SURGERY

## 2021-11-01 ENCOUNTER — OFFICE VISIT (OUTPATIENT)
Dept: INTERNAL MEDICINE | Age: 56
End: 2021-11-01

## 2021-11-01 ENCOUNTER — TELEPHONE (OUTPATIENT)
Dept: SCHEDULING | Age: 56
End: 2021-11-01

## 2021-11-01 VITALS
TEMPERATURE: 97.8 F | WEIGHT: 243 LBS | DIASTOLIC BLOOD PRESSURE: 80 MMHG | OXYGEN SATURATION: 98 % | HEART RATE: 80 BPM | BODY MASS INDEX: 32.77 KG/M2 | SYSTOLIC BLOOD PRESSURE: 138 MMHG | RESPIRATION RATE: 14 BRPM

## 2021-11-01 DIAGNOSIS — C18.1 ADENOCARCINOMA, APPENDIX (CMD): ICD-10-CM

## 2021-11-01 PROCEDURE — 99213 OFFICE O/P EST LOW 20 MIN: CPT | Performed by: INTERNAL MEDICINE

## 2021-11-01 ASSESSMENT — PAIN SCALES - GENERAL: PAINLEVEL: 0

## 2021-11-03 ENCOUNTER — OFFICE VISIT (OUTPATIENT)
Dept: SURGERY | Facility: CLINIC | Age: 56
End: 2021-11-03
Payer: MEDICARE

## 2021-11-03 VITALS
SYSTOLIC BLOOD PRESSURE: 151 MMHG | TEMPERATURE: 97 F | HEART RATE: 74 BPM | BODY MASS INDEX: 32 KG/M2 | WEIGHT: 239 LBS | OXYGEN SATURATION: 97 % | RESPIRATION RATE: 16 BRPM | DIASTOLIC BLOOD PRESSURE: 83 MMHG

## 2021-11-03 DIAGNOSIS — C78.6 PSEUDOMYXOMA PERITONEI (HCC): Primary | ICD-10-CM

## 2021-11-03 DIAGNOSIS — C18.1 MALIGNANT NEOPLASM OF APPENDIX (HCC): ICD-10-CM

## 2021-11-03 PROCEDURE — 99213 OFFICE O/P EST LOW 20 MIN: CPT | Performed by: SURGERY

## 2021-11-03 PROCEDURE — 82378 CARCINOEMBRYONIC ANTIGEN: CPT | Performed by: SURGERY

## 2021-11-03 NOTE — PROGRESS NOTES
[facility]                          Patient Name:  Marin Martinez   YOB: 1965   Gender:  Male   Appt Date:  11/3/2021   Provider:  Poncho Calix MD         PATIENT PROVIDERS  Primary Care Kerry Gar posterior margin of the liver right lobe 1.5 x 0.9 cm (previously 1.8 x 1.3 cm). Several small gastrohepatic and portacaval nodes unchanged or smaller.   5/12/2015: CEA---> 3.6  11/19/2015: MRI--->Stable serosal T2 hyperintense lesion on the medial aspect o lymph nodes are stable.  No significant change since prior exam.  Nonobstructing calculi in both kidneys are stable.   7/10/18: CT abdomen/pelvis: Minimal increase in size of bilobed cystic structure along the greater curvature of the stomach likely related Oral Tab, Take 1 tablet by mouth daily. , Disp: 90 tablet, Rfl: 1  •  oxyCODONE HCl ER 40 MG Oral Tablet Extended Release 12 hour Abuse-Deterrent, Take 40 mg by mouth Q12H., Disp: , Rfl:   •  acetaminophen 500 MG Oral Tab, Take 1,000 mg by mouth every 6 (si Colonoscopy,remv lesn,snare  11/5/2012    Procedure: ESOPHAGOGASTRODUODENOSCOPY, COLONOSCOPY, POSSIBLE BIOPSY, POSSIBLE POLYPECTOMY 78674,43388;  Surgeon: Laureen Kamara MD;  Location: [address] [place] [address]   • Colonoscpy, flexible, proximal to spl [facility]   • Ir ureter tube removal via ileal conduit     • Lithotripsy  11/20/12   • Other surgical history      bilat knee scopes/multiple each knee   • Other surgical history      multiple ankle procedures/skin surgeries   • Other documented not to have experienced any of the following events N/A 1/25/2016    Procedure: COLONOSCOPY, POSSIBLE BIOPSY, POSSIBLE POLYPECTOMY 47013;  Surgeon: Tony Lawson MD;  Location: [address] [place] [address]   • Patient with preoperative order f Pack years: 24        Types: Cigarettes, Cigars        Quit date: 2016        Years since quittin.9      Smokeless tobacco: Never Used      Tobacco comment: quit Dec 2016.  1 cigar per day    Vaping Use      Vaping Use: Never used    Alcohol use: measures 9.0 x 4.4 x 3.1 cm, previously 5.6 x 2.4 x 2.9 cm.  Small peritoneal calcification anteriorly in the midline on image   236 of series 2 is stable.  Linear soft tissue in the left upper quadrant measuring 3.6 x 0.7 cm is not significantly changed.

## 2021-11-12 DIAGNOSIS — C78.6 MALIGNANT PSEUDOMYXOMA PERITONEI (CMD): ICD-10-CM

## 2021-11-15 RX ORDER — OXYCODONE HCL 40 MG/1
40 TABLET, FILM COATED, EXTENDED RELEASE ORAL EVERY 12 HOURS SCHEDULED
Qty: 60 TABLET | Refills: 0 | Status: SHIPPED | OUTPATIENT
Start: 2021-11-15 | End: 2021-12-17 | Stop reason: SDUPTHER

## 2021-12-17 ENCOUNTER — TELEPHONE (OUTPATIENT)
Dept: SCHEDULING | Age: 56
End: 2021-12-17

## 2021-12-17 ENCOUNTER — TELEPHONE (OUTPATIENT)
Dept: INTERNAL MEDICINE | Age: 56
End: 2021-12-17

## 2021-12-17 ENCOUNTER — E-ADVICE (OUTPATIENT)
Dept: INTERNAL MEDICINE | Age: 56
End: 2021-12-17

## 2021-12-17 DIAGNOSIS — C78.6 MALIGNANT PSEUDOMYXOMA PERITONEI (CMD): ICD-10-CM

## 2021-12-17 RX ORDER — OXYCODONE HCL 40 MG/1
40 TABLET, FILM COATED, EXTENDED RELEASE ORAL EVERY 12 HOURS SCHEDULED
Qty: 60 TABLET | Refills: 0 | Status: SHIPPED | OUTPATIENT
Start: 2021-12-17 | End: 2022-01-11 | Stop reason: SDUPTHER

## 2022-01-11 ENCOUNTER — TELEPHONE (OUTPATIENT)
Dept: SCHEDULING | Age: 57
End: 2022-01-11

## 2022-01-11 DIAGNOSIS — C78.6 MALIGNANT PSEUDOMYXOMA PERITONEI (CMD): ICD-10-CM

## 2022-01-11 RX ORDER — OXYCODONE HCL 40 MG/1
40 TABLET, FILM COATED, EXTENDED RELEASE ORAL EVERY 12 HOURS SCHEDULED
Qty: 60 TABLET | Refills: 0 | Status: SHIPPED | OUTPATIENT
Start: 2022-01-11 | End: 2022-02-10 | Stop reason: SDUPTHER

## 2022-01-24 ENCOUNTER — TELEPHONE (OUTPATIENT)
Dept: SURGERY | Facility: CLINIC | Age: 57
End: 2022-01-24

## 2022-01-24 DIAGNOSIS — R97.0 ELEVATED CEA: ICD-10-CM

## 2022-01-24 DIAGNOSIS — C78.6 PSEUDOMYXOMA PERITONEI (HCC): Primary | ICD-10-CM

## 2022-01-24 DIAGNOSIS — C48.8 MALIGNANT NEOPLASM OF OVERLAPPING SITES OF RETROPERITONEUM AND PERITONEUM (HCC): ICD-10-CM

## 2022-01-24 DIAGNOSIS — C18.1 MALIGNANT NEOPLASM OF APPENDIX (HCC): ICD-10-CM

## 2022-01-24 DIAGNOSIS — D37.9 NEOPLASM OF UNCERTAIN BEHAVIOR OF DIGESTIVE ORGAN, UNSPECIFIED: ICD-10-CM

## 2022-01-24 NOTE — TELEPHONE ENCOUNTER
Pt called regarding follow up of imaging and labs. Tumor markers ordered and will follow up after labs resulted. Pt stated understanding.

## 2022-01-25 PROCEDURE — 86301 IMMUNOASSAY TUMOR CA 19-9: CPT | Performed by: PHYSICIAN ASSISTANT

## 2022-01-25 PROCEDURE — 86304 IMMUNOASSAY TUMOR CA 125: CPT | Performed by: PHYSICIAN ASSISTANT

## 2022-01-25 PROCEDURE — 82378 CARCINOEMBRYONIC ANTIGEN: CPT | Performed by: PHYSICIAN ASSISTANT

## 2022-02-02 ENCOUNTER — TELEPHONE (OUTPATIENT)
Dept: SURGERY | Facility: CLINIC | Age: 57
End: 2022-02-02

## 2022-02-10 DIAGNOSIS — C78.6 MALIGNANT PSEUDOMYXOMA PERITONEI (CMD): ICD-10-CM

## 2022-02-10 RX ORDER — OXYCODONE HCL 40 MG/1
40 TABLET, FILM COATED, EXTENDED RELEASE ORAL EVERY 12 HOURS SCHEDULED
Qty: 60 TABLET | Refills: 0 | Status: SHIPPED | OUTPATIENT
Start: 2022-02-10 | End: 2022-03-11 | Stop reason: SDUPTHER

## 2022-03-09 DIAGNOSIS — C78.6 MALIGNANT PSEUDOMYXOMA PERITONEI (CMD): ICD-10-CM

## 2022-03-11 ENCOUNTER — TELEPHONE (OUTPATIENT)
Dept: SCHEDULING | Age: 57
End: 2022-03-11

## 2022-03-11 DIAGNOSIS — C78.6 MALIGNANT PSEUDOMYXOMA PERITONEI (CMD): ICD-10-CM

## 2022-03-11 RX ORDER — OXYCODONE HCL 40 MG/1
40 TABLET, FILM COATED, EXTENDED RELEASE ORAL EVERY 12 HOURS SCHEDULED
Qty: 60 TABLET | Refills: 0 | Status: SHIPPED | OUTPATIENT
Start: 2022-03-11 | End: 2022-04-11 | Stop reason: SDUPTHER

## 2022-04-07 ENCOUNTER — LAB ENCOUNTER (OUTPATIENT)
Dept: LAB | Facility: HOSPITAL | Age: 57
End: 2022-04-07
Attending: PHYSICIAN ASSISTANT
Payer: MEDICARE

## 2022-04-07 ENCOUNTER — HOSPITAL ENCOUNTER (OUTPATIENT)
Dept: CT IMAGING | Facility: HOSPITAL | Age: 57
Discharge: HOME | End: 2022-04-07
Attending: PHYSICIAN ASSISTANT
Payer: MEDICARE

## 2022-04-07 DIAGNOSIS — N18.2 CHRONIC RENAL IMPAIRMENT, STAGE 2 (MILD): ICD-10-CM

## 2022-04-07 DIAGNOSIS — C78.6 PSEUDOMYXOMA PERITONEI (HCC): ICD-10-CM

## 2022-04-07 DIAGNOSIS — C7A.020 MALIGNANT CARCINOID TUMOR OF THE APPENDIX (HCC): ICD-10-CM

## 2022-04-07 DIAGNOSIS — M1A.09X0 CHRONIC GOUT OF MULTIPLE SITES, UNSPECIFIED CAUSE: ICD-10-CM

## 2022-04-07 DIAGNOSIS — Z87.39 HISTORY OF GOUT: ICD-10-CM

## 2022-04-07 DIAGNOSIS — C48.1 MALIGNANT NEOPLASM OF SPECIFIED PARTS OF PERITONEUM (HCC): ICD-10-CM

## 2022-04-07 DIAGNOSIS — D37.9 NEOPLASM OF UNCERTAIN BEHAVIOR OF DIGESTIVE ORGAN, UNSPECIFIED: ICD-10-CM

## 2022-04-07 DIAGNOSIS — Z79.899 LONG-TERM USE OF HIGH-RISK MEDICATION: ICD-10-CM

## 2022-04-07 LAB
ALBUMIN SERPL-MCNC: 4 G/DL (ref 3.4–5)
ALBUMIN/GLOB SERPL: 1.1 {RATIO} (ref 1–2)
ALP LIVER SERPL-CCNC: 98 U/L
ALT SERPL-CCNC: 30 U/L
ANION GAP SERPL CALC-SCNC: 5 MMOL/L (ref 0–18)
AST SERPL-CCNC: 25 U/L (ref 15–37)
BASOPHILS # BLD AUTO: 0.07 X10(3) UL (ref 0–0.2)
BASOPHILS NFR BLD AUTO: 0.8 %
BILIRUB SERPL-MCNC: 0.4 MG/DL (ref 0.1–2)
BUN BLD-MCNC: 15 MG/DL (ref 7–18)
CALCIUM BLD-MCNC: 9.5 MG/DL (ref 8.5–10.1)
CANCER AG125 SERPL-ACNC: 3.1 U/ML (ref ?–35)
CANCER AG19-9 SERPL-ACNC: 8.3 U/ML (ref ?–37)
CEA SERPL-MCNC: 2.6 NG/ML (ref ?–5)
CHLORIDE SERPL-SCNC: 104 MMOL/L (ref 98–112)
CO2 SERPL-SCNC: 28 MMOL/L (ref 21–32)
CREAT BLD-MCNC: 1.23 MG/DL
CREAT BLD-MCNC: 1.3 MG/DL
EOSINOPHIL # BLD AUTO: 0.31 X10(3) UL (ref 0–0.7)
EOSINOPHIL NFR BLD AUTO: 3.6 %
ERYTHROCYTE [DISTWIDTH] IN BLOOD BY AUTOMATED COUNT: 12.2 %
FASTING STATUS PATIENT QL REPORTED: YES
GLOBULIN PLAS-MCNC: 3.7 G/DL (ref 2.8–4.4)
GLUCOSE BLD-MCNC: 103 MG/DL (ref 70–99)
HCT VFR BLD AUTO: 50 %
HGB BLD-MCNC: 16.7 G/DL
IMM GRANULOCYTES # BLD AUTO: 0.02 X10(3) UL (ref 0–1)
IMM GRANULOCYTES NFR BLD: 0.2 %
LYMPHOCYTES # BLD AUTO: 3.13 X10(3) UL (ref 1–4)
LYMPHOCYTES NFR BLD AUTO: 36.3 %
MCH RBC QN AUTO: 32.7 PG (ref 26–34)
MCHC RBC AUTO-ENTMCNC: 33.4 G/DL (ref 31–37)
MCV RBC AUTO: 97.8 FL
MONOCYTES # BLD AUTO: 0.83 X10(3) UL (ref 0.1–1)
MONOCYTES NFR BLD AUTO: 9.6 %
NEUTROPHILS # BLD AUTO: 4.27 X10 (3) UL (ref 1.5–7.7)
NEUTROPHILS # BLD AUTO: 4.27 X10(3) UL (ref 1.5–7.7)
NEUTROPHILS NFR BLD AUTO: 49.5 %
OSMOLALITY SERPL CALC.SUM OF ELEC: 285 MOSM/KG (ref 275–295)
PLATELET # BLD AUTO: 279 10(3)UL (ref 150–450)
POTASSIUM SERPL-SCNC: 5.1 MMOL/L (ref 3.5–5.1)
PROT SERPL-MCNC: 7.7 G/DL (ref 6.4–8.2)
RBC # BLD AUTO: 5.11 X10(6)UL
SODIUM SERPL-SCNC: 137 MMOL/L (ref 136–145)
URATE SERPL-MCNC: 4.8 MG/DL
WBC # BLD AUTO: 8.6 X10(3) UL (ref 4–11)

## 2022-04-07 PROCEDURE — 80053 COMPREHEN METABOLIC PANEL: CPT

## 2022-04-07 PROCEDURE — 84550 ASSAY OF BLOOD/URIC ACID: CPT

## 2022-04-07 PROCEDURE — 82378 CARCINOEMBRYONIC ANTIGEN: CPT

## 2022-04-07 PROCEDURE — 36415 COLL VENOUS BLD VENIPUNCTURE: CPT

## 2022-04-07 PROCEDURE — 82565 ASSAY OF CREATININE: CPT

## 2022-04-07 PROCEDURE — 86304 IMMUNOASSAY TUMOR CA 125: CPT

## 2022-04-07 PROCEDURE — 86301 IMMUNOASSAY TUMOR CA 19-9: CPT

## 2022-04-07 PROCEDURE — 85025 COMPLETE CBC W/AUTO DIFF WBC: CPT

## 2022-04-07 PROCEDURE — 74177 CT ABD & PELVIS W/CONTRAST: CPT | Performed by: PHYSICIAN ASSISTANT

## 2022-04-07 RX ORDER — IOHEXOL 350 MG/ML
100 INJECTION, SOLUTION INTRAVENOUS
Status: COMPLETED | OUTPATIENT
Start: 2022-04-07 | End: 2022-04-07

## 2022-04-07 RX ADMIN — IOHEXOL 100 ML: 350 INJECTION, SOLUTION INTRAVENOUS at 08:01:00

## 2022-04-11 DIAGNOSIS — C78.6 MALIGNANT PSEUDOMYXOMA PERITONEI (CMD): ICD-10-CM

## 2022-04-11 RX ORDER — OXYCODONE HCL 40 MG/1
40 TABLET, FILM COATED, EXTENDED RELEASE ORAL EVERY 12 HOURS SCHEDULED
Qty: 60 TABLET | Refills: 0 | Status: CANCELLED | OUTPATIENT
Start: 2022-04-11 | End: 2022-05-11

## 2022-04-11 RX ORDER — OXYCODONE HCL 40 MG/1
40 TABLET, FILM COATED, EXTENDED RELEASE ORAL EVERY 12 HOURS SCHEDULED
Qty: 60 TABLET | Refills: 0 | Status: SHIPPED | OUTPATIENT
Start: 2022-04-11 | End: 2022-05-13 | Stop reason: SDUPTHER

## 2022-04-13 ENCOUNTER — EXTERNAL RECORD (OUTPATIENT)
Dept: HEALTH INFORMATION MANAGEMENT | Facility: OTHER | Age: 57
End: 2022-04-13

## 2022-04-13 ENCOUNTER — OFFICE VISIT (OUTPATIENT)
Dept: SURGERY | Facility: CLINIC | Age: 57
End: 2022-04-13
Payer: MEDICARE

## 2022-04-13 VITALS
TEMPERATURE: 98 F | BODY MASS INDEX: 32 KG/M2 | SYSTOLIC BLOOD PRESSURE: 123 MMHG | RESPIRATION RATE: 16 BRPM | HEART RATE: 67 BPM | WEIGHT: 243 LBS | DIASTOLIC BLOOD PRESSURE: 78 MMHG | OXYGEN SATURATION: 94 %

## 2022-04-13 DIAGNOSIS — C78.6 PSEUDOMYXOMA PERITONEI (HCC): Primary | ICD-10-CM

## 2022-04-13 PROCEDURE — 99213 OFFICE O/P EST LOW 20 MIN: CPT | Performed by: SURGERY

## 2022-05-10 ENCOUNTER — TELEPHONE (OUTPATIENT)
Dept: SCHEDULING | Age: 57
End: 2022-05-10

## 2022-05-10 DIAGNOSIS — C78.6 MALIGNANT PSEUDOMYXOMA PERITONEI (CMD): ICD-10-CM

## 2022-05-10 RX ORDER — OXYCODONE HCL 40 MG/1
40 TABLET, FILM COATED, EXTENDED RELEASE ORAL EVERY 12 HOURS SCHEDULED
Qty: 60 TABLET | Refills: 0 | Status: CANCELLED | OUTPATIENT
Start: 2022-05-10 | End: 2022-06-09

## 2022-05-12 ENCOUNTER — TELEPHONE (OUTPATIENT)
Dept: SCHEDULING | Age: 57
End: 2022-05-12

## 2022-05-12 DIAGNOSIS — C78.6 MALIGNANT PSEUDOMYXOMA PERITONEI (CMD): ICD-10-CM

## 2022-05-12 RX ORDER — OXYCODONE HCL 40 MG/1
40 TABLET, FILM COATED, EXTENDED RELEASE ORAL EVERY 12 HOURS SCHEDULED
Qty: 60 TABLET | Refills: 0 | Status: CANCELLED | OUTPATIENT
Start: 2022-05-12 | End: 2022-06-11

## 2022-05-13 ENCOUNTER — TELEPHONE (OUTPATIENT)
Dept: SCHEDULING | Age: 57
End: 2022-05-13

## 2022-05-13 ENCOUNTER — E-ADVICE (OUTPATIENT)
Dept: INTERNAL MEDICINE | Age: 57
End: 2022-05-13

## 2022-05-13 DIAGNOSIS — C78.6 MALIGNANT PSEUDOMYXOMA PERITONEI (CMD): ICD-10-CM

## 2022-05-13 RX ORDER — OXYCODONE HCL 40 MG/1
40 TABLET, FILM COATED, EXTENDED RELEASE ORAL EVERY 12 HOURS SCHEDULED
Qty: 60 TABLET | Refills: 0 | Status: SHIPPED | OUTPATIENT
Start: 2022-05-13 | End: 2022-07-12 | Stop reason: SDUPTHER

## 2022-05-13 RX ORDER — OXYCODONE HCL 40 MG/1
40 TABLET, FILM COATED, EXTENDED RELEASE ORAL EVERY 12 HOURS SCHEDULED
Qty: 60 TABLET | Refills: 0 | Status: CANCELLED | OUTPATIENT
Start: 2022-05-13 | End: 2022-06-12

## 2022-05-31 RX ORDER — OXYCODONE HCL 40 MG/1
40 TABLET, FILM COATED, EXTENDED RELEASE ORAL EVERY 12 HOURS SCHEDULED
Qty: 60 TABLET | Refills: 0 | Status: SHIPPED | OUTPATIENT
Start: 2022-05-31 | End: 2022-10-18 | Stop reason: SDUPTHER

## 2022-06-14 ENCOUNTER — TELEPHONE (OUTPATIENT)
Dept: SCHEDULING | Age: 57
End: 2022-06-14

## 2022-06-14 ENCOUNTER — TELEPHONE (OUTPATIENT)
Dept: INTERNAL MEDICINE | Age: 57
End: 2022-06-14

## 2022-06-30 ENCOUNTER — TELEPHONE (OUTPATIENT)
Dept: INTERNAL MEDICINE | Age: 57
End: 2022-06-30

## 2022-07-12 DIAGNOSIS — C78.6 MALIGNANT PSEUDOMYXOMA PERITONEI (CMD): ICD-10-CM

## 2022-07-12 RX ORDER — OXYCODONE HCL 40 MG/1
40 TABLET, FILM COATED, EXTENDED RELEASE ORAL EVERY 12 HOURS SCHEDULED
Qty: 60 TABLET | Refills: 0 | Status: SHIPPED | OUTPATIENT
Start: 2022-07-12 | End: 2022-08-12 | Stop reason: SDUPTHER

## 2022-07-26 ENCOUNTER — TELEPHONE (OUTPATIENT)
Dept: INTERNAL MEDICINE | Age: 57
End: 2022-07-26

## 2022-08-01 ENCOUNTER — OFFICE VISIT (OUTPATIENT)
Dept: INTERNAL MEDICINE | Age: 57
End: 2022-08-01

## 2022-08-01 VITALS
HEIGHT: 73 IN | BODY MASS INDEX: 32.51 KG/M2 | TEMPERATURE: 97.9 F | WEIGHT: 245.26 LBS | RESPIRATION RATE: 18 BRPM | HEART RATE: 70 BPM | OXYGEN SATURATION: 95 % | SYSTOLIC BLOOD PRESSURE: 127 MMHG | DIASTOLIC BLOOD PRESSURE: 89 MMHG

## 2022-08-01 DIAGNOSIS — Z00.00 MEDICARE ANNUAL WELLNESS VISIT, INITIAL: Primary | ICD-10-CM

## 2022-08-01 PROCEDURE — G0438 PPPS, INITIAL VISIT: HCPCS | Performed by: INTERNAL MEDICINE

## 2022-08-01 RX ORDER — SODIUM FLUORIDE 5 MG/G
GEL, DENTIFRICE DENTAL
COMMUNITY
Start: 2022-04-04

## 2022-08-01 RX ORDER — FEBUXOSTAT 80 MG/1
1 TABLET, FILM COATED ORAL DAILY
COMMUNITY
Start: 2022-06-27

## 2022-08-01 ASSESSMENT — COGNITIVE AND FUNCTIONAL STATUS - GENERAL
ARE YOU BLIND OR DO YOU HAVE SERIOUS DIFFICULTY SEEING, EVEN WHEN WEARING GLASSES: NO
ARE YOU DEAF OR DO YOU HAVE SERIOUS DIFFICULTY  HEARING: NO

## 2022-08-01 ASSESSMENT — ACTIVITIES OF DAILY LIVING (ADL)
RECENT_DECLINE_ADL: NO
ADL_SCORE: 12
ADL_BEFORE_ADMISSION: INDEPENDENT
ADL_SHORT_OF_BREATH: NO
NEEDS_ASSIST: NO
SENSORY_SUPPORT_DEVICES: EYEGLASSES

## 2022-08-01 ASSESSMENT — PATIENT HEALTH QUESTIONNAIRE - PHQ9
CLINICAL INTERPRETATION OF PHQ2 SCORE: NO FURTHER SCREENING NEEDED
1. LITTLE INTEREST OR PLEASURE IN DOING THINGS: NOT AT ALL
SUM OF ALL RESPONSES TO PHQ9 QUESTIONS 1 AND 2: 0
2. FEELING DOWN, DEPRESSED OR HOPELESS: NOT AT ALL
SUM OF ALL RESPONSES TO PHQ9 QUESTIONS 1 AND 2: 0

## 2022-08-01 ASSESSMENT — MINI COG
PATIENT ABLE TO FILL IN THE CLOCK FACE WITH 10 MINUTES PAST 11 O'CLOCK?: YES, CLOCK IS CORRECT
PATIENT WAS GIVEN REPEAT BACK WORDS FROM VERSION: 1 - BANANA SUNRISE CHAIR
TOTAL SCORE: 5
PATIENT ABLE TO REPEAT THE 3 WORDS GIVEN PREVIOUSLY?: WAS ABLE TO REPEAT BACK 3 WORDS CORRECTLY

## 2022-08-10 ENCOUNTER — DOCUMENTATION (OUTPATIENT)
Dept: INTERNAL MEDICINE | Age: 57
End: 2022-08-10

## 2022-08-12 DIAGNOSIS — C78.6 MALIGNANT PSEUDOMYXOMA PERITONEI (CMD): ICD-10-CM

## 2022-08-12 RX ORDER — OXYCODONE HCL 40 MG/1
40 TABLET, FILM COATED, EXTENDED RELEASE ORAL EVERY 12 HOURS SCHEDULED
Qty: 60 TABLET | Refills: 0 | Status: CANCELLED | OUTPATIENT
Start: 2022-08-12 | End: 2023-04-19

## 2022-08-15 RX ORDER — OXYCODONE HCL 40 MG/1
40 TABLET, FILM COATED, EXTENDED RELEASE ORAL EVERY 12 HOURS SCHEDULED
Qty: 60 TABLET | Refills: 0 | Status: SHIPPED | OUTPATIENT
Start: 2022-08-15 | End: 2022-09-15 | Stop reason: SDUPTHER

## 2022-09-15 ENCOUNTER — TELEPHONE (OUTPATIENT)
Dept: SCHEDULING | Age: 57
End: 2022-09-15

## 2022-09-15 DIAGNOSIS — C78.6 MALIGNANT PSEUDOMYXOMA PERITONEI (CMD): ICD-10-CM

## 2022-09-16 RX ORDER — OXYCODONE HCL 40 MG/1
40 TABLET, FILM COATED, EXTENDED RELEASE ORAL EVERY 12 HOURS SCHEDULED
Qty: 60 TABLET | Refills: 0 | Status: SHIPPED | OUTPATIENT
Start: 2022-09-16 | End: 2023-06-05 | Stop reason: ALTCHOICE

## 2022-09-21 ENCOUNTER — TELEPHONE (OUTPATIENT)
Dept: SCHEDULING | Age: 57
End: 2022-09-21

## 2022-10-11 ENCOUNTER — HOSPITAL ENCOUNTER (OUTPATIENT)
Dept: CT IMAGING | Facility: HOSPITAL | Age: 57
Discharge: HOME | End: 2022-10-11
Attending: SURGERY
Payer: MEDICARE

## 2022-10-11 ENCOUNTER — LAB ENCOUNTER (OUTPATIENT)
Dept: LAB | Facility: HOSPITAL | Age: 57
End: 2022-10-11
Attending: SURGERY
Payer: MEDICARE

## 2022-10-11 DIAGNOSIS — C78.6 PSEUDOMYXOMA PERITONEI (HCC): ICD-10-CM

## 2022-10-11 LAB
CANCER AG125 SERPL-ACNC: 4 U/ML (ref ?–35)
CANCER AG19-9 SERPL-ACNC: 8.9 U/ML (ref ?–37)
CEA SERPL-MCNC: 2.6 NG/ML (ref ?–5)
CREAT BLD-MCNC: 1.4 MG/DL
GFR SERPLBLD BASED ON 1.73 SQ M-ARVRAT: 58 ML/MIN/1.73M2 (ref 60–?)

## 2022-10-11 PROCEDURE — 36415 COLL VENOUS BLD VENIPUNCTURE: CPT

## 2022-10-11 PROCEDURE — 74177 CT ABD & PELVIS W/CONTRAST: CPT | Performed by: SURGERY

## 2022-10-11 PROCEDURE — 82565 ASSAY OF CREATININE: CPT

## 2022-10-11 PROCEDURE — 86301 IMMUNOASSAY TUMOR CA 19-9: CPT

## 2022-10-11 PROCEDURE — 82378 CARCINOEMBRYONIC ANTIGEN: CPT

## 2022-10-11 PROCEDURE — 86304 IMMUNOASSAY TUMOR CA 125: CPT

## 2022-10-11 RX ORDER — IOHEXOL 350 MG/ML
100 INJECTION, SOLUTION INTRAVENOUS
Status: COMPLETED | OUTPATIENT
Start: 2022-10-11 | End: 2022-10-11

## 2022-10-11 RX ADMIN — IOHEXOL 100 ML: 350 INJECTION, SOLUTION INTRAVENOUS at 07:35:00

## 2022-10-12 ENCOUNTER — TELEPHONE (OUTPATIENT)
Dept: SURGERY | Facility: CLINIC | Age: 57
End: 2022-10-12

## 2022-10-18 DIAGNOSIS — C78.6 MALIGNANT PSEUDOMYXOMA PERITONEI (CMD): ICD-10-CM

## 2022-10-18 RX ORDER — OXYCODONE HCL 40 MG/1
40 TABLET, FILM COATED, EXTENDED RELEASE ORAL EVERY 12 HOURS SCHEDULED
Qty: 60 TABLET | Refills: 0 | Status: SHIPPED | OUTPATIENT
Start: 2022-10-18 | End: 2022-10-27 | Stop reason: SDUPTHER

## 2022-10-26 ENCOUNTER — EXTERNAL RECORD (OUTPATIENT)
Dept: HEALTH INFORMATION MANAGEMENT | Facility: OTHER | Age: 57
End: 2022-10-26

## 2022-10-26 ENCOUNTER — OFFICE VISIT (OUTPATIENT)
Dept: SURGERY | Facility: CLINIC | Age: 57
End: 2022-10-26
Payer: MEDICARE

## 2022-10-26 ENCOUNTER — LAB ENCOUNTER (OUTPATIENT)
Dept: LAB | Facility: HOSPITAL | Age: 57
End: 2022-10-26
Attending: INTERNAL MEDICINE
Payer: MEDICARE

## 2022-10-26 VITALS
TEMPERATURE: 98 F | SYSTOLIC BLOOD PRESSURE: 157 MMHG | HEART RATE: 60 BPM | DIASTOLIC BLOOD PRESSURE: 84 MMHG | OXYGEN SATURATION: 94 % | WEIGHT: 238 LBS | RESPIRATION RATE: 16 BRPM | BODY MASS INDEX: 31 KG/M2

## 2022-10-26 DIAGNOSIS — N18.2 CHRONIC RENAL IMPAIRMENT, STAGE 2 (MILD): Primary | ICD-10-CM

## 2022-10-26 DIAGNOSIS — Z87.39 HISTORY OF GOUT: ICD-10-CM

## 2022-10-26 DIAGNOSIS — C78.6 PSEUDOMYXOMA PERITONEI (HCC): Primary | ICD-10-CM

## 2022-10-26 PROBLEM — K56.609 SBO (SMALL BOWEL OBSTRUCTION) (HCC): Status: RESOLVED | Noted: 2019-12-26 | Resolved: 2022-10-26

## 2022-10-26 LAB
ALBUMIN SERPL-MCNC: 3.9 G/DL (ref 3.4–5)
ALP LIVER SERPL-CCNC: 78 U/L
ALT SERPL-CCNC: 32 U/L
AST SERPL-CCNC: 22 U/L (ref 15–37)
BASOPHILS # BLD AUTO: 0.07 X10(3) UL (ref 0–0.2)
BASOPHILS NFR BLD AUTO: 0.7 %
BILIRUB DIRECT SERPL-MCNC: 0.1 MG/DL (ref 0–0.2)
BILIRUB SERPL-MCNC: 0.4 MG/DL (ref 0.1–2)
BUN BLD-MCNC: 12 MG/DL (ref 7–18)
CREAT BLD-MCNC: 1.34 MG/DL
EOSINOPHIL # BLD AUTO: 0.42 X10(3) UL (ref 0–0.7)
EOSINOPHIL NFR BLD AUTO: 4 %
ERYTHROCYTE [DISTWIDTH] IN BLOOD BY AUTOMATED COUNT: 12.3 %
ERYTHROCYTE [SEDIMENTATION RATE] IN BLOOD: 21 MM/HR
GFR SERPLBLD BASED ON 1.73 SQ M-ARVRAT: 61 ML/MIN/1.73M2 (ref 60–?)
HCT VFR BLD AUTO: 48.9 %
HGB BLD-MCNC: 16.4 G/DL
IMM GRANULOCYTES # BLD AUTO: 0.02 X10(3) UL (ref 0–1)
IMM GRANULOCYTES NFR BLD: 0.2 %
LYMPHOCYTES # BLD AUTO: 3.5 X10(3) UL (ref 1–4)
LYMPHOCYTES NFR BLD AUTO: 33.3 %
MCH RBC QN AUTO: 32.9 PG (ref 26–34)
MCHC RBC AUTO-ENTMCNC: 33.5 G/DL (ref 31–37)
MCV RBC AUTO: 98 FL
MONOCYTES # BLD AUTO: 0.93 X10(3) UL (ref 0.1–1)
MONOCYTES NFR BLD AUTO: 8.9 %
NEUTROPHILS # BLD AUTO: 5.56 X10 (3) UL (ref 1.5–7.7)
NEUTROPHILS # BLD AUTO: 5.56 X10(3) UL (ref 1.5–7.7)
NEUTROPHILS NFR BLD AUTO: 52.9 %
PLATELET # BLD AUTO: 291 10(3)UL (ref 150–450)
PROT SERPL-MCNC: 7.8 G/DL (ref 6.4–8.2)
RBC # BLD AUTO: 4.99 X10(6)UL
URATE SERPL-MCNC: 4.5 MG/DL
WBC # BLD AUTO: 10.5 X10(3) UL (ref 4–11)

## 2022-10-26 PROCEDURE — 85652 RBC SED RATE AUTOMATED: CPT

## 2022-10-26 PROCEDURE — 36415 COLL VENOUS BLD VENIPUNCTURE: CPT

## 2022-10-26 PROCEDURE — 80076 HEPATIC FUNCTION PANEL: CPT

## 2022-10-26 PROCEDURE — 82565 ASSAY OF CREATININE: CPT

## 2022-10-26 PROCEDURE — 84550 ASSAY OF BLOOD/URIC ACID: CPT

## 2022-10-26 PROCEDURE — 84520 ASSAY OF UREA NITROGEN: CPT

## 2022-10-26 PROCEDURE — 99213 OFFICE O/P EST LOW 20 MIN: CPT | Performed by: SURGERY

## 2022-10-26 PROCEDURE — 85025 COMPLETE CBC W/AUTO DIFF WBC: CPT

## 2022-10-27 DIAGNOSIS — C78.6 MALIGNANT PSEUDOMYXOMA PERITONEI (CMD): ICD-10-CM

## 2022-10-28 RX ORDER — OXYCODONE HCL 40 MG/1
40 TABLET, FILM COATED, EXTENDED RELEASE ORAL EVERY 12 HOURS SCHEDULED
Qty: 60 TABLET | Refills: 0 | Status: SHIPPED | OUTPATIENT
Start: 2022-10-28 | End: 2022-11-23 | Stop reason: SDUPTHER

## 2022-11-23 DIAGNOSIS — C78.6 MALIGNANT PSEUDOMYXOMA PERITONEI (CMD): ICD-10-CM

## 2022-11-24 RX ORDER — OXYCODONE HCL 40 MG/1
40 TABLET, FILM COATED, EXTENDED RELEASE ORAL EVERY 12 HOURS SCHEDULED
Qty: 60 TABLET | Refills: 0 | Status: SHIPPED | OUTPATIENT
Start: 2022-11-24 | End: 2022-11-30 | Stop reason: SDUPTHER

## 2022-11-30 ENCOUNTER — TELEPHONE (OUTPATIENT)
Dept: SCHEDULING | Age: 57
End: 2022-11-30

## 2022-11-30 DIAGNOSIS — C78.6 MALIGNANT PSEUDOMYXOMA PERITONEI (CMD): ICD-10-CM

## 2022-11-30 RX ORDER — OXYCODONE HCL 40 MG/1
40 TABLET, FILM COATED, EXTENDED RELEASE ORAL EVERY 12 HOURS SCHEDULED
Qty: 60 TABLET | Refills: 0 | Status: SHIPPED | OUTPATIENT
Start: 2022-11-30 | End: 2022-12-26 | Stop reason: SDUPTHER

## 2022-12-26 DIAGNOSIS — C78.6 MALIGNANT PSEUDOMYXOMA PERITONEI (CMD): ICD-10-CM

## 2022-12-27 RX ORDER — OXYCODONE HCL 40 MG/1
40 TABLET, FILM COATED, EXTENDED RELEASE ORAL EVERY 12 HOURS SCHEDULED
Qty: 60 TABLET | Refills: 0 | Status: SHIPPED | OUTPATIENT
Start: 2022-12-27 | End: 2023-01-24 | Stop reason: SDUPTHER

## 2023-01-24 DIAGNOSIS — C78.6 MALIGNANT PSEUDOMYXOMA PERITONEI (CMD): ICD-10-CM

## 2023-01-24 RX ORDER — OXYCODONE HCL 40 MG/1
40 TABLET, FILM COATED, EXTENDED RELEASE ORAL EVERY 12 HOURS SCHEDULED
Qty: 60 TABLET | Refills: 0 | Status: SHIPPED | OUTPATIENT
Start: 2023-01-24 | End: 2023-02-24 | Stop reason: SDUPTHER

## 2023-01-24 RX ORDER — OXYCODONE HCL 40 MG/1
40 TABLET, FILM COATED, EXTENDED RELEASE ORAL EVERY 12 HOURS SCHEDULED
Qty: 60 TABLET | Refills: 0 | Status: CANCELLED | OUTPATIENT
Start: 2023-01-24 | End: 2023-02-23

## 2023-02-24 DIAGNOSIS — C78.6 MALIGNANT PSEUDOMYXOMA PERITONEI (CMD): ICD-10-CM

## 2023-02-24 DIAGNOSIS — Z13.29 SCREENING FOR THYROID DISORDER: ICD-10-CM

## 2023-02-24 DIAGNOSIS — F11.20 OPIOID TYPE DEPENDENCE, CONTINUOUS (CMD): Primary | ICD-10-CM

## 2023-02-24 DIAGNOSIS — M1A.9XX0 CHRONIC GOUT WITHOUT TOPHUS, UNSPECIFIED CAUSE, UNSPECIFIED SITE: ICD-10-CM

## 2023-02-24 DIAGNOSIS — Z13.220 SCREENING FOR CHOLESTEROL LEVEL: ICD-10-CM

## 2023-02-24 RX ORDER — NALOXONE HYDROCHLORIDE 4 MG/.1ML
SPRAY NASAL
Qty: 2 EACH | Refills: 1 | Status: SHIPPED | OUTPATIENT
Start: 2023-02-24

## 2023-02-24 RX ORDER — OXYCODONE HCL 40 MG/1
40 TABLET, FILM COATED, EXTENDED RELEASE ORAL EVERY 12 HOURS SCHEDULED
Qty: 60 TABLET | Refills: 0 | Status: SHIPPED | OUTPATIENT
Start: 2023-02-24 | End: 2023-03-29 | Stop reason: SDUPTHER

## 2023-03-03 ENCOUNTER — OFFICE VISIT (OUTPATIENT)
Dept: INTERNAL MEDICINE | Age: 58
End: 2023-03-03

## 2023-03-03 VITALS
SYSTOLIC BLOOD PRESSURE: 132 MMHG | HEIGHT: 73 IN | WEIGHT: 240.74 LBS | DIASTOLIC BLOOD PRESSURE: 82 MMHG | TEMPERATURE: 98.4 F | OXYGEN SATURATION: 97 % | BODY MASS INDEX: 31.91 KG/M2 | HEART RATE: 74 BPM | RESPIRATION RATE: 18 BRPM

## 2023-03-03 DIAGNOSIS — C78.6 MALIGNANT PSEUDOMYXOMA PERITONEI (CMD): ICD-10-CM

## 2023-03-03 PROCEDURE — 99214 OFFICE O/P EST MOD 30 MIN: CPT | Performed by: INTERNAL MEDICINE

## 2023-03-03 ASSESSMENT — PATIENT HEALTH QUESTIONNAIRE - PHQ9
CLINICAL INTERPRETATION OF PHQ2 SCORE: NO FURTHER SCREENING NEEDED
2. FEELING DOWN, DEPRESSED OR HOPELESS: NOT AT ALL
SUM OF ALL RESPONSES TO PHQ9 QUESTIONS 1 AND 2: 0
SUM OF ALL RESPONSES TO PHQ9 QUESTIONS 1 AND 2: 0
1. LITTLE INTEREST OR PLEASURE IN DOING THINGS: NOT AT ALL

## 2023-03-03 ASSESSMENT — ENCOUNTER SYMPTOMS
SHORTNESS OF BREATH: 0
BACK PAIN: 1
CONSTIPATION: 0

## 2023-03-03 ASSESSMENT — PAIN SCALES - GENERAL: PAINLEVEL: 2

## 2023-03-29 ENCOUNTER — TELEPHONE (OUTPATIENT)
Dept: INTERNAL MEDICINE | Age: 58
End: 2023-03-29

## 2023-03-29 DIAGNOSIS — C78.6 MALIGNANT PSEUDOMYXOMA PERITONEI (CMD): ICD-10-CM

## 2023-03-29 RX ORDER — OXYCODONE HCL 40 MG/1
40 TABLET, FILM COATED, EXTENDED RELEASE ORAL EVERY 12 HOURS SCHEDULED
Qty: 60 TABLET | Refills: 0 | Status: SHIPPED | OUTPATIENT
Start: 2023-03-29 | End: 2023-04-26 | Stop reason: SDUPTHER

## 2023-04-06 ENCOUNTER — LAB ENCOUNTER (OUTPATIENT)
Dept: LAB | Facility: HOSPITAL | Age: 58
End: 2023-04-06
Attending: INTERNAL MEDICINE
Payer: MEDICARE

## 2023-04-06 ENCOUNTER — EXTERNAL LAB (OUTPATIENT)
Dept: INTERNAL MEDICINE | Age: 58
End: 2023-04-06

## 2023-04-06 DIAGNOSIS — C78.6 MALIGNANT PSEUDOMYXOMA PERITONEI (HCC): Primary | ICD-10-CM

## 2023-04-06 DIAGNOSIS — F11.20 OPIOID TYPE DEPENDENCE, CONTINUOUS (HCC): ICD-10-CM

## 2023-04-06 LAB
LAB RESULT: NORMAL
LAB RESULT: NORMAL

## 2023-04-06 PROCEDURE — 80365 DRUG SCREENING OXYCODONE: CPT

## 2023-04-06 PROCEDURE — 80361 OPIATES 1 OR MORE: CPT

## 2023-04-10 DIAGNOSIS — C78.6 PSEUDOMYXOMA PERITONEI (HCC): Primary | ICD-10-CM

## 2023-04-10 DIAGNOSIS — C7A.020 MALIGNANT CARCINOID TUMOR OF THE APPENDIX (HCC): ICD-10-CM

## 2023-04-10 DIAGNOSIS — D37.9 NEOPLASM OF UNCERTAIN BEHAVIOR OF DIGESTIVE ORGAN, UNSPECIFIED: ICD-10-CM

## 2023-04-10 DIAGNOSIS — C18.1 MALIGNANT NEOPLASM OF APPENDIX (HCC): ICD-10-CM

## 2023-04-10 DIAGNOSIS — C48.1 MALIGNANT NEOPLASM OF SPECIFIED PARTS OF PERITONEUM (HCC): ICD-10-CM

## 2023-04-11 LAB — OPIATES CLASS UR: NEGATIVE NG/ML

## 2023-04-13 ENCOUNTER — LAB ENCOUNTER (OUTPATIENT)
Dept: LAB | Facility: HOSPITAL | Age: 58
End: 2023-04-13
Attending: SURGERY
Payer: MEDICARE

## 2023-04-13 ENCOUNTER — HOSPITAL ENCOUNTER (OUTPATIENT)
Dept: CT IMAGING | Facility: HOSPITAL | Age: 58
Discharge: HOME | End: 2023-04-13
Attending: PHYSICIAN ASSISTANT
Payer: MEDICARE

## 2023-04-13 DIAGNOSIS — C7A.020 MALIGNANT CARCINOID TUMOR OF THE APPENDIX (HCC): ICD-10-CM

## 2023-04-13 DIAGNOSIS — C78.6 PSEUDOMYXOMA PERITONEI (HCC): ICD-10-CM

## 2023-04-13 DIAGNOSIS — D37.9 NEOPLASM OF UNCERTAIN BEHAVIOR OF DIGESTIVE ORGAN, UNSPECIFIED: ICD-10-CM

## 2023-04-13 DIAGNOSIS — C18.1 MALIGNANT NEOPLASM OF APPENDIX (HCC): ICD-10-CM

## 2023-04-13 DIAGNOSIS — C48.1 MALIGNANT NEOPLASM OF SPECIFIED PARTS OF PERITONEUM (HCC): ICD-10-CM

## 2023-04-13 DIAGNOSIS — N18.2 CHRONIC KIDNEY DISEASE, STAGE II (MILD): Primary | ICD-10-CM

## 2023-04-13 DIAGNOSIS — Z87.39 HISTORY OF GOUT: ICD-10-CM

## 2023-04-13 LAB
ALBUMIN SERPL-MCNC: 3.9 G/DL (ref 3.4–5)
ALP LIVER SERPL-CCNC: 92 U/L
ALT SERPL-CCNC: 29 U/L
AST SERPL-CCNC: 20 U/L (ref 15–37)
BASOPHILS # BLD AUTO: 0.06 X10(3) UL (ref 0–0.2)
BASOPHILS NFR BLD AUTO: 0.6 %
BILIRUB DIRECT SERPL-MCNC: 0.1 MG/DL (ref 0–0.2)
BILIRUB SERPL-MCNC: 0.4 MG/DL (ref 0.1–2)
BUN BLD-MCNC: 13 MG/DL (ref 7–18)
CANCER AG125 SERPL-ACNC: 2.8 U/ML (ref ?–35)
CANCER AG19-9 SERPL-ACNC: 8.5 U/ML (ref ?–37)
CEA SERPL-MCNC: 2.7 NG/ML (ref ?–5)
CREAT BLD-MCNC: 1.33 MG/DL
CREAT BLD-MCNC: 1.4 MG/DL
EOSINOPHIL # BLD AUTO: 0.42 X10(3) UL (ref 0–0.7)
EOSINOPHIL NFR BLD AUTO: 4.3 %
ERYTHROCYTE [DISTWIDTH] IN BLOOD BY AUTOMATED COUNT: 12.4 %
ERYTHROCYTE [SEDIMENTATION RATE] IN BLOOD: 39 MM/HR
GFR SERPLBLD BASED ON 1.73 SQ M-ARVRAT: 58 ML/MIN/1.73M2 (ref 60–?)
GFR SERPLBLD BASED ON 1.73 SQ M-ARVRAT: 62 ML/MIN/1.73M2 (ref 60–?)
HCT VFR BLD AUTO: 48.8 %
HGB BLD-MCNC: 16.3 G/DL
IMM GRANULOCYTES # BLD AUTO: 0.02 X10(3) UL (ref 0–1)
IMM GRANULOCYTES NFR BLD: 0.2 %
LYMPHOCYTES # BLD AUTO: 2.76 X10(3) UL (ref 1–4)
LYMPHOCYTES NFR BLD AUTO: 28.2 %
MCH RBC QN AUTO: 31.5 PG (ref 26–34)
MCHC RBC AUTO-ENTMCNC: 33.4 G/DL (ref 31–37)
MCV RBC AUTO: 94.4 FL
MONOCYTES # BLD AUTO: 1.11 X10(3) UL (ref 0.1–1)
MONOCYTES NFR BLD AUTO: 11.3 %
NEUTROPHILS # BLD AUTO: 5.43 X10 (3) UL (ref 1.5–7.7)
NEUTROPHILS # BLD AUTO: 5.43 X10(3) UL (ref 1.5–7.7)
NEUTROPHILS NFR BLD AUTO: 55.4 %
PLATELET # BLD AUTO: 293 10(3)UL (ref 150–450)
PROT SERPL-MCNC: 7.8 G/DL (ref 6.4–8.2)
RBC # BLD AUTO: 5.17 X10(6)UL
URATE SERPL-MCNC: 4.9 MG/DL
WBC # BLD AUTO: 9.8 X10(3) UL (ref 4–11)

## 2023-04-13 PROCEDURE — 86304 IMMUNOASSAY TUMOR CA 125: CPT

## 2023-04-13 PROCEDURE — 82565 ASSAY OF CREATININE: CPT

## 2023-04-13 PROCEDURE — 85025 COMPLETE CBC W/AUTO DIFF WBC: CPT

## 2023-04-13 PROCEDURE — 36415 COLL VENOUS BLD VENIPUNCTURE: CPT

## 2023-04-13 PROCEDURE — 86301 IMMUNOASSAY TUMOR CA 19-9: CPT

## 2023-04-13 PROCEDURE — 85652 RBC SED RATE AUTOMATED: CPT

## 2023-04-13 PROCEDURE — 84520 ASSAY OF UREA NITROGEN: CPT

## 2023-04-13 PROCEDURE — 74177 CT ABD & PELVIS W/CONTRAST: CPT | Performed by: PHYSICIAN ASSISTANT

## 2023-04-13 PROCEDURE — 84550 ASSAY OF BLOOD/URIC ACID: CPT

## 2023-04-13 PROCEDURE — 80076 HEPATIC FUNCTION PANEL: CPT

## 2023-04-13 PROCEDURE — 82378 CARCINOEMBRYONIC ANTIGEN: CPT

## 2023-04-26 ENCOUNTER — OFFICE VISIT (OUTPATIENT)
Dept: SURGERY | Facility: CLINIC | Age: 58
End: 2023-04-26
Payer: MEDICARE

## 2023-04-26 VITALS
WEIGHT: 240 LBS | HEART RATE: 79 BPM | OXYGEN SATURATION: 96 % | BODY MASS INDEX: 32 KG/M2 | SYSTOLIC BLOOD PRESSURE: 124 MMHG | DIASTOLIC BLOOD PRESSURE: 82 MMHG | RESPIRATION RATE: 16 BRPM | TEMPERATURE: 98 F

## 2023-04-26 DIAGNOSIS — C78.6 PSEUDOMYXOMA PERITONEI (HCC): Primary | ICD-10-CM

## 2023-04-26 DIAGNOSIS — C78.6 MALIGNANT PSEUDOMYXOMA PERITONEI (CMD): ICD-10-CM

## 2023-04-26 PROCEDURE — 99213 OFFICE O/P EST LOW 20 MIN: CPT | Performed by: SURGERY

## 2023-04-26 RX ORDER — OXYCODONE HCL 40 MG/1
40 TABLET, FILM COATED, EXTENDED RELEASE ORAL EVERY 12 HOURS SCHEDULED
Qty: 60 TABLET | Refills: 0 | Status: SHIPPED | OUTPATIENT
Start: 2023-04-26 | End: 2023-06-05 | Stop reason: SDUPTHER

## 2023-06-03 DIAGNOSIS — C78.6 MALIGNANT PSEUDOMYXOMA PERITONEI (CMD): ICD-10-CM

## 2023-06-03 RX ORDER — OXYCODONE HCL 40 MG/1
40 TABLET, FILM COATED, EXTENDED RELEASE ORAL EVERY 12 HOURS SCHEDULED
Qty: 60 TABLET | Refills: 0 | OUTPATIENT
Start: 2023-06-03 | End: 2024-02-08

## 2023-06-05 ENCOUNTER — OFFICE VISIT (OUTPATIENT)
Dept: INTERNAL MEDICINE | Age: 58
End: 2023-06-05

## 2023-06-05 VITALS
HEIGHT: 73 IN | HEART RATE: 82 BPM | BODY MASS INDEX: 31.53 KG/M2 | WEIGHT: 237.88 LBS | RESPIRATION RATE: 16 BRPM | DIASTOLIC BLOOD PRESSURE: 87 MMHG | SYSTOLIC BLOOD PRESSURE: 132 MMHG | TEMPERATURE: 98.6 F | OXYGEN SATURATION: 93 %

## 2023-06-05 DIAGNOSIS — Z11.59 NEED FOR HEPATITIS C SCREENING TEST: Primary | ICD-10-CM

## 2023-06-05 DIAGNOSIS — Z23 NEED FOR SHINGLES VACCINE: ICD-10-CM

## 2023-06-05 DIAGNOSIS — C78.6 MALIGNANT PSEUDOMYXOMA PERITONEI (CMD): ICD-10-CM

## 2023-06-05 PROCEDURE — 99213 OFFICE O/P EST LOW 20 MIN: CPT | Performed by: INTERNAL MEDICINE

## 2023-06-05 RX ORDER — OXYCODONE HCL 40 MG/1
40 TABLET, FILM COATED, EXTENDED RELEASE ORAL EVERY 12 HOURS SCHEDULED
Qty: 60 TABLET | Refills: 0 | Status: SHIPPED | OUTPATIENT
Start: 2023-06-05 | End: 2023-06-14 | Stop reason: SDUPTHER

## 2023-06-05 RX ORDER — ACETAMINOPHEN 500 MG
1000 TABLET ORAL
COMMUNITY

## 2023-06-05 ASSESSMENT — PATIENT HEALTH QUESTIONNAIRE - PHQ9
1. LITTLE INTEREST OR PLEASURE IN DOING THINGS: NOT AT ALL
CLINICAL INTERPRETATION OF PHQ2 SCORE: NO FURTHER SCREENING NEEDED
2. FEELING DOWN, DEPRESSED OR HOPELESS: NOT AT ALL
SUM OF ALL RESPONSES TO PHQ9 QUESTIONS 1 AND 2: 0
SUM OF ALL RESPONSES TO PHQ9 QUESTIONS 1 AND 2: 0

## 2023-06-05 ASSESSMENT — ENCOUNTER SYMPTOMS: ABDOMINAL PAIN: 1

## 2023-06-05 ASSESSMENT — PAIN SCALES - GENERAL: PAINLEVEL: 2

## 2023-06-14 DIAGNOSIS — C78.6 MALIGNANT PSEUDOMYXOMA PERITONEI (CMD): ICD-10-CM

## 2023-06-14 RX ORDER — OXYCODONE HCL 40 MG/1
40 TABLET, FILM COATED, EXTENDED RELEASE ORAL EVERY 12 HOURS SCHEDULED
Qty: 60 TABLET | Refills: 0 | Status: SHIPPED | OUTPATIENT
Start: 2023-06-14 | End: 2023-06-15 | Stop reason: SDUPTHER

## 2023-06-15 ENCOUNTER — E-ADVICE (OUTPATIENT)
Dept: INTERNAL MEDICINE | Age: 58
End: 2023-06-15

## 2023-06-15 DIAGNOSIS — C78.6 MALIGNANT PSEUDOMYXOMA PERITONEI (CMD): ICD-10-CM

## 2023-06-15 RX ORDER — OXYCODONE HCL 40 MG/1
40 TABLET, FILM COATED, EXTENDED RELEASE ORAL EVERY 12 HOURS SCHEDULED
Qty: 60 TABLET | Refills: 0 | Status: SHIPPED | OUTPATIENT
Start: 2023-06-15 | End: 2023-07-13 | Stop reason: SDUPTHER

## 2023-07-10 ENCOUNTER — TELEPHONE (OUTPATIENT)
Dept: INTERNAL MEDICINE | Age: 58
End: 2023-07-10

## 2023-07-13 ENCOUNTER — OFFICE VISIT (OUTPATIENT)
Dept: INTERNAL MEDICINE | Age: 58
End: 2023-07-13

## 2023-07-13 VITALS
BODY MASS INDEX: 31.94 KG/M2 | TEMPERATURE: 98 F | SYSTOLIC BLOOD PRESSURE: 160 MMHG | WEIGHT: 240.96 LBS | HEIGHT: 73 IN | HEART RATE: 91 BPM | OXYGEN SATURATION: 95 % | DIASTOLIC BLOOD PRESSURE: 96 MMHG

## 2023-07-13 DIAGNOSIS — C78.6 MALIGNANT PSEUDOMYXOMA PERITONEI (CMD): ICD-10-CM

## 2023-07-13 PROCEDURE — 99213 OFFICE O/P EST LOW 20 MIN: CPT | Performed by: INTERNAL MEDICINE

## 2023-07-13 RX ORDER — METHYLPREDNISOLONE 4 MG/1
4 TABLET ORAL SEE ADMIN INSTRUCTIONS
Qty: 21 TABLET | Refills: 0 | Status: SHIPPED | OUTPATIENT
Start: 2023-07-13

## 2023-07-13 RX ORDER — OXYCODONE HCL 40 MG/1
40 TABLET, FILM COATED, EXTENDED RELEASE ORAL EVERY 12 HOURS SCHEDULED
Qty: 60 TABLET | Refills: 0 | Status: SHIPPED | OUTPATIENT
Start: 2023-07-13 | End: 2023-08-11 | Stop reason: SDUPTHER

## 2023-07-13 ASSESSMENT — ENCOUNTER SYMPTOMS: BACK PAIN: 1

## 2023-07-13 ASSESSMENT — PATIENT HEALTH QUESTIONNAIRE - PHQ9
2. FEELING DOWN, DEPRESSED OR HOPELESS: NOT AT ALL
1. LITTLE INTEREST OR PLEASURE IN DOING THINGS: NOT AT ALL
CLINICAL INTERPRETATION OF PHQ2 SCORE: NO FURTHER SCREENING NEEDED
SUM OF ALL RESPONSES TO PHQ9 QUESTIONS 1 AND 2: 0
SUM OF ALL RESPONSES TO PHQ9 QUESTIONS 1 AND 2: 0

## 2023-07-13 ASSESSMENT — PAIN SCALES - GENERAL: PAINLEVEL: 2

## 2023-08-11 DIAGNOSIS — C78.6 MALIGNANT PSEUDOMYXOMA PERITONEI (CMD): ICD-10-CM

## 2023-08-11 RX ORDER — OXYCODONE HCL 40 MG/1
40 TABLET, FILM COATED, EXTENDED RELEASE ORAL EVERY 12 HOURS SCHEDULED
Qty: 60 TABLET | Refills: 0 | Status: SHIPPED | OUTPATIENT
Start: 2023-08-11 | End: 2023-09-11 | Stop reason: SDUPTHER

## 2023-08-11 RX ORDER — OXYCODONE HCL 40 MG/1
40 TABLET, FILM COATED, EXTENDED RELEASE ORAL EVERY 12 HOURS SCHEDULED
Qty: 60 TABLET | Refills: 0 | Status: CANCELLED | OUTPATIENT
Start: 2023-08-11 | End: 2023-09-10

## 2023-09-06 ENCOUNTER — TELEPHONE (OUTPATIENT)
Dept: INTERNAL MEDICINE | Age: 58
End: 2023-09-06

## 2023-09-11 DIAGNOSIS — C78.6 MALIGNANT PSEUDOMYXOMA PERITONEI (CMD): ICD-10-CM

## 2023-09-12 RX ORDER — OXYCODONE HCL 40 MG/1
40 TABLET, FILM COATED, EXTENDED RELEASE ORAL EVERY 12 HOURS SCHEDULED
Qty: 60 TABLET | Refills: 0 | Status: SHIPPED | OUTPATIENT
Start: 2023-09-12 | End: 2023-10-18 | Stop reason: SDUPTHER

## 2023-10-05 ENCOUNTER — TELEPHONE (OUTPATIENT)
Dept: INTERNAL MEDICINE | Age: 58
End: 2023-10-05

## 2023-10-12 ENCOUNTER — E-ADVICE (OUTPATIENT)
Dept: OTHER | Age: 58
End: 2023-10-12

## 2023-10-18 DIAGNOSIS — C78.6 MALIGNANT PSEUDOMYXOMA PERITONEI (CMD): ICD-10-CM

## 2023-10-18 RX ORDER — OXYCODONE HCL 40 MG/1
40 TABLET, FILM COATED, EXTENDED RELEASE ORAL EVERY 12 HOURS SCHEDULED
Qty: 60 TABLET | Refills: 0 | Status: SHIPPED | OUTPATIENT
Start: 2023-10-18 | End: 2023-11-17

## 2023-10-20 ENCOUNTER — LAB ENCOUNTER (OUTPATIENT)
Dept: LAB | Facility: HOSPITAL | Age: 58
End: 2023-10-20
Attending: SURGERY

## 2023-10-20 ENCOUNTER — HOSPITAL ENCOUNTER (OUTPATIENT)
Dept: CT IMAGING | Facility: HOSPITAL | Age: 58
Discharge: HOME | End: 2023-10-20
Attending: SURGERY

## 2023-10-20 DIAGNOSIS — Z87.39 HISTORY OF GOUT: Primary | ICD-10-CM

## 2023-10-20 DIAGNOSIS — M1A.09X0 CHRONIC GOUT OF MULTIPLE SITES: ICD-10-CM

## 2023-10-20 DIAGNOSIS — N18.2 CHRONIC RENAL DISEASE, STAGE II: ICD-10-CM

## 2023-10-20 DIAGNOSIS — C78.6 PSEUDOMYXOMA PERITONEI (HCC): ICD-10-CM

## 2023-10-20 DIAGNOSIS — Z79.899 ENCOUNTER FOR LONG-TERM (CURRENT) USE OF HIGH-RISK MEDICATION: ICD-10-CM

## 2023-10-20 LAB
ALBUMIN SERPL-MCNC: 3.9 G/DL (ref 3.4–5)
ALBUMIN/GLOB SERPL: 1 {RATIO} (ref 1–2)
ALP LIVER SERPL-CCNC: 94 U/L
ALT SERPL-CCNC: 30 U/L
ANION GAP SERPL CALC-SCNC: <0 MMOL/L (ref 0–18)
AST SERPL-CCNC: 16 U/L (ref 15–37)
BASOPHILS # BLD AUTO: 0.06 X10(3) UL (ref 0–0.2)
BASOPHILS NFR BLD AUTO: 0.7 %
BILIRUB SERPL-MCNC: 0.6 MG/DL (ref 0.1–2)
BUN BLD-MCNC: 11 MG/DL (ref 7–18)
CALCIUM BLD-MCNC: 9.7 MG/DL (ref 8.5–10.1)
CANCER AG125 SERPL-ACNC: 2.3 U/ML (ref ?–35)
CANCER AG19-9 SERPL-ACNC: 8.9 U/ML (ref ?–37)
CEA SERPL-MCNC: 2.9 NG/ML (ref ?–5)
CHLORIDE SERPL-SCNC: 109 MMOL/L (ref 98–112)
CO2 SERPL-SCNC: 31 MMOL/L (ref 21–32)
CREAT BLD-MCNC: 1.46 MG/DL
EGFRCR SERPLBLD CKD-EPI 2021: 55 ML/MIN/1.73M2 (ref 60–?)
EOSINOPHIL # BLD AUTO: 0.36 X10(3) UL (ref 0–0.7)
EOSINOPHIL NFR BLD AUTO: 4.4 %
ERYTHROCYTE [DISTWIDTH] IN BLOOD BY AUTOMATED COUNT: 12.2 %
ERYTHROCYTE [SEDIMENTATION RATE] IN BLOOD: 17 MM/HR
FASTING STATUS PATIENT QL REPORTED: YES
GLOBULIN PLAS-MCNC: 4 G/DL (ref 2.8–4.4)
GLUCOSE BLD-MCNC: 128 MG/DL (ref 70–99)
HCT VFR BLD AUTO: 48.8 %
HGB BLD-MCNC: 16.2 G/DL
IMM GRANULOCYTES # BLD AUTO: 0.02 X10(3) UL (ref 0–1)
IMM GRANULOCYTES NFR BLD: 0.2 %
LYMPHOCYTES # BLD AUTO: 2.56 X10(3) UL (ref 1–4)
LYMPHOCYTES NFR BLD AUTO: 31.6 %
MCH RBC QN AUTO: 32.4 PG (ref 26–34)
MCHC RBC AUTO-ENTMCNC: 33.2 G/DL (ref 31–37)
MCV RBC AUTO: 97.6 FL
MONOCYTES # BLD AUTO: 0.87 X10(3) UL (ref 0.1–1)
MONOCYTES NFR BLD AUTO: 10.7 %
NEUTROPHILS # BLD AUTO: 4.23 X10 (3) UL (ref 1.5–7.7)
NEUTROPHILS # BLD AUTO: 4.23 X10(3) UL (ref 1.5–7.7)
NEUTROPHILS NFR BLD AUTO: 52.4 %
OSMOLALITY SERPL CALC.SUM OF ELEC: 289 MOSM/KG (ref 275–295)
PLATELET # BLD AUTO: 283 10(3)UL (ref 150–450)
POTASSIUM SERPL-SCNC: 4.6 MMOL/L (ref 3.5–5.1)
PROT SERPL-MCNC: 7.9 G/DL (ref 6.4–8.2)
RBC # BLD AUTO: 5 X10(6)UL
SODIUM SERPL-SCNC: 139 MMOL/L (ref 136–145)
URATE SERPL-MCNC: 5 MG/DL
WBC # BLD AUTO: 8.1 X10(3) UL (ref 4–11)

## 2023-10-20 PROCEDURE — 86304 IMMUNOASSAY TUMOR CA 125: CPT

## 2023-10-20 PROCEDURE — 80053 COMPREHEN METABOLIC PANEL: CPT

## 2023-10-20 PROCEDURE — 85652 RBC SED RATE AUTOMATED: CPT

## 2023-10-20 PROCEDURE — 74177 CT ABD & PELVIS W/CONTRAST: CPT | Performed by: SURGERY

## 2023-10-20 PROCEDURE — 36415 COLL VENOUS BLD VENIPUNCTURE: CPT

## 2023-10-20 PROCEDURE — 82378 CARCINOEMBRYONIC ANTIGEN: CPT

## 2023-10-20 PROCEDURE — 84550 ASSAY OF BLOOD/URIC ACID: CPT

## 2023-10-20 PROCEDURE — 85025 COMPLETE CBC W/AUTO DIFF WBC: CPT

## 2023-10-20 PROCEDURE — 86301 IMMUNOASSAY TUMOR CA 19-9: CPT

## 2023-10-24 ENCOUNTER — OFFICE VISIT (OUTPATIENT)
Dept: INTERNAL MEDICINE | Age: 58
End: 2023-10-24

## 2023-10-24 VITALS
OXYGEN SATURATION: 93 % | WEIGHT: 247.69 LBS | SYSTOLIC BLOOD PRESSURE: 134 MMHG | RESPIRATION RATE: 18 BRPM | TEMPERATURE: 98 F | BODY MASS INDEX: 32.83 KG/M2 | HEIGHT: 73 IN | HEART RATE: 79 BPM | DIASTOLIC BLOOD PRESSURE: 88 MMHG

## 2023-10-24 DIAGNOSIS — Z00.00 MEDICARE ANNUAL WELLNESS VISIT, INITIAL: Primary | ICD-10-CM

## 2023-10-24 PROCEDURE — 99213 OFFICE O/P EST LOW 20 MIN: CPT | Performed by: INTERNAL MEDICINE

## 2023-10-24 PROCEDURE — G0439 PPPS, SUBSEQ VISIT: HCPCS | Performed by: INTERNAL MEDICINE

## 2023-10-24 RX ORDER — METHYLPREDNISOLONE 4 MG/1
4 TABLET ORAL SEE ADMIN INSTRUCTIONS
Qty: 21 TABLET | Refills: 0 | Status: SHIPPED | OUTPATIENT
Start: 2023-10-24

## 2023-10-24 ASSESSMENT — ACTIVITIES OF DAILY LIVING (ADL)
NEEDS_ASSIST: NO
RECENT_DECLINE_ADL: NO
ADL_SHORT_OF_BREATH: NO
ADL_SCORE: 12
ADL_BEFORE_ADMISSION: INDEPENDENT
SENSORY_SUPPORT_DEVICES: EYEGLASSES

## 2023-10-24 ASSESSMENT — MINI COG
TOTAL SCORE: 5
PATIENT ABLE TO REPEAT THE 3 WORDS GIVEN PREVIOUSLY?: WAS ABLE TO REPEAT BACK 3 WORDS CORRECTLY
PATIENT WAS GIVEN REPEAT BACK WORDS FROM VERSION: 1 - BANANA SUNRISE CHAIR
PATIENT ABLE TO FILL IN THE CLOCK FACE WITH 10 MINUTES PAST 11 O'CLOCK?: YES, CLOCK IS CORRECT

## 2023-10-24 ASSESSMENT — PATIENT HEALTH QUESTIONNAIRE - PHQ9
SUM OF ALL RESPONSES TO PHQ9 QUESTIONS 1 AND 2: 0
1. LITTLE INTEREST OR PLEASURE IN DOING THINGS: NOT AT ALL
CLINICAL INTERPRETATION OF PHQ2 SCORE: NO FURTHER SCREENING NEEDED
2. FEELING DOWN, DEPRESSED OR HOPELESS: NOT AT ALL
SUM OF ALL RESPONSES TO PHQ9 QUESTIONS 1 AND 2: 0

## 2023-10-24 ASSESSMENT — ENCOUNTER SYMPTOMS
ALLERGIC/IMMUNOLOGIC NEGATIVE: 1
GASTROINTESTINAL NEGATIVE: 1
CONSTITUTIONAL NEGATIVE: 1
ENDOCRINE NEGATIVE: 1
EYES NEGATIVE: 1
HEMATOLOGIC/LYMPHATIC NEGATIVE: 1
RESPIRATORY NEGATIVE: 1
PSYCHIATRIC NEGATIVE: 1
NEUROLOGICAL NEGATIVE: 1

## 2023-10-25 ENCOUNTER — EXTERNAL RECORD (OUTPATIENT)
Dept: HEALTH INFORMATION MANAGEMENT | Facility: OTHER | Age: 58
End: 2023-10-25

## 2023-10-25 ENCOUNTER — OFFICE VISIT (OUTPATIENT)
Dept: SURGERY | Facility: CLINIC | Age: 58
End: 2023-10-25

## 2023-10-25 VITALS
SYSTOLIC BLOOD PRESSURE: 154 MMHG | TEMPERATURE: 98 F | HEIGHT: 73 IN | BODY MASS INDEX: 32.63 KG/M2 | WEIGHT: 246.19 LBS | OXYGEN SATURATION: 93 % | RESPIRATION RATE: 16 BRPM | HEART RATE: 78 BPM | DIASTOLIC BLOOD PRESSURE: 88 MMHG

## 2023-10-25 DIAGNOSIS — C48.2 MALIGNANT NEOPLASM OF PERITONEUM, UNSPECIFIED (HCC): ICD-10-CM

## 2023-10-25 DIAGNOSIS — C78.6 PSEUDOMYXOMA PERITONEI (HCC): Primary | ICD-10-CM

## 2023-10-25 DIAGNOSIS — C17.9 MALIGNANT NEOPLASM OF SMALL INTESTINE, UNSPECIFIED (HCC): ICD-10-CM

## 2023-10-25 DIAGNOSIS — D37.6 NEOPLASM OF UNCERTAIN BEHAVIOR OF LIVER, GALLBLADDER AND BILE DUCTS: ICD-10-CM

## 2023-10-25 PROCEDURE — 99213 OFFICE O/P EST LOW 20 MIN: CPT | Performed by: SURGERY

## 2023-10-25 RX ORDER — METHYLPREDNISOLONE 4 MG/1
TABLET ORAL
COMMUNITY
Start: 2023-10-24

## 2023-10-31 ENCOUNTER — OFFICE VISIT (OUTPATIENT)
Dept: INTERNAL MEDICINE | Age: 58
End: 2023-10-31

## 2023-10-31 ENCOUNTER — TELEPHONE (OUTPATIENT)
Dept: INTERNAL MEDICINE | Age: 58
End: 2023-10-31

## 2023-10-31 ENCOUNTER — NURSE TRIAGE (OUTPATIENT)
Dept: TELEHEALTH | Age: 58
End: 2023-10-31

## 2023-10-31 ENCOUNTER — HOSPITAL ENCOUNTER (OUTPATIENT)
Dept: GENERAL RADIOLOGY | Age: 58
Discharge: HOME | End: 2023-10-31
Attending: INTERNAL MEDICINE
Payer: MEDICARE

## 2023-10-31 ENCOUNTER — HOSPITAL ENCOUNTER (OUTPATIENT)
Dept: MRI IMAGING | Age: 58
Discharge: HOME | End: 2023-10-31
Attending: INTERNAL MEDICINE
Payer: MEDICARE

## 2023-10-31 VITALS
TEMPERATURE: 97.1 F | HEART RATE: 87 BPM | DIASTOLIC BLOOD PRESSURE: 101 MMHG | RESPIRATION RATE: 20 BRPM | WEIGHT: 246.36 LBS | SYSTOLIC BLOOD PRESSURE: 175 MMHG | BODY MASS INDEX: 32.65 KG/M2 | OXYGEN SATURATION: 92 %

## 2023-10-31 DIAGNOSIS — M25.551 ACUTE PAIN OF RIGHT HIP: ICD-10-CM

## 2023-10-31 DIAGNOSIS — M54.16 LUMBAR RADICULAR SYNDROME: ICD-10-CM

## 2023-10-31 DIAGNOSIS — M54.10 RADICULAR SYNDROME: ICD-10-CM

## 2023-10-31 DIAGNOSIS — M54.16 LUMBAR RADICULAR SYNDROME: Primary | ICD-10-CM

## 2023-10-31 PROCEDURE — 72148 MRI LUMBAR SPINE W/O DYE: CPT | Performed by: INTERNAL MEDICINE

## 2023-10-31 PROCEDURE — 99213 OFFICE O/P EST LOW 20 MIN: CPT | Performed by: INTERNAL MEDICINE

## 2023-10-31 PROCEDURE — 72110 X-RAY EXAM L-2 SPINE 4/>VWS: CPT | Performed by: INTERNAL MEDICINE

## 2023-10-31 PROCEDURE — 73552 X-RAY EXAM OF FEMUR 2/>: CPT | Performed by: INTERNAL MEDICINE

## 2023-10-31 PROCEDURE — 73502 X-RAY EXAM HIP UNI 2-3 VIEWS: CPT | Performed by: INTERNAL MEDICINE

## 2023-10-31 RX ORDER — METHYLPREDNISOLONE 4 MG/1
4 TABLET ORAL SEE ADMIN INSTRUCTIONS
Qty: 21 TABLET | Refills: 0 | Status: SHIPPED | OUTPATIENT
Start: 2023-10-31

## 2023-10-31 ASSESSMENT — PATIENT HEALTH QUESTIONNAIRE - PHQ9
1. LITTLE INTEREST OR PLEASURE IN DOING THINGS: NOT AT ALL
SUM OF ALL RESPONSES TO PHQ9 QUESTIONS 1 AND 2: 0
2. FEELING DOWN, DEPRESSED OR HOPELESS: NOT AT ALL
CLINICAL INTERPRETATION OF PHQ2 SCORE: NO FURTHER SCREENING NEEDED
SUM OF ALL RESPONSES TO PHQ9 QUESTIONS 1 AND 2: 0

## 2023-10-31 ASSESSMENT — PAIN SCALES - GENERAL: PAINLEVEL: 6

## 2023-11-02 ENCOUNTER — TELEPHONE (OUTPATIENT)
Dept: INTERNAL MEDICINE | Age: 58
End: 2023-11-02

## 2023-11-02 DIAGNOSIS — M54.16 LUMBAR RADICULAR SYNDROME: Primary | ICD-10-CM

## 2023-11-09 DIAGNOSIS — M54.16 LUMBAR RADICULAR SYNDROME: ICD-10-CM

## 2023-11-20 DIAGNOSIS — C78.6 MALIGNANT PSEUDOMYXOMA PERITONEI (CMD): ICD-10-CM

## 2023-11-20 RX ORDER — OXYCODONE HCL 40 MG/1
40 TABLET, FILM COATED, EXTENDED RELEASE ORAL EVERY 12 HOURS SCHEDULED
Qty: 60 TABLET | Refills: 0 | Status: SHIPPED | OUTPATIENT
Start: 2023-11-20 | End: 2023-12-20

## 2023-11-22 ENCOUNTER — OFFICE VISIT (OUTPATIENT)
Dept: PHYSICAL MEDICINE AND REHAB | Facility: CLINIC | Age: 58
End: 2023-11-22
Payer: MEDICARE

## 2023-11-22 VITALS — HEIGHT: 73 IN | OXYGEN SATURATION: 96 % | HEART RATE: 85 BPM | BODY MASS INDEX: 32.6 KG/M2 | WEIGHT: 246 LBS

## 2023-11-22 DIAGNOSIS — M16.11 PRIMARY OSTEOARTHRITIS OF RIGHT HIP: Primary | ICD-10-CM

## 2023-11-22 NOTE — PROCEDURES
Dx: primary osteoarthritis of right hip, femoroacetabular impingement right hip  Procedure: right hip joint steroid injection under US guidance    The patient is here for a right hip injection done under ultrasound guidance. Under ultrasound guidance the right femoral-acetabular joint was identified and a janeen was placed on the patient's skin. The skin was cleaned with betadyne swabs x 3 and anesthetized with 1% lidocaine without epinephrine. Then a 22 gauge needle was inserted under ultrasound guidance into the right femoral-acetabular joint. Aspiration was performed and no blood, fluid, or air was aspirated. The patient was then injected with 5 ml of 1 ml of 40 mg of Kenalog/ml and 4 ml of 1% lidocaine without epinephrine. The needle was removed and a band aid was applied along with triple antibiotic ointment. The patient will follow up in 2 weeks with a phone call. These images are permanently stored in the ultrasound unit or PACS system.     Walker Jansen DO  Physical Medicine and Ramya Dobbins

## 2023-11-24 ENCOUNTER — TELEPHONE (OUTPATIENT)
Dept: PHYSICAL MEDICINE AND REHAB | Facility: CLINIC | Age: 58
End: 2023-11-24

## 2023-11-24 NOTE — TELEPHONE ENCOUNTER
Per CMS Guidelines No Auth required for Primary osteoarthritis of right hip under US guidance.  CPT Code 75396,    Status: Auth not required

## 2023-12-20 DIAGNOSIS — C78.6 MALIGNANT PSEUDOMYXOMA PERITONEI (CMD): ICD-10-CM

## 2023-12-20 RX ORDER — OXYCODONE HCL 40 MG/1
40 TABLET, FILM COATED, EXTENDED RELEASE ORAL EVERY 12 HOURS SCHEDULED
Qty: 60 TABLET | Refills: 0 | Status: SHIPPED | OUTPATIENT
Start: 2023-12-20 | End: 2024-01-19

## 2024-01-24 DIAGNOSIS — C78.6 MALIGNANT PSEUDOMYXOMA PERITONEI (CMD): ICD-10-CM

## 2024-01-24 RX ORDER — OXYCODONE HCL 40 MG/1
40 TABLET, FILM COATED, EXTENDED RELEASE ORAL EVERY 12 HOURS SCHEDULED
Qty: 60 TABLET | Refills: 0 | Status: SHIPPED | OUTPATIENT
Start: 2024-01-24 | End: 2024-02-23

## 2024-02-22 DIAGNOSIS — C78.6 MALIGNANT PSEUDOMYXOMA PERITONEI (CMD): ICD-10-CM

## 2024-02-22 RX ORDER — OXYCODONE HCL 40 MG/1
40 TABLET, FILM COATED, EXTENDED RELEASE ORAL EVERY 12 HOURS SCHEDULED
Qty: 60 TABLET | Refills: 0 | Status: CANCELLED | OUTPATIENT
Start: 2024-02-22 | End: 2024-03-23

## 2024-02-28 ENCOUNTER — TELEPHONE (OUTPATIENT)
Dept: INTERNAL MEDICINE | Age: 59
End: 2024-02-28

## 2024-02-28 DIAGNOSIS — C78.6 MALIGNANT PSEUDOMYXOMA PERITONEI (CMD): ICD-10-CM

## 2024-02-28 RX ORDER — OXYCODONE HCL 40 MG/1
40 TABLET, FILM COATED, EXTENDED RELEASE ORAL EVERY 12 HOURS SCHEDULED
Qty: 60 TABLET | Refills: 0 | Status: SHIPPED | OUTPATIENT
Start: 2024-02-28 | End: 2024-03-29

## 2024-03-27 ENCOUNTER — HOSPITAL ENCOUNTER (INPATIENT)
Facility: HOSPITAL | Age: 59
LOS: 7 days | Discharge: HOME | End: 2024-04-03
Attending: EMERGENCY MEDICINE | Admitting: INTERNAL MEDICINE
Payer: MEDICARE

## 2024-03-27 ENCOUNTER — APPOINTMENT (OUTPATIENT)
Dept: INTERNAL MEDICINE | Age: 59
End: 2024-03-27

## 2024-03-27 ENCOUNTER — APPOINTMENT (OUTPATIENT)
Dept: GENERAL RADIOLOGY | Facility: HOSPITAL | Age: 59
End: 2024-03-27
Attending: EMERGENCY MEDICINE
Payer: MEDICARE

## 2024-03-27 ENCOUNTER — OFFICE VISIT (OUTPATIENT)
Dept: FAMILY MEDICINE CLINIC | Facility: CLINIC | Age: 59
End: 2024-03-27
Payer: MEDICARE

## 2024-03-27 VITALS
HEART RATE: 112 BPM | BODY MASS INDEX: 31.41 KG/M2 | DIASTOLIC BLOOD PRESSURE: 62 MMHG | TEMPERATURE: 100 F | WEIGHT: 237 LBS | HEIGHT: 73 IN | SYSTOLIC BLOOD PRESSURE: 118 MMHG | RESPIRATION RATE: 24 BRPM

## 2024-03-27 DIAGNOSIS — R09.02 HYPOXEMIA: ICD-10-CM

## 2024-03-27 DIAGNOSIS — J10.1 INFLUENZA B: Primary | ICD-10-CM

## 2024-03-27 DIAGNOSIS — R06.03 RESPIRATORY DISTRESS: Primary | ICD-10-CM

## 2024-03-27 DIAGNOSIS — J06.9 UPPER RESPIRATORY TRACT INFECTION, UNSPECIFIED TYPE: ICD-10-CM

## 2024-03-27 PROBLEM — E87.1 HYPONATREMIA: Status: ACTIVE | Noted: 2024-03-27

## 2024-03-27 PROBLEM — R73.9 HYPERGLYCEMIA: Status: ACTIVE | Noted: 2024-03-27

## 2024-03-27 PROBLEM — N17.9 ACUTE KIDNEY INJURY: Status: ACTIVE | Noted: 2024-03-27

## 2024-03-27 PROBLEM — E87.3 METABOLIC ALKALOSIS: Status: ACTIVE | Noted: 2024-03-27

## 2024-03-27 PROBLEM — N17.9 ACUTE KIDNEY INJURY (HCC): Status: ACTIVE | Noted: 2024-03-27

## 2024-03-27 LAB
ALBUMIN SERPL-MCNC: 4.2 G/DL (ref 3.4–5)
ALBUMIN/GLOB SERPL: 1 {RATIO} (ref 1–2)
ALP LIVER SERPL-CCNC: 96 U/L
ALT SERPL-CCNC: 34 U/L
ANION GAP SERPL CALC-SCNC: 8 MMOL/L (ref 0–18)
ARTERIAL PATENCY WRIST A: POSITIVE
AST SERPL-CCNC: 29 U/L (ref 15–37)
BASE EXCESS BLDA CALC-SCNC: -0.9 MMOL/L (ref ?–2)
BASOPHILS # BLD AUTO: 0.01 X10(3) UL (ref 0–0.2)
BASOPHILS NFR BLD AUTO: 0.1 %
BILIRUB SERPL-MCNC: 0.4 MG/DL (ref 0.1–2)
BODY TEMPERATURE: 98.6 F
BUN BLD-MCNC: 18 MG/DL (ref 9–23)
CA-I BLD-SCNC: 1.18 MMOL/L (ref 0.95–1.32)
CALCIUM BLD-MCNC: 9.3 MG/DL (ref 8.5–10.1)
CHLORIDE SERPL-SCNC: 102 MMOL/L (ref 98–112)
CO2 SERPL-SCNC: 26 MMOL/L (ref 21–32)
COHGB MFR BLD: 1.5 % SAT (ref 0–3)
CREAT BLD-MCNC: 2.04 MG/DL
EGFRCR SERPLBLD CKD-EPI 2021: 37 ML/MIN/1.73M2 (ref 60–?)
EOSINOPHIL # BLD AUTO: 0 X10(3) UL (ref 0–0.7)
EOSINOPHIL NFR BLD AUTO: 0 %
ERYTHROCYTE [DISTWIDTH] IN BLOOD BY AUTOMATED COUNT: 12.2 %
FLUAV + FLUBV RNA SPEC NAA+PROBE: NEGATIVE
FLUAV + FLUBV RNA SPEC NAA+PROBE: POSITIVE
GLOBULIN PLAS-MCNC: 4.3 G/DL (ref 2.8–4.4)
GLUCOSE BLD-MCNC: 123 MG/DL (ref 70–99)
HCO3 BLDA-SCNC: 24.2 MEQ/L (ref 21–27)
HCT VFR BLD AUTO: 52.5 %
HGB BLD-MCNC: 16.7 G/DL
HGB BLD-MCNC: 17.5 G/DL
IMM GRANULOCYTES # BLD AUTO: 0.04 X10(3) UL (ref 0–1)
IMM GRANULOCYTES NFR BLD: 0.3 %
L/M: 15 L/MIN
LACTATE BLD-SCNC: 1.6 MMOL/L (ref 0.5–2)
LACTATE SERPL-SCNC: 2 MMOL/L (ref 0.4–2)
LYMPHOCYTES # BLD AUTO: 0.7 X10(3) UL (ref 1–4)
LYMPHOCYTES NFR BLD AUTO: 5.9 %
MCH RBC QN AUTO: 32.3 PG (ref 26–34)
MCHC RBC AUTO-ENTMCNC: 33.3 G/DL (ref 31–37)
MCV RBC AUTO: 96.9 FL
METHGB MFR BLD: 0.5 % SAT (ref 0.4–1.5)
MONOCYTES # BLD AUTO: 0.84 X10(3) UL (ref 0.1–1)
MONOCYTES NFR BLD AUTO: 7.1 %
NEUTROPHILS # BLD AUTO: 10.19 X10 (3) UL (ref 1.5–7.7)
NEUTROPHILS # BLD AUTO: 10.19 X10(3) UL (ref 1.5–7.7)
NEUTROPHILS NFR BLD AUTO: 86.6 %
OSMOLALITY SERPL CALC.SUM OF ELEC: 285 MOSM/KG (ref 275–295)
OXYHGB MFR BLDA: 96.7 % (ref 92–100)
PCO2 BLDA: 41 MM HG (ref 35–45)
PH BLDA: 7.38 [PH] (ref 7.35–7.45)
PLATELET # BLD AUTO: 202 10(3)UL (ref 150–450)
PO2 BLDA: 96 MM HG (ref 80–100)
POTASSIUM BLD-SCNC: 4.5 MMOL/L (ref 3.6–5.1)
POTASSIUM SERPL-SCNC: 4.2 MMOL/L (ref 3.5–5.1)
PROT SERPL-MCNC: 8.5 G/DL (ref 6.4–8.2)
RBC # BLD AUTO: 5.42 X10(6)UL
RSV RNA SPEC NAA+PROBE: NEGATIVE
SARS-COV-2 RNA RESP QL NAA+PROBE: NOT DETECTED
SODIUM BLD-SCNC: 132 MMOL/L (ref 135–145)
SODIUM SERPL-SCNC: 136 MMOL/L (ref 136–145)
WBC # BLD AUTO: 11.8 X10(3) UL (ref 4–11)

## 2024-03-27 PROCEDURE — 99291 CRITICAL CARE FIRST HOUR: CPT | Performed by: NURSE PRACTITIONER

## 2024-03-27 PROCEDURE — 99291 CRITICAL CARE FIRST HOUR: CPT | Performed by: INTERNAL MEDICINE

## 2024-03-27 PROCEDURE — 5A0945A ASSISTANCE WITH RESPIRATORY VENTILATION, 24-96 CONSECUTIVE HOURS, HIGH NASAL FLOW/VELOCITY: ICD-10-PCS | Performed by: INTERNAL MEDICINE

## 2024-03-27 PROCEDURE — 99215 OFFICE O/P EST HI 40 MIN: CPT | Performed by: NURSE PRACTITIONER

## 2024-03-27 PROCEDURE — 71045 X-RAY EXAM CHEST 1 VIEW: CPT | Performed by: EMERGENCY MEDICINE

## 2024-03-27 RX ORDER — OXYCODONE HCL 40 MG/1
40 TABLET, FILM COATED, EXTENDED RELEASE ORAL EVERY 12 HOURS
Status: DISCONTINUED | OUTPATIENT
Start: 2024-03-27 | End: 2024-04-03

## 2024-03-27 RX ORDER — ACETAMINOPHEN 500 MG
500 TABLET ORAL EVERY 4 HOURS PRN
Status: DISCONTINUED | OUTPATIENT
Start: 2024-03-27 | End: 2024-03-27

## 2024-03-27 RX ORDER — GUAIFENESIN 600 MG/1
600 TABLET, EXTENDED RELEASE ORAL 2 TIMES DAILY
Status: DISCONTINUED | OUTPATIENT
Start: 2024-03-27 | End: 2024-04-03

## 2024-03-27 RX ORDER — MELATONIN
3 NIGHTLY PRN
Status: DISCONTINUED | OUTPATIENT
Start: 2024-03-27 | End: 2024-04-03

## 2024-03-27 RX ORDER — SODIUM CHLORIDE 9 MG/ML
INJECTION, SOLUTION INTRAVENOUS CONTINUOUS
Status: ACTIVE | OUTPATIENT
Start: 2024-03-27 | End: 2024-03-28

## 2024-03-27 RX ORDER — HEPARIN SODIUM 5000 [USP'U]/ML
5000 INJECTION, SOLUTION INTRAVENOUS; SUBCUTANEOUS EVERY 8 HOURS SCHEDULED
Status: DISCONTINUED | OUTPATIENT
Start: 2024-03-27 | End: 2024-03-28 | Stop reason: ALTCHOICE

## 2024-03-27 RX ORDER — DOCUSATE SODIUM 100 MG/1
100 CAPSULE, LIQUID FILLED ORAL 2 TIMES DAILY PRN
Status: DISCONTINUED | OUTPATIENT
Start: 2024-03-27 | End: 2024-04-03

## 2024-03-27 RX ORDER — SODIUM CHLORIDE 9 MG/ML
INJECTION, SOLUTION INTRAVENOUS CONTINUOUS
Status: DISCONTINUED | OUTPATIENT
Start: 2024-03-27 | End: 2024-03-27

## 2024-03-27 RX ORDER — ONDANSETRON 2 MG/ML
4 INJECTION INTRAMUSCULAR; INTRAVENOUS EVERY 6 HOURS PRN
Status: DISCONTINUED | OUTPATIENT
Start: 2024-03-27 | End: 2024-04-03

## 2024-03-27 RX ORDER — HYDROMORPHONE HYDROCHLORIDE 1 MG/ML
0.5 INJECTION, SOLUTION INTRAMUSCULAR; INTRAVENOUS; SUBCUTANEOUS EVERY 30 MIN PRN
Status: DISCONTINUED | OUTPATIENT
Start: 2024-03-27 | End: 2024-03-27

## 2024-03-27 RX ORDER — BENZONATATE 200 MG/1
200 CAPSULE ORAL 3 TIMES DAILY PRN
Status: DISCONTINUED | OUTPATIENT
Start: 2024-03-27 | End: 2024-03-27

## 2024-03-27 RX ORDER — ACETAMINOPHEN 500 MG
500 TABLET ORAL EVERY 4 HOURS PRN
Status: DISCONTINUED | OUTPATIENT
Start: 2024-03-27 | End: 2024-04-03

## 2024-03-27 RX ORDER — BENZONATATE 100 MG/1
200 CAPSULE ORAL 3 TIMES DAILY PRN
Status: DISCONTINUED | OUTPATIENT
Start: 2024-03-27 | End: 2024-04-03

## 2024-03-27 RX ORDER — ECHINACEA PURPUREA EXTRACT 125 MG
1 TABLET ORAL
Status: DISCONTINUED | OUTPATIENT
Start: 2024-03-27 | End: 2024-04-03

## 2024-03-27 RX ORDER — BISACODYL 10 MG
10 SUPPOSITORY, RECTAL RECTAL
Status: DISCONTINUED | OUTPATIENT
Start: 2024-03-27 | End: 2024-04-03

## 2024-03-27 RX ORDER — OSELTAMIVIR PHOSPHATE 30 MG/1
30 CAPSULE ORAL EVERY 12 HOURS SCHEDULED
Status: DISCONTINUED | OUTPATIENT
Start: 2024-03-27 | End: 2024-03-28

## 2024-03-27 RX ORDER — FEBUXOSTAT 40 MG/1
80 TABLET, FILM COATED ORAL DAILY
Status: DISCONTINUED | OUTPATIENT
Start: 2024-03-28 | End: 2024-04-03

## 2024-03-27 RX ORDER — IPRATROPIUM BROMIDE AND ALBUTEROL SULFATE 2.5; .5 MG/3ML; MG/3ML
3 SOLUTION RESPIRATORY (INHALATION) EVERY 4 HOURS PRN
Status: DISCONTINUED | OUTPATIENT
Start: 2024-03-27 | End: 2024-03-28

## 2024-03-27 RX ORDER — POLYETHYLENE GLYCOL 3350 17 G/17G
17 POWDER, FOR SOLUTION ORAL DAILY PRN
Status: DISCONTINUED | OUTPATIENT
Start: 2024-03-27 | End: 2024-04-03

## 2024-03-27 RX ORDER — ONDANSETRON 2 MG/ML
4 INJECTION INTRAMUSCULAR; INTRAVENOUS EVERY 4 HOURS PRN
Status: DISCONTINUED | OUTPATIENT
Start: 2024-03-27 | End: 2024-03-27

## 2024-03-27 NOTE — PROGRESS NOTES
Received report from ED RN. Pt arrived to unit via cart on 15L non-rebreather. VSS. Pt reports pain at 2/10, generally all over. Medicated cough per orders. Pt on isolation for flu type A. POC continues. Call light in reach. Will continue to monitor.

## 2024-03-27 NOTE — H&P
[facility]     Pancho Arellano Patient Status:  Emergency    1965 MRN [id number]   Location [facility] EMER[facility]ENCY DE[facility]MENT Attending Bonifacio Baptiste MD   Hosp Day # 0 PCP Jose Mills MD     Chief Complaint: SOB, cough    Subjective:    History of Present Illness:   Pancho Arellano is a 58 year old male with PMHx pseudomyxoma peritonei and gouty arthritis shingles fever, chills, cough, shortness of breath, congestion and myalgias that have been ongoing for the past 4 days.  His niece and nephew are sick as well with similar symptoms.  He denies any chest pain, nausea, vomiting or dysuria.  He has had multiple episodes of diarrhea.  He has noticed that he was wheezing at home intermittently.  He stopped smoking cigarettes 25 years ago, has a 15-pack-year history.  He still occasionally smokes cigars.  He was in urgent care initially and was saturating in the 80s and so was transferred to ER.  He was placed on nonrebreather with oxygen saturations 91%.  In the ER he was found to be positive for influenza B and was admitted to ICU for close respiratory monitoring.  He is already feeling better since arrival.    History/Other:    Past Medical History:  Past Medical History:   Diagnosis Date    Back problem     nerve pain.  Left side rib cage    Calculus of kidney     Cancer (HCC)         Depression     20 yrs ago    GERD     GOUT     Pseudomyxoma peritonei (HCC)     Visual impairment     glasses     Past Surgical History:   Past Surgical History:   Procedure Laterality Date    COLONOSCOPY      COLONOSCOPY & POLYPECTOMY      polyps; no cancer; repeat 3 yrs    COLONOSCOPY & POLYPECTOMY      polyp; repeat 5 yrs (hyperplastic but hx of adenoma)    COLONOSCOPY,BIOPSY N/A 2016    Procedure: COLONOSCOPY, POSSIBLE BIOPSY, POSSIBLE POLYPECTOMY 44969;  Surgeon: Awais Scott MD;  [facility] [facility] [place]    COLONOSCOPY,REMV LESN,SNARE   11/5/2012    Procedure: ESOPHAGOGASTRODUODENOSCOPY, COLONOSCOPY, POSSIBLE BIOPSY, POSSIBLE POLYPECTOMY 48689,28339;  Surgeon: Awais Scott MD;  Location: [facility]    COLONOSCPY, FLEXIBLE, PROXIMAL TO SPLENIC FLEXURE; W/DIRECTED SUBMUCOSA INJECTION(S), ANY SUBSTANCE  11/5/2012    Procedure: ESOPHAGOGASTRODUODENOSCOPY, COLONOSCOPY, POSSIBLE BIOPSY, POSSIBLE POLYPECTOMY 33609,66402;  Surgeon: Awais Scott MD;  Location: [facility]    CYSTOSCOPY,REMV CALCULUS,SIMPLE  11/20/2012    Procedure: CYSTOSCOPY WITH REMOVAL OF STENT;  Surgeon: Gerard Taylor MD;  Location: [facility]    FLUOR GID & LOCLZJ NDL/CATH SPI DX/THER NJX  3/27/2014    Procedure: LUMBAR EPIDURAL;  Surgeon: Geovanny Ricci MD;  Location: [facility]    FLUOR GID & LOCLZJ NDL/CATH SPI DX/THER NJX  4/15/2014    Procedure: LUMBAR EPIDURAL;  Surgeon: Geovanny Ricci MD;  Location: [facility]    FLUOR GID & LOCLZJ NDL/CATH SPI DX/THER NJX  4/29/2014    Procedure: LUMBAR EPIDURAL;  Surgeon: Geovanny Ricci MD;  Location: [facility]    FRAGMENTING OF KIDNEY STONE  11/20/2012    Procedure: LITHOTRIPSY WITH CYSTOSCOPY, STENT PLACEMENT;  Surgeon: Gerard Taylor MD;  Location: [facility]    GASTRO - DMG  11/12    normal    INJECTION, W/WO CONTRAST, DX/THERAPEUTIC SUBSTANCE, EPIDURAL/SUBARACHNOID; LUMBAR/SACRAL  3/27/2014    Procedure: LUMBAR EPIDURAL;  Surgeon: Geovanny Ricci MD;  Location: [facility]    INJECTION, W/WO CONTRAST, DX/THERAPEUTIC SUBSTANCE, EPIDURAL/SUBARACHNOID; LUMBAR/SACRAL  4/15/2014    Procedure: LUMBAR EPIDURAL;  Surgeon: Geovanny Ricci MD;  Location: [facility]    INJECTION, W/WO CONTRAST, DX/THERAPEUTIC SUBSTANCE, EPIDURAL/SUBARACHNOID; LUMBAR/SACRAL  4/29/2014    Procedure: LUMBAR EPIDURAL;  Surgeon: Geovanny Ricci MD;  Location: [facility]    IR  URETER TUBE REMOVAL VIA ILEAL CONDUIT      LITHOTRIPSY  11/20/12    OTHER SURGICAL HISTORY      bilat knee scopes/multiple each knee    OTHER SURGICAL HISTORY      multiple ankle procedures/skin surgeries    OTHER SURGICAL HISTORY  10-24-12    sx-EDW-Left ureteral stone, left pyelonephritisi-Dr. Taylor    OTHER SURGICAL HISTORY  1/2013     Pseuodmyxoma Peritoneii stage IV s/p DAISY, omenentectomy, right hemicolectomy, and intraperitoneal chemotherapy 01/2013    OTHER SURGICAL HISTORY  07/22/13    Cystoscopy with Stent Removal - Dr. Taylor    OTHER SURGICAL HISTORY  11/23/2014    Exp lap, extensive cytoreduction of abdomina tumor with peritoneal stripping, partial greater omentectomy,  HIPEC with mitomycin C     OTHER SURGICAL HISTORY  11/19/2019    Cytoreductive surgery, Partial hepatectomy, Partial resection of left diaphragm, Small-bowel resection, Hyperthermic intraperitoneal chemotherapy with mitomycin C    PATIENT DOCUMENTED NOT TO HAVE EXPERIENCED ANY OF THE FOLLOWING EVENTS  11/20/2012    Procedure: CYSTOSCOPY WITH REMOVAL OF STENT;  Surgeon: Gerard Taylor MD;  Location: [facility]    PATIENT DOCUMENTED NOT TO HAVE EXPERIENCED ANY OF THE FOLLOWING EVENTS  11/20/2012    Procedure: CYSTOSCOPY WITH REMOVAL OF STENT;  Surgeon: Gerard Taylor MD;  Location: [facility]    PATIENT DOCUMENTED NOT TO HAVE EXPERIENCED ANY OF THE FOLLOWING EVENTS  3/27/2014    Procedure: LUMBAR EPIDURAL;  Surgeon: Geovanny Ricci MD;  Location: [facility]    PATIENT DOCUMENTED NOT TO HAVE EXPERIENCED ANY OF THE FOLLOWING EVENTS  4/15/2014    Procedure: LUMBAR EPIDURAL;  Surgeon: Geovanny Ricci MD;  Location: [facility]    PATIENT DOCUMENTED NOT TO HAVE EXPERIENCED ANY OF THE FOLLOWING EVENTS  4/29/2014    Procedure: LUMBAR EPIDURAL;  Surgeon: Geovanny Ricci MD;  Location: [facility]    PATIENT DOCUMENTED NOT TO HAVE EXPERIENCED ANY OF THE FOLLOWING  EVENTS N/A 1/25/2016    Procedure: COLONOSCOPY, POSSIBLE BIOPSY, POSSIBLE POLYPECTOMY 43614;  Surgeon: Awais Scott MD;  Location: [facility]    PATIENT WITH PREOPERATIVE ORDER FOR IV ANTIBIOTIC SURGICAL SITE INFECT  11/20/2012    Procedure: CYSTOSCOPY WITH REMOVAL OF STENT;  Surgeon: Gerard Taylor MD;  Location: [facility]    PATIENT WITHOUGH PREOPERATIVE ORDER FOR IV ANTIBIOTIC SURGICAL SITE INFECTION PROPHYLAXIS.  11/5/2012    Procedure: ESOPHAGOGASTRODUODENOSCOPY, COLONOSCOPY, POSSIBLE BIOPSY, POSSIBLE POLYPECTOMY 21622,69855;  Surgeon: Awais Scott MD;  Location: [facility]    PATIENT WITHOUGH PREOPERATIVE ORDER FOR IV ANTIBIOTIC SURGICAL SITE INFECTION PROPHYLAXIS.  3/27/2014    Procedure: LUMBAR EPIDURAL;  Surgeon: Geovanny Ricci MD;  Location: [facility]    PATIENT WITHOUGH PREOPERATIVE ORDER FOR IV ANTIBIOTIC SURGICAL SITE INFECTION PROPHYLAXIS.  4/15/2014    Procedure: LUMBAR EPIDURAL;  Surgeon: Geovanny Ricci MD;  Location: [facility]    PATIENT WITHOUGH PREOPERATIVE ORDER FOR IV ANTIBIOTIC SURGICAL SITE INFECTION PROPHYLAXIS.  4/29/2014    Procedure: LUMBAR EPIDURAL;  Surgeon: Geovanny Ricci MD;  Location: [facility]    PATIENT WITHOUGH PREOPERATIVE ORDER FOR IV ANTIBIOTIC SURGICAL SITE INFECTION PROPHYLAXIS. N/A 1/25/2016    Procedure: COLONOSCOPY, POSSIBLE BIOPSY, POSSIBLE POLYPECTOMY 64659;  Surgeon: Awais Scott MD;  Location: [facility]    UPPER GI ENDOSCOPY,DIAGNOSIS  11/5/2012    Procedure: ESOPHAGOGASTRODUODENOSCOPY, COLONOSCOPY, POSSIBLE BIOPSY, POSSIBLE POLYPECTOMY 62064,54290;  Surgeon: Awais Scott MD;  Location: [facility]      Family History:   Family History   Problem Relation Age of Onset    Cancer Maternal Grandmother     Diabetes Father     Diabetes Mother     Diabetes Brother      Social History:    reports that he quit  smoking about 7 years ago. His smoking use included cigarettes and cigars. He has a 24 pack-year smoking history. He has never used smokeless tobacco. He reports that he does not drink alcohol and does not use drugs.     Allergies:   Allergies   Allergen Reactions    Magnevist [Gadopentetate Dimeglumine] ANGIOEDEMA     MRI CONTRAST ALLERGY  1/2/18: pt states he got significant HA w/contrast injection, requiring hydration and hospitalization in past. NO problem w/CT contrast dye. CKRN     Albumin, Egg NAUSEA AND VOMITING    Egg NAUSEA AND VOMITING    Morphine NAUSEA ONLY     Medications:    No current facility-administered medications on file prior to encounter.     Current Outpatient Medications on File Prior to Encounter   Medication Sig Dispense Refill    methylPREDNISolone 4 MG Oral Tablet Therapy Pack  (Patient not taking: Reported on 11/22/2023)      FEBUXOSTAT 80 MG Oral Tab TAKE 1 TABLET BY MOUTH DAILY 90 tablet 1    oxyCODONE HCl ER 40 MG Oral Tablet Extended Release 12 hour Abuse-Deterrent Take 1 tablet (40 mg total) by mouth Q12H.      acetaminophen 500 MG Oral Tab Take 2 tablets (1,000 mg total) by mouth every 6 (six) hours as needed for Pain.      lidocaine-prilocaine 2.5-2.5 % External Cream APPLY 4 GRAMS (4 PUMPS) TO THE AFFECTED AREA(S) THREE TO FOUR TIMES DAILY FOR THE TREATMENT OF PAIN (Patient not taking: Reported on 10/25/2023)  11     Review of Systems:   A comprehensive review of systems was completed.    Pertinent positives and negatives noted in the HPI.    Objective:   Physical Exam:    /71   Pulse 97   Temp 99.8 °F (37.7 °C) (Temporal)   Resp 25   SpO2 100%   General: No acute distress, Alert  Respiratory: Clear bilaterally. Not wheezing on my exam  Cardiovascular: S1, S2. Regular rate and rhythm  Abdomen: Soft, Non-tender, non-distended, positive bowel sounds  Neuro: SEAY x 4  Extremities: No edema    Results:    Labs:      Labs Last 24 Hours:  Recent Labs   Lab 03/27/24  1334    RBC 5.42   HGB 17.5   HCT 52.5   MCV 96.9   MCH 32.3   MCHC 33.3   RDW 12.2   NEPRELIM 10.19*   WBC 11.8*   .0     Recent Labs   Lab 03/27/24  1334   *   BUN 18   CREATSERUM 2.04*   EGFRCR 37*   CA 9.3   ALB 4.2      K 4.2      CO2 26.0   ALKPHO 96   AST 29   ALT 34   BILT 0.4   TP 8.5*     Lab Results   Component Value Date    PT 13.5 10/29/2012    PT 13.4 10/24/2012    INR <0.90 (L) 09/15/2014    INR 1.06 10/29/2012    INR 1.05 10/24/2012     No results for input(s): \"TROP\", \"TROPHS\", \"CK\" in the last 168 hours.    No results for input(s): \"TROP\", \"PBNP\" in the last 168 hours.    No results for input(s): \"PCT\" in the last 168 hours.    Imaging: Imaging data reviewed in Epic.    Assessment & Plan:      #Acute hypoxic respiratory failure due to influenza B infection  -Wean oxygen as tolerated  -PRN nebs  -Pulm consult    #Influenza B infection  -Tamiflu  -Cough medications    #Sepsis POA likely due to influenza infection  -S/p sepsis bolus, continue maintenance IVF overnight  -Lactic acid normal  -Follow blood cultures  -Given one-time dose of Zosyn but hold further antibiotics  -Monitor WBC    #LORE on CKD stage III  -IVF    #Pseudomyxoma peritonei   -Sees Dr. Tapia and is doing well clinically and radiographically.  Most recent CT scans were stable findings and tumor markers were unremarkable.  He follows up every 6 months  -Resume home pain meds    #Gouty arthritis  -Follows with Dr. Zohreh Amin  -Maurooric continued    Plan of care discussed with patient.CCT 34 minutes.    Destin Walker DO    Supplementary Documentation:     The 21st Century Cures Act makes medical notes like these available to patients in the interest of transparency. Please be advised this is a medical document. Medical documents are intended to carry relevant information, facts as evident, and the clinical opinion of the practitioner. The medical note is intended as peer to peer communication and may appear blunt or  direct. It is written in medical language and may contain abbreviations or verbiage that are unfamiliar.

## 2024-03-27 NOTE — ED INITIAL ASSESSMENT (HPI)
Pt to ER from Urgent care. States cough since Saturday. Saturation was mid 80's in clinic, 91% on 15L nonrebreather. 101.4 temperature. Denies any CP or dizziness at this time.

## 2024-03-27 NOTE — ED QUICK NOTES
Orders for admission, patient is aware of plan and ready to go upstairs. Any questions, please call ED RN Kp at extension [phone].     Patient Covid vaccination status: Fully vaccinated     COVID Test Ordered in ED: SARS-CoV-2/Flu A and B/RSV by PCR (GeneXpert)    COVID Suspicion at Admission: N/A    Running Infusions:      Mental Status/LOC at time of transport: Alert and orientex x 4    Other pertinent information:   CIWA score: N/A   NIH score:  N/A

## 2024-03-27 NOTE — CONSULTS
ICU  Critical Care APRN Progress Note    NAME: Pancho Arellano - ROOM: A7/A7 - MRN: [id number] - Age: 58 year old - :1965    History Of Present Illness:  Pancho Arellano is a 58 year old male with PMHx significant for kidney stones, pseudomyxoma peritonei, depression, GERD, and Gout presents to the ED for shortness of breath and low oxygen saturations.  Patient to ED from MD office on NRB with O2 saturation 91%.  Patient notes cough, fever, and difficulty breathing x4 days.  Patient was found to be positive for influenza B.  Patient to be admitted to ICU for close respiratory and hemodynamic monitoring.    Patient arrives to ICU on cart, cough, feeling better than when he arrived at the hospital    PMH:  Past Medical History:   Diagnosis Date    Back problem     nerve pain.  Left side rib cage    Calculus of kidney     Cancer (HCC)         Depression     20 yrs ago    GERD     GOUT     Pseudomyxoma peritonei (HCC)     Visual impairment     glasses       Social Hx:  Social History     Socioeconomic History    Marital status:    Tobacco Use    Smoking status: Former     Packs/day: 1.50     Years: 16.00     Additional pack years: 0.00     Total pack years: 24.00     Types: Cigarettes, Cigars     Quit date: 2016     Years since quittin.3    Smokeless tobacco: Never    Tobacco comments:     quit Dec 2016.  1 cigar per day   Vaping Use    Vaping Use: Never used   Substance and Sexual Activity    Alcohol use: No     Alcohol/week: 0.0 standard drinks of alcohol    Drug use: No   Other Topics Concern    Caffeine Concern Yes     Comment: Mountain Dew       Family Hx:  Family History   Problem Relation Age of Onset    Cancer Maternal Grandmother     Diabetes Father     Diabetes Mother     Diabetes Brother          Review of Systems:   A comprehensive 10 point review of systems was completed.  Pertinent positives and negatives noted in the HPI.    OBJECTIVE  Vitals:  /65   Pulse 92    Temp 99.8 °F (37.7 °C) (Temporal)   Resp (!) 29   SpO2 97%                Physical Exam:    General Appearance: Alert, cooperative, no distress, appears stated age  Neck: No JVD, neck supple, no adenopathy, trachea midline, no carotid bruits  Lungs: Clear to auscultation bilaterally, respirations unlabored  Heart: Regular rate and rhythm, S1 and S2 normal, no murmur, rub or gallop  Abdomen: Soft, non-tender, bowel sounds active all four quadrants, no masses, no organomegaly  Extremities: Extremities normal, atraumatic, no cyanosis or edema,capillary refill <3 sec.    Pulses: 2+ and symmetric all extremities  Skin: Skin color, texture, turgor normal for ethnicity, no rashes or lesions, warm and dry  Neurologic: CNII-XII intact, normal strength    Data this admission:  XR CHEST AP PORTABLE  (CPT=71045)    Result Date: 3/27/2024  CONCLUSION:  There is no acute abnormality detected on this single portable chest radiograph.   [facility]      Dictated by (CST): Ronni Mercado MD on 3/27/2024 at 2:58 PM     Finalized by (CST): Ronni Mercado MD on 3/27/2024 at 2:59 PM         Labs:  Lab Results   Component Value Date    WBC 11.8 03/27/2024    HGB 17.5 03/27/2024    HCT 52.5 03/27/2024    .0 03/27/2024    CREATSERUM 2.04 03/27/2024    BUN 18 03/27/2024     03/27/2024    K 4.2 03/27/2024     03/27/2024    CO2 26.0 03/27/2024     03/27/2024    CA 9.3 03/27/2024    ALB 4.2 03/27/2024    ALKPHO 96 03/27/2024    BILT 0.4 03/27/2024    TP 8.5 03/27/2024    AST 29 03/27/2024    ALT 34 03/27/2024       Assessment/Plan:    Acute respiratory failure 2/2 influenza B  -place on bipap as needed  -wean O2 as able  -cough medication  -nebulizer as needed    Malignant pseudomyxoma peritonei  -follows with Dr. Tapia  -under surveillance with follow up in 6 months    Gout  -continue Febuxostat    Depression  -continue home medications    GERD  -continue home medications    F/E/N  -regular diet  -replete  electrolytes as needed    Proph  -Lovenox  -SCD    Dispo  -Full code  -ICU for risk for decompensation      Plan of care discussed with intensivist on-call, Dr. Collins.    Patricia Rao, [facility]  Critical Care NP  Phone [phone]      A total of 35 minutes of critical care time (exclusive of billable procedures) was administered. This involved direct patient intervention, complex decision making, and/or extensive discussions with the patient, family, and clinical staff.

## 2024-03-27 NOTE — PROGRESS NOTES
CHIEF COMPLAINT:     Chief Complaint   Patient presents with    Cough     Coughing since Sat, easily winded, short of breath since cough.  Had 100.1 at beginning, has had liquid diarrhea.        HPI:   Pancho Arellano is a 58 year old male who presents with c/o URI symptoms, sob, diarrhea and fever. Possible exposure to a grand child who has flu. Wife reports that the patient was doing much better yesterday.        Current Outpatient Medications   Medication Sig Dispense Refill    FEBUXOSTAT 80 MG Oral Tab TAKE 1 TABLET BY MOUTH DAILY 90 tablet 1    oxyCODONE HCl ER 40 MG Oral Tablet Extended Release 12 hour Abuse-Deterrent Take 1 tablet (40 mg total) by mouth Q12H.      acetaminophen 500 MG Oral Tab Take 2 tablets (1,000 mg total) by mouth every 6 (six) hours as needed for Pain.      methylPREDNISolone 4 MG Oral Tablet Therapy Pack  (Patient not taking: Reported on 11/22/2023)      lidocaine-prilocaine 2.5-2.5 % External Cream APPLY 4 GRAMS (4 PUMPS) TO THE AFFECTED AREA(S) THREE TO FOUR TIMES DAILY FOR THE TREATMENT OF PAIN (Patient not taking: Reported on 10/25/2023)  11      Past Medical History:   Diagnosis Date    Back problem     nerve pain.  Left side rib cage    Calculus of kidney     Cancer (HCC)         Depression     20 yrs ago    GERD     GOUT     Pseudomyxoma peritonei (HCC)     Visual impairment     glasses      Past Surgical History:   Procedure Laterality Date    COLONOSCOPY      COLONOSCOPY & POLYPECTOMY  11/12    polyps; no cancer; repeat 3 yrs    COLONOSCOPY & POLYPECTOMY  1/16    polyp; repeat 5 yrs (hyperplastic but hx of adenoma)    COLONOSCOPY,BIOPSY N/A 1/25/2016    Procedure: COLONOSCOPY, POSSIBLE BIOPSY, POSSIBLE POLYPECTOMY 61020;  Surgeon: Awais Scott MD;  [place] [facility]    COLONOSCOPY,REMV LESN,SNARE  11/5/2012    Procedure: ESOPHAGOGASTRODUODENOSCOPY, COLONOSCOPY, POSSIBLE BIOPSY, POSSIBLE POLYPECTOMY 15333,34899;  Surgeon: Awais Scott MD;   Location: [facility]    COLONOSCPY, FLEXIBLE, PROXIMAL TO SPLENIC FLEXURE; W/DIRECTED SUBMUCOSA INJECTION(S), ANY SUBSTANCE  11/5/2012    Procedure: ESOPHAGOGASTRODUODENOSCOPY, COLONOSCOPY, POSSIBLE BIOPSY, POSSIBLE POLYPECTOMY 44622,78920;  Surgeon: Awais Scott MD;  Location: [facility]    CYSTOSCOPY,REMV CALCULUS,SIMPLE  11/20/2012    Procedure: CYSTOSCOPY WITH REMOVAL OF STENT;  Surgeon: Gerard Taylor MD;  Location: [facility]    FLUOR GID & LOCLZJ NDL/CATH SPI DX/THER NJX  3/27/2014    Procedure: LUMBAR EPIDURAL;  Surgeon: Geovanny Ricci MD;  Location: [facility]    FLUOR GID & LOCLZJ NDL/CATH SPI DX/THER NJX  4/15/2014    Procedure: LUMBAR EPIDURAL;  Surgeon: Geovanny Ricci MD;  Location: [facility]    FLUOR GID & LOCLZJ NDL/CATH SPI DX/THER NJX  4/29/2014    Procedure: LUMBAR EPIDURAL;  Surgeon: Geovanny Ricci MD;  Location: [facility]    FRAGMENTING OF KIDNEY STONE  11/20/2012    Procedure: LITHOTRIPSY WITH CYSTOSCOPY, STENT PLACEMENT;  Surgeon: Gerard Taylor MD;  Location: [facility]    GASTRO - DMG  11/12    normal    INJECTION, W/WO CONTRAST, DX/THERAPEUTIC SUBSTANCE, EPIDURAL/SUBARACHNOID; LUMBAR/SACRAL  3/27/2014    Procedure: LUMBAR EPIDURAL;  Surgeon: Geovanny Ricci MD;  Location: [facility]    INJECTION, W/WO CONTRAST, DX/THERAPEUTIC SUBSTANCE, EPIDURAL/SUBARACHNOID; LUMBAR/SACRAL  4/15/2014    Procedure: LUMBAR EPIDURAL;  Surgeon: Geovanny Ricci MD;  Location: [facility]    INJECTION, W/WO CONTRAST, DX/THERAPEUTIC SUBSTANCE, EPIDURAL/SUBARACHNOID; LUMBAR/SACRAL  4/29/2014    Procedure: LUMBAR EPIDURAL;  Surgeon: Geovanny Ricci MD;  Location: [facility]    IR URETER TUBE REMOVAL VIA ILEAL CONDUIT      LITHOTRIPSY  11/20/12    OTHER SURGICAL HISTORY      bilat knee scopes/multiple each knee    OTHER SURGICAL  HISTORY      multiple ankle procedures/skin surgeries    OTHER SURGICAL HISTORY  10-24-12    sx-EDW-Left ureteral stone, left pyelonephritisi-Dr. Taylor    OTHER SURGICAL HISTORY  1/2013     Pseuodmyxoma Peritoneii stage IV s/p DAISY, omenentectomy, right hemicolectomy, and intraperitoneal chemotherapy 01/2013    OTHER SURGICAL HISTORY  07/22/13    Cystoscopy with Stent Removal - Dr. Taylor    OTHER SURGICAL HISTORY  11/23/2014    Exp lap, extensive cytoreduction of abdomina tumor with peritoneal stripping, partial greater omentectomy,  HIPEC with mitomycin C     OTHER SURGICAL HISTORY  11/19/2019    Cytoreductive surgery, Partial hepatectomy, Partial resection of left diaphragm, Small-bowel resection, Hyperthermic intraperitoneal chemotherapy with mitomycin C    PATIENT DOCUMENTED NOT TO HAVE EXPERIENCED ANY OF THE FOLLOWING EVENTS  11/20/2012    Procedure: CYSTOSCOPY WITH REMOVAL OF STENT;  Surgeon: Gerard Taylor MD;  Location: [facility]    PATIENT DOCUMENTED NOT TO HAVE EXPERIENCED ANY OF THE FOLLOWING EVENTS  11/20/2012    Procedure: CYSTOSCOPY WITH REMOVAL OF STENT;  Surgeon: Gerard Taylor MD;  Location: [facility]    PATIENT DOCUMENTED NOT TO HAVE EXPERIENCED ANY OF THE FOLLOWING EVENTS  3/27/2014    Procedure: LUMBAR EPIDURAL;  Surgeon: Geovanny Ricci MD;  Location: [facility]    PATIENT DOCUMENTED NOT TO HAVE EXPERIENCED ANY OF THE FOLLOWING EVENTS  4/15/2014    Procedure: LUMBAR EPIDURAL;  Surgeon: Geovanny Ricci MD;  Location: [facility]    PATIENT DOCUMENTED NOT TO HAVE EXPERIENCED ANY OF THE FOLLOWING EVENTS  4/29/2014    Procedure: LUMBAR EPIDURAL;  Surgeon: Geovanny Ricci MD;  Location: [facility]    PATIENT DOCUMENTED NOT TO HAVE EXPERIENCED ANY OF THE FOLLOWING EVENTS N/A 1/25/2016    Procedure: COLONOSCOPY, POSSIBLE BIOPSY, POSSIBLE POLYPECTOMY 93510;  Surgeon: Awais Scott MD;  [facility]    PATIENT WITH PREOPERATIVE ORDER FOR IV ANTIBIOTIC SURGICAL SITE INFECT  2012    Procedure: CYSTOSCOPY WITH REMOVAL OF STENT;  Surgeon: Gerard Taylor MD;  Location: [facility]    PATIENT WITHOUGH PREOPERATIVE ORDER FOR IV ANTIBIOTIC SURGICAL SITE INFECTION PROPHYLAXIS.  2012    Procedure: ESOPHAGOGASTRODUODENOSCOPY, COLONOSCOPY, POSSIBLE BIOPSY, POSSIBLE POLYPECTOMY [id number];  Surgeon: Awasi Sctot MD;  Location: [facility]    PATIENT WITHOUGH PREOPERATIVE ORDER FOR IV ANTIBIOTIC SURGICAL SITE INFECTION PROPHYLAXIS.  3/27/2014    Procedure: LUMBAR EPIDURAL;  Surgeon: Geovanny Ricci MD;  Location: [facility]    PATIENT WITHOUGH PREOPERATIVE ORDER FOR IV ANTIBIOTIC SURGICAL SITE INFECTION PROPHYLAXIS.  4/15/2014    Procedure: LUMBAR EPIDURAL;  Surgeon: Geovanny Ricci MD;  Location: [facility]    PATIENT WITHOUGH PREOPERATIVE ORDER FOR IV ANTIBIOTIC SURGICAL SITE INFECTION PROPHYLAXIS.  2014    Procedure: LUMBAR EPIDURAL;  Surgeon: Geovanny Ricci MD;  Location: [facility]    PATIENT WITHOUGH PREOPERATIVE ORDER FOR IV ANTIBIOTIC SURGICAL SITE INFECTION PROPHYLAXIS. N/A 2016    Procedure: COLONOSCOPY, POSSIBLE BIOPSY, POSSIBLE POLYPECTOMY 54571;  Surgeon: Awais Scott MD;  Location: [facility]    UPPER GI ENDOSCOPY,DIAGNOSIS  2012    Procedure: ESOPHAGOGASTRODUODENOSCOPY, COLONOSCOPY, POSSIBLE BIOPSY, POSSIBLE POLYPECTOMY 05849,69254;  Surgeon: Awais Scott MD;  Location: [facility]         Social History     Socioeconomic History    Marital status:    Tobacco Use    Smoking status: Former     Packs/day: 1.50     Years: 16.00     Additional pack years: 0.00     Total pack years: 24.00     Types: Cigarettes, Cigars     Quit date: 2016     Years since quittin.3    Smokeless tobacco: Never    Tobacco comments:     quit Dec  2016.  1 cigar per day   Vaping Use    Vaping Use: Never used   Substance and Sexual Activity    Alcohol use: No     Alcohol/week: 0.0 standard drinks of alcohol    Drug use: No   Other Topics Concern    Caffeine Concern Yes     Comment: Mountain Dew         REVIEW OF SYSTEMS:   GENERAL: decreased appetite  SKIN: no rashes or abnormal skin lesions  HEENT: See HPI  LUNGS: denies shortness of breath or wheezing, See HPI  CARDIOVASCULAR: denies chest pain or palpitations   GI: denies N/V/C or abdominal pain  NEURO: Denies dizziness or weakness    EXAM:   /62   Pulse 112   Temp 100.1 °F (37.8 °C) (Oral)   Resp 24   Ht 6' 1\" (1.854 m)   Wt 237 lb (107.5 kg)   BMI 31.27 kg/m²   GENERAL: +Respiratory distress  LUNGS: symmetrical chest rise, tachypnea, Coarse bilateral breath sound, diminished in bases  CARDIO: RRR without murmur  EXTREMITIES: no cyanosis, clubbing or edema    Patient noted to be in respiratory distress. RR 30's. . 86% on room air. Patient placed on 8Lpm NC, saturations 91-92%. Given DuoNeb. Reports slight improvement in sob.   EMS called.       ASSESSMENT AND PLAN:   Pancho Arellano is a 58 year old male who presents with upper respiratory symptoms that are consistent with    ASSESSMENT:   No diagnosis found.    PLAN:     Comfort care per pt instructions.   To follow up for any new or worsening symptoms.   To go to the ER for any severe symptoms such as difficulty breathing or chest pain.     Meds & Refills for this Visit:  Requested Prescriptions      No prescriptions requested or ordered in this encounter       Risks, benefits, and side effects of medication explained and discussed.    There are no Patient Instructions on file for this visit.    The patient indicates understanding of these issues and agrees to the plan.  The patient is asked to return if sx's persist or worsen.

## 2024-03-27 NOTE — ED PROVIDER NOTES
Patient Seen in: [facility] Emergency Department      History     Chief Complaint   Patient presents with    Difficulty Breathing     91% on 15L nonrebreather       Stated Complaint: 91% 15L nonrebreather    Subjective:   HPI    58-year-old male who presents to the emergency department with shortness of breath and low O2 saturations.  The patient on review of his records has a malignant pseudomyxoma peritonei but was recently seen for an office visit on 3/27/2024 when he was found to be short of breath and hypoxemic.  O2 saturations were as low as 80%.  He is on a 15 L of nonrebreather had O2 saturation of 91% when he arrived to the ER.  The patient tells me he has been coughing and short of breath for the past 4 days.  He has had fevers as high as 102 at home.  He said he was exposed to a family member who was recently been sick with upper respiratory tract illness.  He claims that he tested negative for COVID 2 days ago.  No vomiting.  No change in stool habits.  No urinary complaints.    Objective:   No pertinent past medical history.            No pertinent past surgical history.              No pertinent social history.            Review of Systems    Positive for stated complaint: 91% 15L nonrebreather  Other systems are as noted in HPI.  Constitutional and vital signs reviewed.      All other systems reviewed and negative except as noted above.    Physical Exam     ED Triage Vitals   BP 03/27/24 1321 119/77   Pulse 03/27/24 1318 106   Resp 03/27/24 1319 (!) 32   Temp 03/27/24 1334 100.1 °F (37.8 °C)   Temp src 03/27/24 1334 Temporal   SpO2 03/27/24 1318 99 %   O2 Device 03/27/24 1318 Non-rebreather mask       Current:/71   Pulse 97   Temp 99.8 °F (37.7 °C) (Temporal)   Resp 25   SpO2 100%         Physical Exam  General: This a pleasant but dyspneic 58-year-old male.  He is on a nonrebreather at this time.  He is coughing.    HEENT: Pupils are equal reactive to light.  Extra ocular motions are  intact.  No scleral icterus or conjunctival pallor:   Lungs: Rhonchi in the left lung field with crackles greater than the right lung field.    Cardiac: Heart rate of 100.  Normal S1 and 2 without murmurs or ectopy normal S1 and 2 without murmurs or ectopy appreciated  Abdomen: Midline scar on abdomen from previous surgeries.  No abdominal distention or abdominal breathing.  No tenderness.    Extremities: No cyanosis, no edema or clubbing.  Pulses are +2.  Full range of motion is noted of the extremities without deformities.  No tenderness.  Neurologically intact.       ED Course     Labs Reviewed   COMP METABOLIC PANEL (14) - Abnormal; Notable for the following components:       Result Value    Glucose 123 (*)     Creatinine 2.04 (*)     eGFR-Cr 37 (*)     Total Protein 8.5 (*)     All other components within normal limits   ABG PANEL W ELECT AND LACTATE - Abnormal; Notable for the following components:    Sodium Blood Gas 132 (*)     All other components within normal limits   SARS-COV-2/FLU A AND B/RSV BY PCR (GENEXPERT) - Abnormal; Notable for the following components:    Influenza B by PCR Positive (*)     All other components within normal limits    Narrative:     This test is intended for the qualitative detection and differentiation of SARS-CoV-2, influenza A, influenza B, and respiratory syncytial virus (RSV) viral RNA in nasopharyngeal or nares swabs from individuals suspected of respiratory viral infection consistent with COVID-19 by their healthcare provider. Signs and symptoms of respiratory viral infection due to SARS-CoV-2, influenza, and RSV can be similar.    Test performed using the Xpert Xpress SARS-CoV-2/FLU/RSV (real time RT-PCR)  assay on the GeneXpert instrument, [employer], [place] [address].   This test is being used under the Food and Drug Administration's Emergency Use Authorization.    The authorized Fact Sheet for Healthcare Providers for this assay is available upon request from the  laboratory.   CBC W/ DIFFERENTIAL - Abnormal; Notable for the following components:    WBC 11.8 (*)     Neutrophil Absolute Prelim 10.19 (*)     Neutrophil Absolute 10.19 (*)     Lymphocyte Absolute 0.70 (*)     All other components within normal limits   LACTIC ACID, PLASMA - Normal   CBC WITH DIFFERENTIAL WITH PLATELET    Narrative:     The following orders were created for panel order CBC With Differential With Platelet.  Procedure                               Abnormality         Status                     ---------                               -----------         ------                     CBC W/ DIFFERENTIAL[[id number]]          Abnormal            Final result                 Please view results for these tests on the individual orders.   RAINBOW DRAW LAVENDER   RAINBOW DRAW LIGHT GREEN   RAINBOW DRAW BLUE   BLOOD CULTURE   BLOOD CULTURE     EKG    Rate, intervals and axes as noted on EKG Report.  Rate: 94  Rhythm: Sinus Rhythm  Reading: Normal sinus rhythm left axis deviation nonspecific ST-T wave changes noted            Narrative  PROCEDURE:  XR CHEST AP PORTABLE  (CPT=71045)     TECHNIQUE:  AP chest radiograph was obtained.     COMPARISON:  None.     INDICATIONS:  91% 15L nonrebreather     PATIENT STATED HISTORY: (As transcribed by Technologist)  Patient offered no additional history at this time.         FINDINGS:  Allowing for portable technique the lungs are clear.  Heart size is within normal limits.  Mediastinum and maritza are unremarkable.  Chest wall structures are unremarkable.                  Impression  CONCLUSION:  There is no acute abnormality detected on this single portable chest radiograph.        LOCATION:                   Dictated by (CST): Ronni Mercado MD on 3/27/2024 at 2:58 PM      Finalized by (CST): Ronni Mercado MD on 3/27/2024 at 2:59 PM                MDM    Differential diagnosis includes but is not limited to pneumonia, sepsis from pneumonia, pneumothorax, COVID,  influenza, CHF.  Admission disposition: 3/27/2024  4:06 PM       The patient has hypoxia and is placed on a nonrebreather.  O2 saturations did come up to 97%.  He will have a blood gas performed.  Laboratories will be sent.  With his hypoxia he will need hospitalization.  Because of my concern for sepsis he had an IV bolus started at 30 cc/kg as well as received broad-spectrum antibiotic therapy.  Patient's lactic acid was 2.0.  White count 11,800.  Glucose 123 creatinine of 2.04.  Influenza B was positive.  Blood gas pH was 7.38 pCO2 of 41 pO2 of 96 this is on 100% nonrebreather.  The patient is hypoxemic.  Has some mild acute kidney injury as well influenza B.  This is likely secondary infection on top of this though the chest x-ray  I reviewed the x-rays and agree with the radiologist report that showed no acute abnormality detected on the single portable chest x-ray  With the patient's severe hypoxemia he will need to go to the ICU for continued monitoring and carries a high intubation risk.    I spoke to the hospitalist service as well as the ICU service.  A total of 35 minutes of critical care time (exclusive of billable procedures) was administered to manage the patient's unstable vital signs and respiratory instability due to his hypoxemia with influenza B and likely secondary pneumonia.  This involved direct patient intervention, complex decision making, and/or extensive discussions with the patient, family, and clinical staff.                  [facility]   part of [facility]      Sepsis Reassessment Note    /77   Pulse 101   Resp (!) 32   SpO2 99%      I completed the sepsis reassessment at 1604    Cardiac:  Regularity: Regular  Rate: Normal  Heart Sounds: S1,S2    Lungs:   Right: Rhonchi  Left: Rhonchi    Peripheral Pulses:  Radial: Right 2+ or Left 2+      Capillary Refill:  <3 Secs    Skin:  Temp/Moisture: Warm and Dry  Color: Normal      Bonifacio Baptiste MD  3/27/2024  1:24  PM            Medical Decision Making      Disposition and Plan     Clinical Impression:  1. Influenza B    2. Hypoxemia         Disposition:  Admit  3/27/2024  4:06 pm    Follow-up:  No follow-up provider specified.        Medications Prescribed:  Current Discharge Medication List                            Hospital Problems       Present on Admission  Date Reviewed: 3/27/2024            ICD-10-CM Noted POA    * (Principal) Influenza B J10.1 3/27/2024 Unknown    Acute kidney injury (HCC) N17.9 3/27/2024 Yes    Hyperglycemia R73.9 3/27/2024 Yes    Hyponatremia E87.1 3/27/2024 Yes    Metabolic alkalosis E87.3 3/27/2024 Yes

## 2024-03-28 ENCOUNTER — APPOINTMENT (OUTPATIENT)
Dept: CT IMAGING | Facility: HOSPITAL | Age: 59
End: 2024-03-28
Attending: NURSE PRACTITIONER
Payer: MEDICARE

## 2024-03-28 ENCOUNTER — APPOINTMENT (OUTPATIENT)
Dept: CV DIAGNOSTICS | Facility: HOSPITAL | Age: 59
End: 2024-03-28
Payer: MEDICARE

## 2024-03-28 ENCOUNTER — APPOINTMENT (OUTPATIENT)
Dept: GENERAL RADIOLOGY | Facility: HOSPITAL | Age: 59
End: 2024-03-28
Payer: MEDICARE

## 2024-03-28 ENCOUNTER — APPOINTMENT (OUTPATIENT)
Dept: ULTRASOUND IMAGING | Facility: HOSPITAL | Age: 59
End: 2024-03-28
Payer: MEDICARE

## 2024-03-28 LAB
ANION GAP SERPL CALC-SCNC: 2 MMOL/L (ref 0–18)
BASOPHILS # BLD AUTO: 0.02 X10(3) UL (ref 0–0.2)
BASOPHILS NFR BLD AUTO: 0.1 %
BUN BLD-MCNC: 19 MG/DL (ref 9–23)
C DIFF TOX B STL QL: NEGATIVE
CALCIUM BLD-MCNC: 8.1 MG/DL (ref 8.5–10.1)
CHLORIDE SERPL-SCNC: 111 MMOL/L (ref 98–112)
CO2 SERPL-SCNC: 24 MMOL/L (ref 21–32)
CREAT BLD-MCNC: 1.48 MG/DL
D DIMER PPP FEU-MCNC: 1.46 UG/ML FEU (ref ?–0.59)
EGFRCR SERPLBLD CKD-EPI 2021: 54 ML/MIN/1.73M2 (ref 60–?)
EOSINOPHIL # BLD AUTO: 0 X10(3) UL (ref 0–0.7)
EOSINOPHIL NFR BLD AUTO: 0 %
ERYTHROCYTE [DISTWIDTH] IN BLOOD BY AUTOMATED COUNT: 12.6 %
GLUCOSE BLD-MCNC: 97 MG/DL (ref 70–99)
HCT VFR BLD AUTO: 41.6 %
HGB BLD-MCNC: 13.9 G/DL
IMM GRANULOCYTES # BLD AUTO: 0.11 X10(3) UL (ref 0–1)
IMM GRANULOCYTES NFR BLD: 0.7 %
LYMPHOCYTES # BLD AUTO: 1.74 X10(3) UL (ref 1–4)
LYMPHOCYTES NFR BLD AUTO: 11.8 %
MCH RBC QN AUTO: 32.6 PG (ref 26–34)
MCHC RBC AUTO-ENTMCNC: 33.4 G/DL (ref 31–37)
MCV RBC AUTO: 97.4 FL
MONOCYTES # BLD AUTO: 1.61 X10(3) UL (ref 0.1–1)
MONOCYTES NFR BLD AUTO: 10.9 %
NEUTROPHILS # BLD AUTO: 11.26 X10 (3) UL (ref 1.5–7.7)
NEUTROPHILS # BLD AUTO: 11.26 X10(3) UL (ref 1.5–7.7)
NEUTROPHILS NFR BLD AUTO: 76.5 %
NT-PROBNP SERPL-MCNC: 2334 PG/ML (ref ?–125)
OSMOLALITY SERPL CALC.SUM OF ELEC: 286 MOSM/KG (ref 275–295)
PLATELET # BLD AUTO: 147 10(3)UL (ref 150–450)
POTASSIUM SERPL-SCNC: 4.7 MMOL/L (ref 3.5–5.1)
RBC # BLD AUTO: 4.27 X10(6)UL
SODIUM SERPL-SCNC: 137 MMOL/L (ref 136–145)
WBC # BLD AUTO: 14.7 X10(3) UL (ref 4–11)

## 2024-03-28 PROCEDURE — 93306 TTE W/DOPPLER COMPLETE: CPT

## 2024-03-28 PROCEDURE — 99232 SBSQ HOSP IP/OBS MODERATE 35: CPT | Performed by: INTERNAL MEDICINE

## 2024-03-28 PROCEDURE — 71045 X-RAY EXAM CHEST 1 VIEW: CPT

## 2024-03-28 PROCEDURE — 93970 EXTREMITY STUDY: CPT

## 2024-03-28 PROCEDURE — 71275 CT ANGIOGRAPHY CHEST: CPT | Performed by: NURSE PRACTITIONER

## 2024-03-28 PROCEDURE — 99291 CRITICAL CARE FIRST HOUR: CPT | Performed by: INTERNAL MEDICINE

## 2024-03-28 RX ORDER — IPRATROPIUM BROMIDE AND ALBUTEROL SULFATE 2.5; .5 MG/3ML; MG/3ML
3 SOLUTION RESPIRATORY (INHALATION)
Status: DISCONTINUED | OUTPATIENT
Start: 2024-03-28 | End: 2024-04-01

## 2024-03-28 RX ORDER — METOPROLOL TARTRATE 100 MG/1
100 TABLET ORAL ONCE AS NEEDED
Status: DISCONTINUED | OUTPATIENT
Start: 2024-03-28 | End: 2024-04-03

## 2024-03-28 RX ORDER — BUDESONIDE 0.5 MG/2ML
0.5 INHALANT ORAL 2 TIMES DAILY
Status: DISCONTINUED | OUTPATIENT
Start: 2024-03-28 | End: 2024-03-29

## 2024-03-28 RX ORDER — ARFORMOTEROL TARTRATE 15 UG/2ML
15 SOLUTION RESPIRATORY (INHALATION)
Status: DISCONTINUED | OUTPATIENT
Start: 2024-03-28 | End: 2024-03-30

## 2024-03-28 RX ORDER — METOPROLOL TARTRATE 50 MG/1
50 TABLET, FILM COATED ORAL ONCE AS NEEDED
Status: ACTIVE | OUTPATIENT
Start: 2024-03-28 | End: 2024-03-28

## 2024-03-28 RX ORDER — METOPROLOL TARTRATE 50 MG/1
50 TABLET, FILM COATED ORAL ONCE AS NEEDED
Status: DISCONTINUED | OUTPATIENT
Start: 2024-03-29 | End: 2024-03-29

## 2024-03-28 RX ORDER — METOPROLOL TARTRATE 50 MG/1
50 TABLET, FILM COATED ORAL ONCE AS NEEDED
Status: DISCONTINUED | OUTPATIENT
Start: 2024-03-28 | End: 2024-04-03

## 2024-03-28 RX ORDER — METOPROLOL TARTRATE 50 MG/1
100 TABLET, FILM COATED ORAL ONCE AS NEEDED
Status: DISCONTINUED | OUTPATIENT
Start: 2024-03-29 | End: 2024-03-29

## 2024-03-28 RX ORDER — METOPROLOL TARTRATE 50 MG/1
100 TABLET, FILM COATED ORAL ONCE
Status: COMPLETED | OUTPATIENT
Start: 2024-03-28 | End: 2024-03-28

## 2024-03-28 RX ORDER — OSELTAMIVIR PHOSPHATE 75 MG/1
75 CAPSULE ORAL EVERY 12 HOURS SCHEDULED
Status: DISCONTINUED | OUTPATIENT
Start: 2024-03-28 | End: 2024-03-29

## 2024-03-28 RX ORDER — METOPROLOL TARTRATE 50 MG/1
100 TABLET, FILM COATED ORAL ONCE AS NEEDED
Status: ACTIVE | OUTPATIENT
Start: 2024-03-28 | End: 2024-03-28

## 2024-03-28 RX ORDER — LOSARTAN POTASSIUM 25 MG/1
25 TABLET ORAL DAILY
Status: DISCONTINUED | OUTPATIENT
Start: 2024-03-28 | End: 2024-04-03

## 2024-03-28 RX ORDER — METHYLPREDNISOLONE SODIUM SUCCINATE 125 MG/2ML
80 INJECTION, POWDER, LYOPHILIZED, FOR SOLUTION INTRAMUSCULAR; INTRAVENOUS EVERY 8 HOURS
Status: DISCONTINUED | OUTPATIENT
Start: 2024-03-28 | End: 2024-03-29

## 2024-03-28 RX ORDER — AZITHROMYCIN 250 MG/1
500 TABLET, FILM COATED ORAL DAILY
Status: COMPLETED | OUTPATIENT
Start: 2024-03-29 | End: 2024-03-30

## 2024-03-28 RX ORDER — ENOXAPARIN SODIUM 100 MG/ML
40 INJECTION SUBCUTANEOUS DAILY
Status: DISCONTINUED | OUTPATIENT
Start: 2024-03-28 | End: 2024-04-03

## 2024-03-28 RX ORDER — METOPROLOL TARTRATE 50 MG/1
50 TABLET, FILM COATED ORAL ONCE
Status: DISCONTINUED | OUTPATIENT
Start: 2024-03-29 | End: 2024-04-03

## 2024-03-28 RX ORDER — OSELTAMIVIR PHOSPHATE 30 MG/1
30 CAPSULE ORAL ONCE
Status: COMPLETED | OUTPATIENT
Start: 2024-03-28 | End: 2024-03-28

## 2024-03-28 RX ORDER — METOPROLOL TARTRATE 100 MG/1
100 TABLET ORAL ONCE
Status: DISCONTINUED | OUTPATIENT
Start: 2024-03-29 | End: 2024-04-03

## 2024-03-28 RX ORDER — METOPROLOL TARTRATE 50 MG/1
50 TABLET, FILM COATED ORAL ONCE
Status: COMPLETED | OUTPATIENT
Start: 2024-03-28 | End: 2024-03-28

## 2024-03-28 NOTE — CONSULTS
[facility]  Cardiology Consultation    Pancho Arellano Patient Status:  Inpatient    1965 MRN [id number]   Location [facility] 4SW-A Attending Pam Patel, DO   Hosp Day # 1 PCP Jose Mills MD     Reason for Consultation:  Low EF, new    History of Present Illness:  Pancho Arellano is a a(n) 58 year old male with no prior cardiac hx of who presents with worsening TRIVEDI and fatigue since early February.  More recetnly with fever/chills and poor PO intake.  Has noticed 4 lbs wt loss.      Chest imaging suggestive of PNA/influenza requiring ICU admission.  No prior cardiac hx.  CT chest negative for PE, incdientally no significant coronary calcifications present on the study.    Personal hx significant for 12 yr history of peritoneal Ca requiring 3 debredmient surgeries.  Due to metastatic nature of dz not a candidate for chemo or radiation.    We were asked to see for abnormal echo with LVEF 30-35% with apical hypokinesis  ProBNP 2,334  Lactic acid 2  D-dimer 1.46 elevated with CTA chest negative for PE  ECG with ?inferior q waves/poor r wave progression in the precordial leads    Family hx of DM2 both parents; brother with idiopathic CM requiring PPM/ICD in recent past.    History:  Past Medical History:   Diagnosis Date    Back problem     nerve pain.  Left side rib cage    Calculus of kidney     Cancer (HCC)         Depression     20 yrs ago    GERD     GOUT     Pseudomyxoma peritonei (HCC)     Visual impairment     glasses     Past Surgical History:   Procedure Laterality Date    COLONOSCOPY      COLONOSCOPY & POLYPECTOMY      polyps; no cancer; repeat 3 yrs    COLONOSCOPY & POLYPECTOMY      polyp; repeat 5 yrs (hyperplastic but hx of adenoma)    COLONOSCOPY,BIOPSY N/A 2016    Procedure: COLONOSCOPY, POSSIBLE BIOPSY, POSSIBLE POLYPECTOMY 42404;  Surgeon: Awais Scott MD;  Location: [facility] [place]    COLONOSCOPY,REMV LESN,SNARE  2012     Procedure: ESOPHAGOGASTRODUODENOSCOPY, COLONOSCOPY, POSSIBLE BIOPSY, POSSIBLE POLYPECTOMY 55853,90585;  Surgeon: Awais Scott MD;  Location: [facility]    COLONOSCPY, FLEXIBLE, PROXIMAL TO SPLENIC FLEXURE; W/DIRECTED SUBMUCOSA INJECTION(S), ANY SUBSTANCE  11/5/2012    Procedure: ESOPHAGOGASTRODUODENOSCOPY, COLONOSCOPY, POSSIBLE BIOPSY, POSSIBLE POLYPECTOMY 62919,85071;  Surgeon: Awais Scott MD;  Location: [facility]    CYSTOSCOPY,REMV CALCULUS,SIMPLE  11/20/2012    Procedure: CYSTOSCOPY WITH REMOVAL OF STENT;  Surgeon: Gerard Taylor MD;  Location: [facility]    FLUOR GID & LOCLZJ NDL/CATH SPI DX/THER NJX  3/27/2014    Procedure: LUMBAR EPIDURAL;  Surgeon: Geovanny Ricci MD;  Location: [facility]    FLUOR GID & LOCLZJ NDL/CATH SPI DX/THER NJX  4/15/2014    Procedure: LUMBAR EPIDURAL;  Surgeon: Geovanny Ricci MD;  Location: [facility]    FLUOR GID & LOCLZJ NDL/CATH SPI DX/THER NJX  4/29/2014    Procedure: LUMBAR EPIDURAL;  Surgeon: Geovanny Ricci MD;  Location: [facility]    FRAGMENTING OF KIDNEY STONE  11/20/2012    Procedure: LITHOTRIPSY WITH CYSTOSCOPY, STENT PLACEMENT;  Surgeon: Gerard Taylor MD;  Location: [facility]    GASTRO - DMG  11/12    normal    INJECTION, W/WO CONTRAST, DX/THERAPEUTIC SUBSTANCE, EPIDURAL/SUBARACHNOID; LUMBAR/SACRAL  3/27/2014    Procedure: LUMBAR EPIDURAL;  Surgeon: Geovanny Ricci MD;  Location: [facility]    INJECTION, W/WO CONTRAST, DX/THERAPEUTIC SUBSTANCE, EPIDURAL/SUBARACHNOID; LUMBAR/SACRAL  4/15/2014    Procedure: LUMBAR EPIDURAL;  Surgeon: Geovanny Ricci MD;  Location: [facility]    INJECTION, W/WO CONTRAST, DX/THERAPEUTIC SUBSTANCE, EPIDURAL/SUBARACHNOID; LUMBAR/SACRAL  4/29/2014    Procedure: LUMBAR EPIDURAL;  Surgeon: Geovanny Ricci MD;  Location: [facility]    IR URETER TUBE  REMOVAL VIA ILEAL CONDUIT      LITHOTRIPSY  11/20/12    OTHER SURGICAL HISTORY      bilat knee scopes/multiple each knee    OTHER SURGICAL HISTORY      multiple ankle procedures/skin surgeries    OTHER SURGICAL HISTORY  10-24-12    sx-EDW-Left ureteral stone, left pyelonephritisi-Dr. Taylor    OTHER SURGICAL HISTORY  1/2013     Pseuodmyxoma Peritoneii stage IV s/p DAISY, omenentectomy, right hemicolectomy, and intraperitoneal chemotherapy 01/2013    OTHER SURGICAL HISTORY  07/22/13    Cystoscopy with Stent Removal - Dr. Taylor    OTHER SURGICAL HISTORY  11/23/2014    Exp lap, extensive cytoreduction of abdomina tumor with peritoneal stripping, partial greater omentectomy,  HIPEC with mitomycin C     OTHER SURGICAL HISTORY  11/19/2019    Cytoreductive surgery, Partial hepatectomy, Partial resection of left diaphragm, Small-bowel resection, Hyperthermic intraperitoneal chemotherapy with mitomycin C    PATIENT DOCUMENTED NOT TO HAVE EXPERIENCED ANY OF THE FOLLOWING EVENTS  11/20/2012    Procedure: CYSTOSCOPY WITH REMOVAL OF STENT;  Surgeon: Gerard Taylor MD;  Location: [facility]    PATIENT DOCUMENTED NOT TO HAVE EXPERIENCED ANY OF THE FOLLOWING EVENTS  11/20/2012    Procedure: CYSTOSCOPY WITH REMOVAL OF STENT;  Surgeon: Gerard Taylor MD;  Location: [facility]    PATIENT DOCUMENTED NOT TO HAVE EXPERIENCED ANY OF THE FOLLOWING EVENTS  3/27/2014    Procedure: LUMBAR EPIDURAL;  Surgeon: Geovanny Ricci MD;  Location: [facility]    PATIENT DOCUMENTED NOT TO HAVE EXPERIENCED ANY OF THE FOLLOWING EVENTS  4/15/2014    Procedure: LUMBAR EPIDURAL;  Surgeon: Geovanny Ricci MD;  Location: [facility]    PATIENT DOCUMENTED NOT TO HAVE EXPERIENCED ANY OF THE FOLLOWING EVENTS  4/29/2014    Procedure: LUMBAR EPIDURAL;  Surgeon: Geovanny Ricci MD;  Location: [facility]    PATIENT DOCUMENTED NOT TO HAVE EXPERIENCED ANY OF THE FOLLOWING EVENTS N/A  1/25/2016    Procedure: COLONOSCOPY, POSSIBLE BIOPSY, POSSIBLE POLYPECTOMY 21358;  Surgeon: Awais Scott MD;  Location: [facility]    PATIENT WITH PREOPERATIVE ORDER FOR IV ANTIBIOTIC SURGICAL SITE INFECT  11/20/2012    Procedure: CYSTOSCOPY WITH REMOVAL OF STENT;  Surgeon: Gerard Taylor MD;  Location: [facility]    PATIENT WITHOUGH PREOPERATIVE ORDER FOR IV ANTIBIOTIC SURGICAL SITE INFECTION PROPHYLAXIS.  11/5/2012    Procedure: ESOPHAGOGASTRODUODENOSCOPY, COLONOSCOPY, POSSIBLE BIOPSY, POSSIBLE POLYPECTOMY 71245,21323;  Surgeon: Awais Scott MD;  Location: [facility]    PATIENT WITHOUGH PREOPERATIVE ORDER FOR IV ANTIBIOTIC SURGICAL SITE INFECTION PROPHYLAXIS.  3/27/2014    Procedure: LUMBAR EPIDURAL;  Surgeon: Geovanny Ricci MD;  Location: [facility]    PATIENT WITHOUGH PREOPERATIVE ORDER FOR IV ANTIBIOTIC SURGICAL SITE INFECTION PROPHYLAXIS.  4/15/2014    Procedure: LUMBAR EPIDURAL;  Surgeon: Geovanny Ricci MD;  Location: [facility]    PATIENT WITHOUGH PREOPERATIVE ORDER FOR IV ANTIBIOTIC SURGICAL SITE INFECTION PROPHYLAXIS.  4/29/2014    Procedure: LUMBAR EPIDURAL;  Surgeon: Geovanny Ricci MD;  Location: [facility]    PATIENT WITHOUGH PREOPERATIVE ORDER FOR IV ANTIBIOTIC SURGICAL SITE INFECTION PROPHYLAXIS. N/A 1/25/2016    Procedure: COLONOSCOPY, POSSIBLE BIOPSY, POSSIBLE POLYPECTOMY 05711;  Surgeon: Awais Scott MD;  Location: [facility]    UPPER GI ENDOSCOPY,DIAGNOSIS  11/5/2012    Procedure: ESOPHAGOGASTRODUODENOSCOPY, COLONOSCOPY, POSSIBLE BIOPSY, POSSIBLE POLYPECTOMY 20008,94441;  Surgeon: Awais Scott MD;  Location: [facility]     Family History   Problem Relation Age of Onset    Cancer Maternal Grandmother     Diabetes Father     Diabetes Mother     Diabetes Brother       reports that he quit smoking about 7 years ago. His smoking use included  cigarettes and cigars. He has a 24 pack-year smoking history. He has never used smokeless tobacco. He reports that he does not drink alcohol and does not use drugs.    Allergies:  Allergies   Allergen Reactions    Magnevist [Gadopentetate Dimeglumine] ANGIOEDEMA     MRI CONTRAST ALLERGY  1/2/18: pt states he got significant HA w/contrast injection, requiring hydration and hospitalization in past. NO problem w/CT contrast dye. CKRN     Morphine NAUSEA ONLY       Medications:    Current Facility-Administered Medications:     ipratropium-albuterol (Duoneb) 0.5-2.5 (3) MG/3ML inhalation solution 3 mL, 3 mL, Nebulization, 6 times per day    arformoterol (Brovana) 15 MCG/2ML nebulizer solution 15 mcg, 15 mcg, Nebulization, 2 times daily    budesonide (Pulmicort) 0.5 MG/2ML nebulizer suspension 0.5 mg, 0.5 mg, Nebulization, BID    oseltamivir (Tamiflu) cap 75 mg, 75 mg, Oral, Q12H    methylPREDNISolone sodium succinate (Solu-MEDROL) injection 80 mg, 80 mg, Intravenous, Q8H    cefTRIAXone (Rocephin) 2 g in D5W 100 mL IVPB-ADD, 2 g, Intravenous, Q24H    enoxaparin (Lovenox) 40 MG/0.4ML SUBQ injection 40 mg, 40 mg, Subcutaneous, Daily    [START ON 3/29/2024] azithromycin (Zithromax) tab 500 mg, 500 mg, Oral, Daily    guaiFENesin ER (Mucinex) 12 hr tab 600 mg, 600 mg, Oral, BID    benzonatate (Tessalon) cap 200 mg, 200 mg, Oral, TID PRN    guaiFENesin (Robitussin) 100 MG/5 ML oral liquid 100 mg, 100 mg, Oral, Q4H PRN    acetaminophen (Tylenol Extra Strength) tab 500 mg, 500 mg, Oral, Q4H PRN    ondansetron (Zofran) 4 MG/2ML injection 4 mg, 4 mg, Intravenous, Q6H PRN    melatonin tab 3 mg, 3 mg, Oral, Nightly PRN    docusate sodium (Colace) cap 100 mg, 100 mg, Oral, BID PRN    polyethylene glycol (PEG 3350) (Miralax) 17 g oral packet 17 g, 17 g, Oral, Daily PRN    bisacodyl (Dulcolax) 10 MG rectal suppository 10 mg, 10 mg, Rectal, Daily PRN    glycerin-hypromellose- (Artifical Tears) 0.2-0.2-1 % ophthalmic solution 1  drop, 1 drop, Both Eyes, QID PRN    sodium chloride (Saline Mist) 0.65 % nasal solution 1 spray, 1 spray, Each Nare, Q3H PRN    febuxostat (Uloric) tab 80 mg, 80 mg, Oral, Daily    oxyCODONE ER (OxyCONTIN ER) 12 hr tab 40 mg, 40 mg, Oral, 2 times per day    Review of Systems:  A comprehensive review of systems was negative if not otherwise mention in above HPI.    /65 (BP Location: Left arm)   Pulse 89   Temp 99.7 °F (37.6 °C) (Temporal)   Resp 24   Wt 235 lb 0.2 oz   SpO2 94%   BMI 31.01 kg/m²   Temp (24hrs), Av.2 °F (37.3 °C), Min:98.5 °F (36.9 °C), Max:99.7 °F (37.6 °C)       Intake/Output Summary (Last 24 hours) at 3/28/2024 1730  Last data filed at 3/28/2024 1641  Gross per 24 hour   Intake 2335.1 ml   Output 850 ml   Net 1485.1 ml     Wt Readings from Last 3 Encounters:   24 235 lb 0.2 oz   24 237 lb   23 246 lb       Physical Exam:   General: Alert and oriented x 3. No apparent distress. No respiratory or constitutional distress.  HEENT: Normocephalic, anicteric sclera, neck supple.  Neck: No JVD   Cardiac: Regular rate and rhythm. S1, S2 normal. No murmur   Lungs: Decreased breath sounds at bases  Extremities: Without edema  Neurologic: Alert and oriented, normal affect.  Skin: Warm and dry.     Laboratory Data:  Lab Results   Component Value Date    WBC 14.7 2024    HGB 13.9 2024    HCT 41.6 2024    .0 2024    CREATSERUM 1.48 2024    BUN 19 2024     2024    K 4.7 2024     2024    CO2 24.0 2024    GLU 97 2024    CA 8.1 2024    DDIMER 1.46 2024       Imaging:  CTA chest for PE  1. No evidence of pulmonary embolism. No significant coronary calcifications  2. Bilateral lower lobe consolidative opacities, left greater than right.  Findings may be related to pneumonia and/or aspiration.  Clinical correlation follow-up to resolution is recommended.   3. Mediastinal and hilar  lymphadenopathy as detailed, possibly reactive, though follow-up is recommended to assess for resolution and exclude other etiologies.     Impression:  Principal Problem:    Influenza B  Active Problems:    Hyponatremia    Acute kidney injury (HCC)    Metabolic alkalosis    Hyperglycemia    Hypoxemia    Newly diagnosed CM in setting of suspected aspiration PNA/Influenza point to stress induced CM    Recommendations:  - treat PNA/sepsis/infulenza  - check HS troponin  - start low dose BB and ARB with hold parameters  - gated coronary CTA without CACS tomorrow to rule out CAD  - consider repeat echo later during the hospitalization    Thank you for allowing me to participate in the care of your patient.    Tha Martin MD  3/28/2024  5:30 PM

## 2024-03-28 NOTE — PROGRESS NOTES
Pharmacy Note:  Renal Adjustment for oseltamivir (TAMIFLU)         Pancho Arellano is a 58 year old male who has been prescribed oseltamivir 30mg q12hr.      Est CrCl: >60 mL/min (LORE on admission -- improving)    Plan:  Adjusted to oseltamivir 75mg q12hr per hospital renal dose adjustment protocol.  Added an extra 30mg to this morning's 30mg dose & will start 75mg dose tonight.   Ordered ceftriaxone 2g q24hr x7 days & azithromycin 500mg q24hr x3 days for suspected secondary bacterial PNA per d/w Dr Collins     Pharmacy will follow and adjust dose as warranted for renal function changes.    Thank you,      Kyler Donahue, PharmD, BCPS  3/28/2024  10:49 AM

## 2024-03-28 NOTE — PROGRESS NOTES
[facility]   part of [facility]     Hospitalist Progress Note     Pancho Arellano Patient Status:  Inpatient    1965 MRN [id number]   Location [facility] 4SW-A Attending Destin Walker,    Hosp Day # 1 PCP Jose Mills MD     Chief Complaint: cough, SOB    Subjective:     Patient O2 requirements improving, continues to have productive cough. Playing cards with brother at bedside    Objective:    Review of Systems:   A comprehensive review of systems was completed; pertinent positive and negatives stated in subjective.    Vital signs:  Temp:  [98.5 °F (36.9 °C)-99.7 °F (37.6 °C)] 99.7 °F (37.6 °C)  Pulse:  [73-93] 89  Resp:  [16-32] 24  BP: (100-122)/(28-72) 117/65  SpO2:  [88 %-100 %] 94 %  FiO2 (%):  [30 %-100 %] 30 %    Physical Exam:    General: No acute distress  Respiratory: No wheezes, no rhonchi, coarse throughout  Cardiovascular: S1, S2, regular rate and rhythm  Abdomen: Soft, Non-tender, non-distended, positive bowel sounds  Neuro: No new focal deficits.   Extremities: No edema      Diagnostic Data:    Labs:  Recent Labs   Lab 24  1334 24  0450   WBC 11.8* 14.7*   HGB 17.5 13.9   MCV 96.9 97.4   .0 147.0*       Recent Labs   Lab 24  1334 24  0450   * 97   BUN 18 19   CREATSERUM 2.04* 1.48*   CA 9.3 8.1*   ALB 4.2  --     137   K 4.2 4.7    111   CO2 26.0 24.0   ALKPHO 96  --    AST 29  --    ALT 34  --    BILT 0.4  --    TP 8.5*  --        Estimated Creatinine Clearance: 60.7 mL/min (A) (based on SCr of 1.48 mg/dL (H)).    No results for input(s): \"TROP\", \"TROPHS\", \"CK\" in the last 168 hours.    No results for input(s): \"PTP\", \"INR\" in the last 168 hours.               Microbiology    No results found for this visit on 24.      Imaging: Reviewed in Epic.    Medications:    ipratropium-albuterol  3 mL Nebulization 6 times per day    arformoterol  15 mcg Nebulization 2 times daily    budesonide  0.5 mg Nebulization BID     oseltamivir  75 mg Oral Q12H    methylPREDNISolone  80 mg Intravenous Q8H    cefTRIAXone  2 g Intravenous Q24H    enoxaparin  40 mg Subcutaneous Daily    [START ON 3/29/2024] azithromycin  500 mg Oral Daily    guaiFENesin ER  600 mg Oral BID    febuxostat  80 mg Oral Daily    oxyCODONE ER  40 mg Oral 2 times per day       Assessment & Plan:      #Acute hypoxic respiratory failure due to influenza B infection, BLL PNA  -Wean oxygen as tolerated  -PRN nebs  -IV steroids, inhalers  -Pulm following     #Influenza B infection  -Tamiflu  -Cough medications     #Sepsis POA likely due to influenza infection, superimposed bacterial infection  -S/p sepsis bolus on admission  -IVF  -Follow blood cultures  -empiric ceftriaxone/azithromycin     #LORE on CKD stage III  -IVF     #Pseudomyxoma peritonei   -Sees Dr. Tapia and is doing well clinically and radiographically.  Most recent CT scans were stable findings and tumor markers were unremarkable.  He follows up every 6 months  -Resume home pain meds    #Gouty arthritis  -Follows with Dr. Zohreh Amin  -Chapin continued      Pam Patel DO    Supplementary Documentation:     Quality:  DVT Mechanical Prophylaxis:   SCDs,    DVT Pharmacologic Prophylaxis   Medication    enoxaparin (Lovenox) 40 MG/0.4ML SUBQ injection 40 mg                Code Status: Full Code  Knott: No urinary catheter in place  Knott Duration (in days):   Central line:    IRVING:     Discharge is dependent on: clinical state  At this point Mr. Arellano is expected to be discharge to: TBD    The 21st Century Cures Act makes medical notes like these available to patients in the interest of transparency. Please be advised this is a medical document. Medical documents are intended to carry relevant information, facts as evident, and the clinical opinion of the practitioner. The medical note is intended as peer to peer communication and may appear blunt or direct. It is written in medical language and may contain  abbreviations or verbiage that are unfamiliar.             **Certification      PHYSICIAN Certification of Need for Inpatient Hospitalization - Initial Certification    Patient will require inpatient services that will reasonably be expected to span two midnight's based on the clinical documentation in H+P.   Based on patients current state of illness, I anticipate that, after discharge, patient will require TBD.

## 2024-03-28 NOTE — PROGRESS NOTES
ICU  Critical Care APRN Progress Note    NAME: Pancho Arellano - ROOM: A7/A7 - MRN: [id number] - Age: 58 year old - :1965    Subjective: No acute events overnight.  Still requiring high oxygen.    OBJECTIVE  Vitals:  /62   Pulse 76   Temp 98.6 °F (37 °C)   Resp 18   Wt 235 lb 0.2 oz (106.6 kg)   SpO2 93%   BMI 31.01 kg/m²                Physical Exam:    General Appearance: Alert, cooperative, no distress, appears stated age  Neck: No JVD, neck supple, no adenopathy, trachea midline, no carotid bruits  Lungs: Clear to auscultation bilaterally, respirations unlabored  Heart: Regular rate and rhythm, S1 and S2 normal, no murmur, rub or gallop  Abdomen: Soft, non-tender, bowel sounds active all four quadrants, no masses, no organomegaly  Extremities: Extremities normal, atraumatic, no cyanosis or edema,capillary refill <3 sec.    Pulses: 2+ and symmetric all extremities  Skin: Skin color, texture, turgor normal for ethnicity, no rashes or lesions, warm and dry  Neurologic: CNII-XII intact, normal strength    Data this admission:  XR CHEST AP PORTABLE  (CPT=71045)    Result Date: 3/27/2024  CONCLUSION:  There is no acute abnormality detected on this single portable chest radiograph.   LOCATION:  [facility]      Dictated by (CST): Ronni Mercado MD on 3/27/2024 at 2:58 PM     Finalized by (CST): Ronni Mercado MD on 3/27/2024 at 2:59 PM         Labs:  Lab Results   Component Value Date    WBC 14.7 2024    HGB 13.9 2024    HCT 41.6 2024    .0 2024    CREATSERUM 1.48 2024    BUN 19 2024     2024    K 4.7 2024     2024    CO2 24.0 2024    GLU 97 2024    CA 8.1 2024    ALB 4.2 2024    ALKPHO 96 2024    BILT 0.4 2024    TP 8.5 2024    AST 29 2024    ALT 34 2024       Assessment/Plan:    Acute respiratory failure 2/2 influenza B  -place on bipap as needed  -wean O2 as able  -cough  medication  -nebulizer as needed  -CTA chest pending  -US BLE pending  -Solumedrol 80mg q8H   -Rocephin and Azithromycin started    Malignant pseudomyxoma peritonei  -follows with Dr. Tapia  -under surveillance with follow up in 6 months    Gout  -continue Febuxostat    Depression  -continue home medications    GERD  -continue home medications    F/E/N  -regular diet  -replete electrolytes as needed    Proph  -Lovenox  -SCD    Dispo  -Full code  -ICU for risk for decompensation      Plan of care discussed with intensivist on-call, Dr. Collins.    Patricia Rao, [facility]  Critical Care NP  Phone [phone]      INTENSIVIST ADDENDUM      I agree with critical care APN note above. I have independently seen & evaluated the patient.  I agree with the management plan outlined above with the following changes/additions:      CTA chest 3/28/2024  1. No evidence of pulmonary embolism.   2. Bilateral lower lobe consolidative opacities, left greater than right.  Findings may be related to pneumonia and/or aspiration.  Clinical correlation follow-up to resolution is recommended.   3. Mediastinal and hilar lymphadenopathy as detailed, possibly reactive, though follow-up is recommended to assess for resolution and exclude other etiologies.      PE w/u negative. Findings c/w bronchosapsm/RADS with post influenza pneumonia.     Plan  Continue with abx. Changed to rocephin/azithro.   Steroids.   Add brovana/pulmicort.   ICU monitoring today.     35 mins spent in critical care time, reviewing data, and discussion with family and treatment team.     On call Intensivist 03/28/24      My best regards,         Navi Collins MD  [facility] Pulmonary, Critical Care and Sleep Medicine

## 2024-03-28 NOTE — PLAN OF CARE
Received pt at the change of shift. Pt A&Ox4, follows commands. On vapotherm 30L 90%. NSR on tele. No complaints of shortness of pain. Pt does get dyspnea with exertion. Chronic abdominal pain. Uses urinal. Up to commode, loose BM- sample sent. Isolation precautions in place. SCDs on. Awaiting echo, and lower extremity dopplers. Call light within reach. Questions answered. See flow sheets for further information.

## 2024-03-28 NOTE — PLAN OF CARE
Patient weaned to 2L NC. Afebrile. BP stable. Abd tenderness r/t scar tissue per patient. Loose BMs - cdiff (-). CTA chest completed - see results. BLE dopplers done - see results.

## 2024-03-29 ENCOUNTER — APPOINTMENT (OUTPATIENT)
Dept: CT IMAGING | Facility: HOSPITAL | Age: 59
End: 2024-03-29
Attending: NURSE PRACTITIONER
Payer: MEDICARE

## 2024-03-29 PROBLEM — J96.01 ACUTE RESPIRATORY FAILURE WITH HYPOXIA (HCC): Status: ACTIVE | Noted: 2024-03-29

## 2024-03-29 LAB
ANION GAP SERPL CALC-SCNC: 5 MMOL/L (ref 0–18)
BASOPHILS # BLD AUTO: 0.03 X10(3) UL (ref 0–0.2)
BASOPHILS NFR BLD AUTO: 0.3 %
BUN BLD-MCNC: 22 MG/DL (ref 9–23)
CALCIUM BLD-MCNC: 9.6 MG/DL (ref 8.5–10.1)
CHLORIDE SERPL-SCNC: 107 MMOL/L (ref 98–112)
CO2 SERPL-SCNC: 23 MMOL/L (ref 21–32)
CREAT BLD-MCNC: 1.83 MG/DL
EGFRCR SERPLBLD CKD-EPI 2021: 42 ML/MIN/1.73M2 (ref 60–?)
EOSINOPHIL # BLD AUTO: 0 X10(3) UL (ref 0–0.7)
EOSINOPHIL NFR BLD AUTO: 0 %
ERYTHROCYTE [DISTWIDTH] IN BLOOD BY AUTOMATED COUNT: 12.3 %
GLUCOSE BLD-MCNC: 164 MG/DL (ref 70–99)
HCT VFR BLD AUTO: 46.8 %
HGB BLD-MCNC: 15.2 G/DL
IMM GRANULOCYTES # BLD AUTO: 0.04 X10(3) UL (ref 0–1)
IMM GRANULOCYTES NFR BLD: 0.4 %
LYMPHOCYTES # BLD AUTO: 1.29 X10(3) UL (ref 1–4)
LYMPHOCYTES NFR BLD AUTO: 12.6 %
MCH RBC QN AUTO: 32.2 PG (ref 26–34)
MCHC RBC AUTO-ENTMCNC: 32.5 G/DL (ref 31–37)
MCV RBC AUTO: 99.2 FL
MONOCYTES # BLD AUTO: 0.32 X10(3) UL (ref 0.1–1)
MONOCYTES NFR BLD AUTO: 3.1 %
NEUTROPHILS # BLD AUTO: 8.52 X10 (3) UL (ref 1.5–7.7)
NEUTROPHILS # BLD AUTO: 8.52 X10(3) UL (ref 1.5–7.7)
NEUTROPHILS NFR BLD AUTO: 83.6 %
OSMOLALITY SERPL CALC.SUM OF ELEC: 287 MOSM/KG (ref 275–295)
PLATELET # BLD AUTO: 143 10(3)UL (ref 150–450)
PLATELETS.RETICULATED NFR BLD AUTO: 6.7 % (ref 0–7)
POTASSIUM SERPL-SCNC: 5 MMOL/L (ref 3.5–5.1)
POTASSIUM SERPL-SCNC: 5.3 MMOL/L (ref 3.5–5.1)
POTASSIUM SERPL-SCNC: 5.6 MMOL/L (ref 3.5–5.1)
RBC # BLD AUTO: 4.72 X10(6)UL
SODIUM SERPL-SCNC: 135 MMOL/L (ref 136–145)
TROPONIN I SERPL HS-MCNC: 19 NG/L
WBC # BLD AUTO: 10.2 X10(3) UL (ref 4–11)

## 2024-03-29 PROCEDURE — 99233 SBSQ HOSP IP/OBS HIGH 50: CPT | Performed by: INTERNAL MEDICINE

## 2024-03-29 PROCEDURE — 75574 CT ANGIO HRT W/3D IMAGE: CPT | Performed by: NURSE PRACTITIONER

## 2024-03-29 PROCEDURE — 99232 SBSQ HOSP IP/OBS MODERATE 35: CPT | Performed by: INTERNAL MEDICINE

## 2024-03-29 RX ORDER — METOPROLOL SUCCINATE 25 MG/1
12.5 TABLET, EXTENDED RELEASE ORAL
Qty: 60 TABLET | Refills: 1 | Status: SHIPPED | OUTPATIENT
Start: 2024-03-30

## 2024-03-29 RX ORDER — METHYLPREDNISOLONE SODIUM SUCCINATE 40 MG/ML
40 INJECTION, POWDER, LYOPHILIZED, FOR SOLUTION INTRAMUSCULAR; INTRAVENOUS ONCE
Qty: 1 ML | Refills: 0 | Status: DISCONTINUED | OUTPATIENT
Start: 2024-03-31 | End: 2024-03-30

## 2024-03-29 RX ORDER — LOSARTAN POTASSIUM 25 MG/1
25 TABLET ORAL DAILY
Qty: 30 TABLET | Refills: 1 | Status: SHIPPED | OUTPATIENT
Start: 2024-03-30

## 2024-03-29 RX ORDER — NITROGLYCERIN 0.4 MG/1
0.4 TABLET SUBLINGUAL ONCE
Status: COMPLETED | OUTPATIENT
Start: 2024-03-29 | End: 2024-03-29

## 2024-03-29 RX ORDER — METHYLPREDNISOLONE SODIUM SUCCINATE 40 MG/ML
40 INJECTION, POWDER, LYOPHILIZED, FOR SOLUTION INTRAMUSCULAR; INTRAVENOUS EVERY 12 HOURS
Qty: 3 ML | Refills: 0 | Status: COMPLETED | OUTPATIENT
Start: 2024-03-29 | End: 2024-03-30

## 2024-03-29 RX ORDER — DILTIAZEM HYDROCHLORIDE 5 MG/ML
5 INJECTION INTRAVENOUS SEE ADMIN INSTRUCTIONS
Status: DISCONTINUED | OUTPATIENT
Start: 2024-03-29 | End: 2024-04-03

## 2024-03-29 RX ORDER — NITROGLYCERIN 0.4 MG/1
TABLET SUBLINGUAL
Status: COMPLETED
Start: 2024-03-29 | End: 2024-03-29

## 2024-03-29 RX ORDER — METOPROLOL TARTRATE 1 MG/ML
INJECTION, SOLUTION INTRAVENOUS
Status: COMPLETED
Start: 2024-03-29 | End: 2024-03-29

## 2024-03-29 RX ORDER — METOPROLOL TARTRATE 1 MG/ML
5 INJECTION, SOLUTION INTRAVENOUS SEE ADMIN INSTRUCTIONS
Status: DISCONTINUED | OUTPATIENT
Start: 2024-03-29 | End: 2024-04-03

## 2024-03-29 RX ORDER — BUDESONIDE 0.5 MG/2ML
0.5 INHALANT ORAL 2 TIMES DAILY
Status: DISCONTINUED | OUTPATIENT
Start: 2024-03-29 | End: 2024-03-30

## 2024-03-29 RX ORDER — OSELTAMIVIR PHOSPHATE 30 MG/1
30 CAPSULE ORAL EVERY 12 HOURS SCHEDULED
Status: COMPLETED | OUTPATIENT
Start: 2024-03-29 | End: 2024-04-01

## 2024-03-29 NOTE — PLAN OF CARE
Received pt at the change of shift. Pt A&Ox4, follows commands. On 2LNC tolerating well. Cards APRN notified of losartan PO dosage- told to hold night dose and resume in AM. Metropolol dosage given according to heart rate and sbp parameters. Discussed with cards aprn regarding morning dosage. 18g IV placed for CT angio. Pending angio this AM. Up to bedside commode and tolerated well. Voiding via urinal. Call light within reach. See flowsheets for further information.

## 2024-03-29 NOTE — PROGRESS NOTES
[facility]   part of [facility]     Hospitalist Progress Note     Pancho Arellano Patient Status:  Inpatient    1965 MRN [id number]   Location [facility] 4SW-A Attending Destin Walker,    Hosp Day # 2 PCP Jose Mills MD     Chief Complaint: cough, SOB    Subjective:     Feeling better, no new complaints. Denies any chest pain/pressure    Objective:    Review of Systems:   A comprehensive review of systems was completed; pertinent positive and negatives stated in subjective.    Vital signs:  Temp:  [97.2 °F (36.2 °C)-99.7 °F (37.6 °C)] 97.7 °F (36.5 °C)  Pulse:  [56-89] 71  Resp:  [13-24] 18  BP: ()/(52-84) 118/72  SpO2:  [87 %-100 %] 96 %    Physical Exam:    General: No acute distress  Respiratory: No wheezes, no rhonchi, coarse throughout  Cardiovascular: S1, S2, regular rate and rhythm  Abdomen: Soft, Non-tender, non-distended, positive bowel sounds  Neuro: No new focal deficits.   Extremities: No edema      Diagnostic Data:    Labs:  Recent Labs   Lab 24  1334 24  0450 24  0441   WBC 11.8* 14.7* 10.2   HGB 17.5 13.9 15.2   MCV 96.9 97.4 99.2   .0 147.0* 143.0*       Recent Labs   Lab 24  1334 24  0450 24  0441 24  0716 24  1151   * 97 164*  --   --    BUN 18 19 22  --   --    CREATSERUM 2.04* 1.48* 1.83*  --   --    CA 9.3 8.1* 9.6  --   --    ALB 4.2  --   --   --   --     137 135*  --   --    K 4.2 4.7 5.6* 5.3* 5.0    111 107  --   --    CO2 26.0 24.0 23.0  --   --    ALKPHO 96  --   --   --   --    AST 29  --   --   --   --    ALT 34  --   --   --   --    BILT 0.4  --   --   --   --    TP 8.5*  --   --   --   --        Estimated Creatinine Clearance: 49.1 mL/min (A) (based on SCr of 1.83 mg/dL (H)).    Recent Labs   Lab 24  0441   TROPHS 19       No results for input(s): \"PTP\", \"INR\" in the last 168 hours.               Microbiology    Hospital Encounter on 24   1. Blood Culture      Status: None (Preliminary result)    Collection Time: 03/27/24  1:33 PM    Specimen: Blood,peripheral   Result Value Ref Range    Blood Culture Result No Growth 1 Day N/A         Imaging: Reviewed in Epic.    Medications:    metoprolol  5 mg Intravenous See Admin Instructions    Or    dilTIAZem  5 mg Intravenous See Admin Instructions    oseltamivir  30 mg Oral Q12H    budesonide  0.5 mg Nebulization BID    methylPREDNISolone  40 mg Intravenous Q12H    Followed by    [START ON 3/31/2024] methylPREDNISolone  40 mg Intravenous Once    ipratropium-albuterol  3 mL Nebulization 6 times per day    arformoterol  15 mcg Nebulization 2 times daily    cefTRIAXone  2 g Intravenous Q24H    enoxaparin  40 mg Subcutaneous Daily    azithromycin  500 mg Oral Daily    metoprolol tartrate  50 mg Oral Once    Or    metoprolol tartrate  100 mg Oral Once    metoprolol succinate  12.5 mg Oral Daily Beta Blocker    [Held by provider] losartan  25 mg Oral Daily    guaiFENesin ER  600 mg Oral BID    febuxostat  80 mg Oral Daily    oxyCODONE ER  40 mg Oral 2 times per day       Assessment & Plan:      #Acute hypoxic respiratory failure due to influenza B infection, BLL PNA, new onset HFrEF  -Wean oxygen as tolerated  -PRN nebs  -IV steroids, inhalers  -Pulm following     #Influenza B infection  -Tamiflu  -Cough medications    #New onset HFrEF; may be d/t viral infection  - echo with EF 35%  - cardiology consulted  - CTA coronary arteries ordered  - plan to repeat echo later this admission  - metoprolol, losartan started      #Sepsis POA likely due to influenza infection, superimposed bacterial infection  -S/p sepsis bolus on admission  -IVF  -Follow blood cultures  -empiric ceftriaxone/azithromycin     #LORE on CKD stage III  -IVF     #Pseudomyxoma peritonei   -Sees Dr. Tapia and is doing well clinically and radiographically.  Most recent CT scans were stable findings and tumor markers were unremarkable.  He follows up every 6 months  -Resume  home pain meds    #Gouty arthritis  -Follows with Dr. Zohreh Amin  -Chapin continued      Pam Patel DO    Supplementary Documentation:     Quality:  DVT Mechanical Prophylaxis:   SCDs,    DVT Pharmacologic Prophylaxis   Medication    enoxaparin (Lovenox) 40 MG/0.4ML SUBQ injection 40 mg                Code Status: Full Code  Knott: No urinary catheter in place  Knott Duration (in days):   Central line:    IRVING:     Discharge is dependent on: clinical state  At this point Mr. Arellano is expected to be discharge to: TBD    The 21st Century Cures Act makes medical notes like these available to patients in the interest of transparency. Please be advised this is a medical document. Medical documents are intended to carry relevant information, facts as evident, and the clinical opinion of the practitioner. The medical note is intended as peer to peer communication and may appear blunt or direct. It is written in medical language and may contain abbreviations or verbiage that are unfamiliar.             **Certification      PHYSICIAN Certification of Need for Inpatient Hospitalization - Initial Certification    Patient will require inpatient services that will reasonably be expected to span two midnight's based on the clinical documentation in H+P.   Based on patients current state of illness, I anticipate that, after discharge, patient will require TBD.

## 2024-03-29 NOTE — PROGRESS NOTES
ICU  Critical Care APRN Progress Note    NAME: Pancho Arellano - ROOM: A7/A7 - MRN: [id number] - Age: 58 year old - :1965    Subjective: No acute events overnight.  Oxygen needs decreasing, currently on 2L    OBJECTIVE  Vitals:  /62   Pulse 66   Temp 97.5 °F (36.4 °C) (Temporal)   Resp 24   Wt 246 lb 11.1 oz (111.9 kg)   SpO2 91%   BMI 32.55 kg/m²                Physical Exam:    General Appearance: Alert, cooperative, no distress, appears stated age  Neck: No JVD, neck supple, no adenopathy, trachea midline, no carotid bruits  Lungs: Clear to auscultation bilaterally, respirations unlabored  Heart: Regular rate and rhythm, S1 and S2 normal, no murmur, rub or gallop  Abdomen: Soft, non-tender, bowel sounds active all four quadrants, no masses, no organomegaly  Extremities: Extremities normal, atraumatic, no cyanosis or edema,capillary refill <3 sec.    Pulses: 2+ and symmetric all extremities  Skin: Skin color, texture, turgor normal for ethnicity, no rashes or lesions, warm and dry  Neurologic: CNII-XII intact, normal strength    Data this admission:  XR CHEST AP PORTABLE  (CPT=71045)    Result Date: 3/27/2024  CONCLUSION:  There is no acute abnormality detected on this single portable chest radiograph.   LOCATION:  [facility]      Dictated by (CST): Ronni Mercado MD on 3/27/2024 at 2:58 PM     Finalized by (CST): Ronni Mercado MD on 3/27/2024 at 2:59 PM         Labs:  Lab Results   Component Value Date    WBC 10.2 2024    HGB 15.2 2024    HCT 46.8 2024    .0 2024    CREATSERUM 1.83 2024    BUN 22 2024     2024    K 5.6 2024     2024    CO2 23.0 2024     2024    CA 9.6 2024       Assessment/Plan:    Acute respiratory failure 2/2 influenza B  -place on bipap as needed  -wean O2 as able  -cough medication  -nebulizer as needed  -CTA chest negative for PE  -US BLE negative for DVT  -Solumedrol 80mg q8H  -will start weaning  -continue Rocephin and Azithromycin    Malignant pseudomyxoma peritonei  -follows with Dr. Tapia  -under surveillance with follow up in 6 months    Gout  -continue Febuxostat    Depression  -continue home medications    GERD  -continue home medications    F/E/N  -regular diet  -replete electrolytes as needed    Proph  -Lovenox  -SCD    Dispo  -Full code  -may transfer to telemetry      Plan of care discussed with intensivist on-call, Dr. Dinora Rao, [facility]  Critical Care NP  Phone [phone]    Pulmonary Critical Care Physician Attestation    I have personally seen and examined the patient.  I have reviewed and agree with Patricia METZGER's documentation with the following highlights/changes.    Cardiac CTA today done for reduced LVEF showed subtotal occlusion of the LAD. Discussed with Dr. Martin of cardiology, will treat medically and plan for LHC when recovered from current illness. Oxygen requirements are improved today. Continue ceftriaxone, azithromycin, and tamiflu and will taper off steroids. Stable for transfer to floor. [facility] will follow out of ICU.    Axel Farias MD  Pulmonary & Critical Care Medicine  [facility]

## 2024-03-29 NOTE — PHYSICAL THERAPY NOTE
PHYSICAL THERAPY EVALUATION - INPATIENT     Room Number: 462/462-A  Evaluation Date: 3/29/2024  Type of Evaluation: Initial  Physician Order: PT Eval and Treat    Presenting Problem: +influezna  Co-Morbidities : h/o Pseudomyxoma peritonei  Reason for Therapy: Mobility Dysfunction and Discharge Planning    PHYSICAL THERAPY ASSESSMENT   Patient is currently functioning near baseline with bed mobility, transfers, gait, and stair negotiation.  Prior to admission, patient's baseline is independent.  Patient is requiring supervision and contact guard assist as a result of the following impairments: decreased functional strength and decreased endurance/aerobic capacity.  Physical Therapy will continue to follow for duration of hospitalization.    Patient will benefit from continued skilled PT Services at discharge to promote functional independence in home.  Anticipate patient will return home with home health PT.    PLAN  PT Treatment Plan: Bed mobility;Body mechanics;Endurance;Energy conservation;Patient education;Family education;Gait training;Strengthening;Transfer training;Stair training;Balance training  Rehab Potential : Good  Frequency (Obs):  (2-3x/week)  Number of Visits to Meet Established Goals: 3      CURRENT GOALS    Goal #1 Patient is able to negotiate 1 flight of stairs with supervision to ensure safety at NV.     Goal #2 Patient is able to demonstrate transfers Sit to/from Stand at assistance level: supervision     Goal #3 Patient is able to ambulate 50 feet with assist device: walker - rolling at assistance level: supervision     Goal #4    Goal #5    Goal #6    Goal Comments: Goals established on 3/29/2024      PHYSICAL THERAPY MEDICAL/SOCIAL HISTORY  History related to current admission: Patient is a 58 year old male admitted on 3/27/2024 from home for influenza.  Pt underwent gated CTA 3/29/24.      HOME SITUATION  Type of Home: House   Home Layout: One level                Lives With:  (sister  and brother in law)     Patient Owned Equipment: None       Prior Level of [place]: Pt reports ind PTA.  Pt reports has cane, RW, and w/c from self and family members.      SUBJECTIVE  \"I am pretty well set up.\"       OBJECTIVE  Precautions: Bed/chair alarm  Fall Risk: Standard fall risk    WEIGHT BEARING RESTRICTION                   PAIN ASSESSMENT  Ratin          COGNITION  Overall Cognitive Status:  WFL - within functional limits    RANGE OF MOTION AND STRENGTH ASSESSMENT  Upper extremity ROM and strength are within functional limits     Lower extremity ROM is within functional limits     Lower extremity strength is within functional limits       BALANCE  Static Sitting: Good  Dynamic Sitting: Good  Static Standing: Good  Dynamic Standing: Fair +    ADDITIONAL TESTS                                    ACTIVITY TOLERANCE                         O2 WALK  Oxygen Therapy  SPO2% Ambulation on Oxygen: 93  Ambulation oxygen flow (liters per minute): 3    NEUROLOGICAL FINDINGS                        AM-PAC '6-Clicks' INPATIENT SHORT FORM - BASIC MOBILITY  How much difficulty does the patient currently have...  Patient Difficulty: Turning over in bed (including adjusting bedclothes, sheets and blankets)?: None   Patient Difficulty: Sitting down on and standing up from a chair with arms (e.g., wheelchair, bedside commode, etc.): None   Patient Difficulty: Moving from lying on back to sitting on the side of the bed?: None   How much help from another person does the patient currently need...   Help from Another: Moving to and from a bed to a chair (including a wheelchair)?: None   Help from Another: Need to walk in hospital room?: A Little   Help from Another: Climbing 3-5 steps with a railing?: A Little       AM-PAC Score:  Raw Score: 22   Approx Degree of Impairment: 20.91%   Standardized Score (AM-PAC Scale): 53.28   CMS Modifier (G-Code): CJ    FUNCTIONAL ABILITY STATUS  Gait Assessment   Functional  Mobility/Gait Assessment  Gait Assistance: Supervision  Distance (ft): 481rsb1;  10ftx2  Assistive Device: None  Pattern: Shuffle    Skilled Therapy Provided     Bed Mobility:  Rolling: ind  Supine to sit: ind   Sit to supine: NT     Transfer Mobility:  Sit to stand: supervision   Stand to sit: supervision  Gait = supervision with increased time.    Therapist's Comments: Pt without desat while on 3L O2.  Mild SOB and some fatigue reported.  Anticipate 1 additional follow up.    Exercise/Education Provided:  Bed mobility  Body mechanics  Functional activity tolerated  Gait training  Posture  Transfer training    Patient End of Session: Up in chair;Needs met;Call light within reach;RN aware of session/findings;All patient questions and concerns addressed;Alarm set      Patient Evaluation Complexity Level:  History Moderate - 1 or 2 personal factors and/or co-morbidities   Examination of body systems Moderate - addressing a total of 3 or more elements   Clinical Presentation Moderate - Evolving   Clinical Decision Making Moderate - Evolving       PT Session Time: 25 minutes    Gait Training: 15 minutes

## 2024-03-29 NOTE — PLAN OF CARE
Received pt at the change of shift. Pt A&Ox4, follows commands. On 2LNC tolerating well. Cards APRN notified of losartan PO dosage- told to hold night dose and resume in AM. Metropolol dosage given according to heart rate and sbp parameters. Discussed with APRN about morning dosage- okay to give the scheduled 12.5mg and hold off on the other. 18g IV placed with ultrasound for CT angio today. Up to bedside commode and tolerated well. Voiding via urinal. Call light within reach. Questions answered . See flow sheets for further information.

## 2024-03-29 NOTE — PLAN OF CARE
Received pt at shift change on 3LNC, later weaned to 2LNC. Pt alert and oriented and able to move independently with standby assist. VSS,afebrile, no complaints of pain. Pt tolerating diet after returning from CT. Pt safety and comfort maintained. Family at bedside updated on patient status. Pt handed off to Elizabeth JIMENES at 1600. See mar and flowsheets for more information  Problem: RESPIRATORY - ADULT  Goal: Achieves optimal ventilation and oxygenation  Description: INTERVENTIONS:  - Assess for changes in respiratory status  - Assess for changes in mentation and behavior  - Position to facilitate oxygenation and minimize respiratory effort  - Oxygen supplementation based on oxygen saturation or ABGs  - Provide Smoking Cessation handout, if applicable  - Encourage broncho-pulmonary hygiene including cough, deep breathe, Incentive Spirometry  - Assess the need for suctioning and perform as needed  - Assess and instruct to report SOB or any respiratory difficulty  - Respiratory Therapy support as indicated  - Manage/alleviate anxiety  - Monitor for signs/symptoms of CO2 retention  Outcome: Progressing     Problem: SAFETY ADULT - FALL  Goal: Free from fall injury  Description: INTERVENTIONS:  - Assess pt frequently for physical needs  - Identify cognitive and physical deficits and behaviors that affect risk of falls.  - [place] fall precautions as indicated by assessment.  - Educate pt/family on patient safety including physical limitations  - Instruct pt to call for assistance with activity based on assessment  - Modify environment to reduce risk of injury  - Provide assistive devices as appropriate  - Consider OT/PT consult to assist with strengthening/mobility  - Encourage toileting schedule  Outcome: Progressing     Problem: CARDIOVASCULAR - ADULT  Goal: Maintains optimal cardiac output and hemodynamic stability  Description: INTERVENTIONS:  - Monitor vital signs, rhythm, and trends  - Monitor for bleeding,  hypotension and signs of decreased cardiac output  - Evaluate effectiveness of vasoactive medications to optimize hemodynamic stability  - Monitor arterial and/or venous puncture sites for bleeding and/or hematoma  - Assess quality of pulses, skin color and temperature  - Assess for signs of decreased coronary artery perfusion - ex. Angina  - Evaluate fluid balance, assess for edema, trend weights  Outcome: Progressing  Goal: Absence of cardiac arrhythmias or at baseline  Description: INTERVENTIONS:  - Continuous cardiac monitoring, monitor vital signs, obtain 12 lead EKG if indicated  - Evaluate effectiveness of antiarrhythmic and heart rate control medications as ordered  - Initiate emergency measures for life threatening arrhythmias  - Monitor electrolytes and administer replacement therapy as ordered  Outcome: Progressing

## 2024-03-29 NOTE — PROGRESS NOTES
Progress Note  Pancho Arellano Patient Status:  Inpatient    1965 MRN [id number]   Location [facility] 4SW-A Attending Pam Patel, DO   Hosp Day # 2 PCP Jose Mills MD     Subjective:  Pt denies chest pain, SOB. Playing cards at bedside, on 2L O2 NC.    Objective:  /72 (BP Location: Right arm)   Pulse 71   Temp 97.7 °F (36.5 °C) (Temporal)   Resp 18   Wt 246 lb 11.1 oz (111.9 kg)   SpO2 96%   BMI 32.55 kg/m²     Telemetry: NSR    Intake/Output:    Intake/Output Summary (Last 24 hours) at 3/29/2024 1420  Last data filed at 3/29/2024 1200  Gross per 24 hour   Intake 1268.4 ml   Output 550 ml   Net 718.4 ml       Last 3 Weights   24 0350 246 lb 11.1 oz (111.9 kg)   24 2246 235 lb 0.2 oz (106.6 kg)   24 1712 235 lb 0.2 oz (106.6 kg)   24 1228 237 lb (107.5 kg)   23 1052 246 lb (111.6 kg)       Labs:  Recent Labs   Lab 24  1334 24  0450 24  0441 24  0716 24  1151   * 97 164*  --   --    BUN 18 19 22  --   --    CREATSERUM 2.04* 1.48* 1.83*  --   --    EGFRCR 37* 54* 42*  --   --    CA 9.3 8.1* 9.6  --   --     137 135*  --   --    K 4.2 4.7 5.6* 5.3* 5.0    111 107  --   --    CO2 26.0 24.0 23.0  --   --      Recent Labs   Lab 24  1334 24  0450 24  0441   RBC 5.42 4.27* 4.72   HGB 17.5 13.9 15.2   HCT 52.5 41.6 46.8   MCV 96.9 97.4 99.2   MCH 32.3 32.6 32.2   MCHC 33.3 33.4 32.5   RDW 12.2 12.6 12.3   NEPRELIM 10.19* 11.26* 8.52*   WBC 11.8* 14.7* 10.2   .0 147.0* 143.0*         Recent Labs   Lab 24  0441   TROPHS 19       Diagnostics:  CT CARDIAC OVER READ    Result Date: 3/29/2024  CONCLUSION:  Bilateral lower lobe consolidations, partially visualized.  Findings may be related to pneumonia and/or aspiration.  Clinical correlation and follow-up to resolution is recommended.  Cardiac over-read performed.  Please see separately dictated cardiologist's report for further  cardiovascular findings.    LOCATION:  [facility]    Dictated by (CST): Aaron Miner MD on 3/29/2024 at 11:01 AM     Finalized by (CST): Aaron Miner MD on 3/29/2024 at 11:03 AM       ROS    Physical Exam:    Gen: alert, oriented x 3, NAD  Heent: pupils equal, reactive. Mucous membranes moist.   Neck: no jvd  Cardiac: regular rate and rhythm, normal S1,S2, no murmur, clicks, rub or gallop  Lungs: CTA  Abd: soft, NT/ND +bs  Ext: no edema  Skin: Warm, dry  Neuro: No focal deficits      Medications:     metoprolol  5 mg Intravenous See Admin Instructions    Or    dilTIAZem  5 mg Intravenous See Admin Instructions    oseltamivir  30 mg Oral Q12H    budesonide  0.5 mg Nebulization BID    methylPREDNISolone  40 mg Intravenous Q12H    Followed by    [START ON 3/31/2024] methylPREDNISolone  40 mg Intravenous Once    ipratropium-albuterol  3 mL Nebulization 6 times per day    arformoterol  15 mcg Nebulization 2 times daily    cefTRIAXone  2 g Intravenous Q24H    enoxaparin  40 mg Subcutaneous Daily    azithromycin  500 mg Oral Daily    metoprolol tartrate  50 mg Oral Once    Or    metoprolol tartrate  100 mg Oral Once    metoprolol succinate  12.5 mg Oral Daily Beta Blocker    [Held by provider] losartan  25 mg Oral Daily    guaiFENesin ER  600 mg Oral BID    febuxostat  80 mg Oral Daily    oxyCODONE ER  40 mg Oral 2 times per day       Assessment:  Acute Hypoxic Respiratory Failure, Sepsis - Secondary to Influenza B, BLL Pneumonia  Decreasing O2 requirements  Tamiflu, Steroids, inhalers, prn nebs  BC NGTD  IV antibiotics  Pulm following  New Onset HFrEF - Possible Stress CM  LVEF 30-35%, severe hypokinesis of apical walls  CCTA w/subtotal occlusions mid LAD, mid RCA - CACS 53  Denies chest pain, troponin negative  Started on BB, losartan  LORE on CKD  Cr 1.83 today  Hyperkalemia - repeat K 5.0  Hx Pseudomyxoma Peritonei  Follows with Dr Tapia  Hx Gouty Arthritis    Plan:  CCTA with subtotal coronary artery disease. Pt denies  chest pain and troponin negative.   Continue toprol 12.5mg po daily.  Hold losartan today with low BP and renal function, hyperkalemia. Can resume on Sunday if renal function and BP allow.   Antibiotics, steroids, inhalers, supportive care per primary, pulmonology  Patient is stable from cardiology standpoint. We will sign off at this time. Pt should follow up in [facility] office with Dr Martin in 1-2 weeks.     Plan of care discussed with patient, RN.    Katie Griffin, APRN  3/29/2024  2:20 PM  [phone] [facility]  [phone] [facility]        Patient seen and examined independently.  Note reviewed and labs reviewed.  Agree with above assessment and plan.  Coronary CTA with subtototal occlusion of mid LAD.  HS troponin normal this AM.  Upon further questioning, the patient tells me he felt like having a heart attack back in February, mentioned this to his sister, but did not seek medical attention at the time.  Clinical picture consistent with influenza PNA now, with incidentally noted drop in EF.  Will treat medically for now - on BB and ARB.  No additional inpatient cardiac testing needed.  F/u with me in office in 2-3 weeks to discuss timing of LHC with possible PCI, and to uptitrate meds.  Cardiology service will sign off.    ERICK Martin MD

## 2024-03-29 NOTE — IMAGING NOTE
Pancho from ICU to CT Rm 4 via ICU bed, accompanied by ICU RN and transport tech.     Pt denies use of long acting nitrates like, Imdur, Cialis, Levitra and Viagra.   O2 initially nasal cannula @ 2L but requiring 5L while supine.    Positioned pt on table. Procedure explained and questions answered. Vital signs monitored and noted in Flowsheet.    PRN medications given per standing orders, please see MAR.    GFR = 42  Contrast injected followed by saline flush at 0935  Contrast = 85 ml  0.9 NS flush = 75 ml  HR during scan = 59 BPM     Patient tolerated the procedure without complication. Denies any contrast reaction. Patient instructed to hydrate well for next 48 hrs to facilitate contrast excretion.     Pancho transported back to ICU via bed escorted by ICU RN and transport tech.

## 2024-03-29 NOTE — IMAGING NOTE
Spoke to Amy ICU RN r/t Pt add-on for CT gated coronary study.    Instructed to hold all caffeine, verified patient stable for study, 20 or larger IV w/ pressure tubing please.    Plan for 0830 please.    RN verbalized understanding.

## 2024-03-30 LAB
ANION GAP SERPL CALC-SCNC: 6 MMOL/L (ref 0–18)
BASOPHILS # BLD: 0 X10(3) UL (ref 0–0.2)
BASOPHILS NFR BLD: 0 %
BUN BLD-MCNC: 31 MG/DL (ref 9–23)
CALCIUM BLD-MCNC: 9.5 MG/DL (ref 8.5–10.1)
CHLORIDE SERPL-SCNC: 107 MMOL/L (ref 98–112)
CO2 SERPL-SCNC: 26 MMOL/L (ref 21–32)
CREAT BLD-MCNC: 1.56 MG/DL
EGFRCR SERPLBLD CKD-EPI 2021: 51 ML/MIN/1.73M2 (ref 60–?)
EOSINOPHIL # BLD: 0 X10(3) UL (ref 0–0.7)
EOSINOPHIL NFR BLD: 0 %
ERYTHROCYTE [DISTWIDTH] IN BLOOD BY AUTOMATED COUNT: 12.3 %
GLUCOSE BLD-MCNC: 169 MG/DL (ref 70–99)
HCT VFR BLD AUTO: 40.2 %
HGB BLD-MCNC: 13.3 G/DL
LYMPHOCYTES NFR BLD: 1.18 X10(3) UL (ref 1–4)
LYMPHOCYTES NFR BLD: 7 %
MCH RBC QN AUTO: 32 PG (ref 26–34)
MCHC RBC AUTO-ENTMCNC: 33.1 G/DL (ref 31–37)
MCV RBC AUTO: 96.6 FL
MONOCYTES # BLD: 0.84 X10(3) UL (ref 0.1–1)
MONOCYTES NFR BLD: 5 %
MORPHOLOGY: NORMAL
NEUTROPHILS # BLD AUTO: 14.27 X10 (3) UL (ref 1.5–7.7)
NEUTROPHILS NFR BLD: 73 %
NEUTS BAND NFR BLD: 15 %
NEUTS HYPERSEG # BLD: 14.78 X10(3) UL (ref 1.5–7.7)
OSMOLALITY SERPL CALC.SUM OF ELEC: 298 MOSM/KG (ref 275–295)
PLATELET # BLD AUTO: 164 10(3)UL (ref 150–450)
PLATELET MORPHOLOGY: NORMAL
POTASSIUM SERPL-SCNC: 5 MMOL/L (ref 3.5–5.1)
RBC # BLD AUTO: 4.16 X10(6)UL
SODIUM SERPL-SCNC: 139 MMOL/L (ref 136–145)
TOTAL CELLS COUNTED BLD: 100
WBC # BLD AUTO: 16.8 X10(3) UL (ref 4–11)

## 2024-03-30 PROCEDURE — 99232 SBSQ HOSP IP/OBS MODERATE 35: CPT | Performed by: INTERNAL MEDICINE

## 2024-03-30 RX ORDER — PREDNISONE 20 MG/1
40 TABLET ORAL
Status: DISCONTINUED | OUTPATIENT
Start: 2024-03-31 | End: 2024-04-03

## 2024-03-30 NOTE — OCCUPATIONAL THERAPY NOTE
OCCUPATIONAL THERAPY EVALUATION - INPATIENT    Room Number: 2621/2621-A  Evaluation Date: 3/30/2024     Type of Evaluation: Initial  Presenting Problem: influenza B    Physician Order: IP Consult to Occupational Therapy  Reason for Therapy:  ADL/IADL Dysfunction and Discharge Planning  History related to admission:  Patient admitted from home with shortness of breath.      OCCUPATIONAL THERAPY ASSESSMENT   Patient is a 58 year old male admitted on 3/27/2024 with Presenting Problem: influenza B. Co-Morbidities : pseudomyoxma peritonei, gerd, gout, neuropathic pain, h/o opioid abuse  Patient is currently functioning at baseline with toileting, upper body dressing, lower body dressing, grooming, eating, transfers, and energy conservation strategies.  Prior to admission, patient's baseline is independent.  Patient met all OT goals at independent to modified independent level.  Patient reports no further questions/concerns at this time.     Patient will benefit from continued skilled OT Services at discharge to promote functional independence in home.  Anticipate patient will return home with home health OT      WEIGHT BEARING RESTRICTION  Weight Bearing Restriction: None                Recommendations for nursing staff:   Transfers: 1 person  Toileting location: Toilet    EVALUATION SESSION:  Patient at start of session: seated in recliner, agreeable to therapy  FUNCTIONAL TRANSFER ASSESSMENT  Sit to Stand: Chair  Chair: Independent  Toilet Transfer: Independent    BED MOBILITY     BALANCE ASSESSMENT     FUNCTIONAL ADL ASSESSMENT       ACTIVITY TOLERANCE: On 6L O2.  Pt denied shortness of breath at rest or during toileting. Completed functional mobility around unit for simulated household distances on L, with one standing rest break. O2 90-92% after mobility.                          O2 SATURATIONS       COGNITION  Overall Cognitive Status:  WFL - within functional limits  COGNITION ASSESSMENTS       Upper Extremity:    ROM: within functional limits   Strength: is within functional limits   Coordination:  Gross motor: wfl  Fine motor: wfl  Sensation: no deficits noted or reported    EDUCATION PROVIDED  Patient: Role of Occupational Therapy; Plan of Care; Discharge Recommendations; Energy Conservation    Equipment used: none   Demonstrates functional use    Therapist comments:   Patient seated in recliner and was easily able to doff/don sock using figure four position. Completed in-room mobility and toileting and  hand hygiene with independence. Patient able to complete functional mobility in unit for simulated household distances with SUP to IND and no device. Stopped once for brief standing rest break. Wore 6L O2 for entire session. OT educated patient on pacing and energy conservation principles. Patient left seated in chair with needs met and call light in reach.    Patient End of Session: All patient questions and concerns addressed;RN aware of session/findings;Call light within reach;Needs met;Up in chair    OCCUPATIONAL PROFILE    HOME SITUATION  Type of Home: House  Home Layout: Multi-level  Lives With: Family (sister and brother in law)    Toilet and Equipment: Standard height toilet  Shower/Tub and Equipment: Walk-in shower;Grab bar (accessible shower)       Occupation/Status: retired        Patient Regularly Uses: Glasses    Prior Level of Function: Independent, drives, lives with supportive family    SUBJECTIVE  \"I've had cancer for twelve years. Most people with this type of cancer last about 2 years. I was tiffanie to get a kidney stone, that's how they found mine early.\"    PAIN ASSESSMENT  Ratin          OBJECTIVE  Precautions: Bed/chair alarm  Fall Risk: Standard fall risk    WEIGHT BEARING RESTRICTION  Weight Bearing Restriction: None                AM-PAC ‘6-Clicks’ Inpatient Daily Activity Short Form  -   Putting on and taking off regular lower body clothing?: None  -   Bathing (including washing, rinsing,  drying)?: None  -   Toileting, which includes using toilet, bedpan or urinal? : None  -   Putting on and taking off regular upper body clothing?: None  -   Taking care of personal grooming such as brushing teeth?: None  -   Eating meals?: None    AM-PAC Score:  Score: 24  Approx Degree of Impairment: 0%  Standardized Score (AM-PAC Scale): 57.54      ADDITIONAL TESTS     NEUROLOGICAL FINDINGS        PLAN   Patient has been evaluated and presents with no skilled Occupational Therapy needs at this time.  Patient discharged from Occupational Therapy services.  Please re-order if a new functional limitation presents during this admission.      Patient Evaluation Complexity Level:   Occupational Profile/Medical History LOW - Brief history including review of medical or therapy records    Specific performance deficits impacting engagement in ADL/IADL LOW  1 - 3 performance deficits    Client Assessment/Performance Deficits MODERATE - Comorbidities and min to mod modifications of tasks    Clinical Decision Making LOW - Analysis of occupational profile, problem-focused assessments, limited treatment options    Overall Complexity LOW     OT Session Time: 20 minutes  Self-Care Home Management: 5 minutes  Therapeutic Activity: 10 minutes

## 2024-03-30 NOTE — PROGRESS NOTES
[facility]   part of [facility]     Hospitalist Progress Note     Pancholaura Arellano Patient Status:  Inpatient    1965 MRN [id number]   Location [facility] 4SW-A Attending Destin Walker, DO   Hosp Day # 3 PCP Jose Mills MD     Chief Complaint: cough, SOB    Subjective:     Feeling better this morning, using the flutter valve throughout the day and would like to ambulate more. Thinks cough is improving    Objective:    Review of Systems:   A comprehensive review of systems was completed; pertinent positive and negatives stated in subjective.    Vital signs:  Temp:  [97.1 °F (36.2 °C)-98.6 °F (37 °C)] 98.6 °F (37 °C)  Pulse:  [59-84] 79  Resp:  [13-26] 26  BP: ()/(49-84) 158/65  SpO2:  [88 %-97 %] 88 %    Physical Exam:    General: No acute distress  Respiratory: No wheezes, no rhonchi, coarse throughout  Cardiovascular: S1, S2, regular rate and rhythm  Abdomen: Soft, Non-tender, non-distended, positive bowel sounds  Neuro: No new focal deficits.   Extremities: No edema      Diagnostic Data:    Labs:  Recent Labs   Lab 24  1334 24  0450 24  0441 24  0450   WBC 11.8* 14.7* 10.2 16.8*   HGB 17.5 13.9 15.2 13.3   MCV 96.9 97.4 99.2 96.6   .0 147.0* 143.0* 164.0   BAND  --   --   --  15       Recent Labs   Lab 24  1334 24  0450 24  0441 24  0716 24  1151 24  0450   * 97 164*  --   --  169*   BUN 18 19 22  --   --  31*   CREATSERUM 2.04* 1.48* 1.83*  --   --  1.56*   CA 9.3 8.1* 9.6  --   --  9.5   ALB 4.2  --   --   --   --   --     137 135*  --   --  139   K 4.2 4.7 5.6* 5.3* 5.0 5.0    111 107  --   --  107   CO2 26.0 24.0 23.0  --   --  26.0   ALKPHO 96  --   --   --   --   --    AST 29  --   --   --   --   --    ALT 34  --   --   --   --   --    BILT 0.4  --   --   --   --   --    TP 8.5*  --   --   --   --   --        Estimated Creatinine Clearance: 57.6 mL/min (A) (based on SCr of 1.56 mg/dL  (H)).    Recent Labs   Lab 03/29/24  0441   TROPHS 19       No results for input(s): \"PTP\", \"INR\" in the last 168 hours.               Microbiology    Hospital Encounter on 03/27/24   1. Blood Culture     Status: None (Preliminary result)    Collection Time: 03/27/24  1:33 PM    Specimen: Blood,peripheral   Result Value Ref Range    Blood Culture Result No Growth 2 Days N/A         Imaging: Reviewed in Epic.    Medications:    metoprolol  5 mg Intravenous See Admin Instructions    Or    dilTIAZem  5 mg Intravenous See Admin Instructions    oseltamivir  30 mg Oral Q12H    budesonide  0.5 mg Nebulization BID    methylPREDNISolone  40 mg Intravenous Q12H    Followed by    [START ON 3/31/2024] methylPREDNISolone  40 mg Intravenous Once    ipratropium-albuterol  3 mL Nebulization 6 times per day    arformoterol  15 mcg Nebulization 2 times daily    cefTRIAXone  2 g Intravenous Q24H    enoxaparin  40 mg Subcutaneous Daily    azithromycin  500 mg Oral Daily    metoprolol tartrate  50 mg Oral Once    Or    metoprolol tartrate  100 mg Oral Once    metoprolol succinate  12.5 mg Oral Daily Beta Blocker    [Held by provider] losartan  25 mg Oral Daily    guaiFENesin ER  600 mg Oral BID    febuxostat  80 mg Oral Daily    oxyCODONE ER  40 mg Oral 2 times per day       Assessment & Plan:      #Acute hypoxic respiratory failure due to influenza B infection, BLL PNA, new onset HFrEF  -Wean oxygen as tolerated  -PRN nebs  -IV steroids, inhalers  -Pulm following     #Influenza B infection  -Tamiflu  -Cough medications    #Leukocytosis, suspect d/t steroids  - follow CBC    #New onset HFrEF; may be d/t viral infection  - echo with EF 35%  - CTA coronary arteries reviewed  - metoprolol, losartan started   - cardiology consulted > recommend outpt f/u after discharge     #Sepsis POA likely due to influenza infection, superimposed bacterial infection  -S/p sepsis bolus on admission  -IVF  -Follow blood cultures  -empiric  ceftriaxone/azithromycin     #LORE on CKD stage III  -improving     #Pseudomyxoma peritonei   -Sees Dr. Tapia and is doing well clinically and radiographically.  Most recent CT scans were stable findings and tumor markers were unremarkable.  He follows up every 6 months  -home pain meds    #Gouty arthritis  -Follows with Dr. Zohreh Amin  -Uloric continued      Pam Patel,     Supplementary Documentation:     Quality:  DVT Mechanical Prophylaxis:   SCDs,    DVT Pharmacologic Prophylaxis   Medication    enoxaparin (Lovenox) 40 MG/0.4ML SUBQ injection 40 mg                Code Status: Full Code  Knott: No urinary catheter in place  Knott Duration (in days):   Central line:    IRVING:     Discharge is dependent on: clinical state  At this point Mr. Arellano is expected to be discharge to: TBD    The 21st Century Cures Act makes medical notes like these available to patients in the interest of transparency. Please be advised this is a medical document. Medical documents are intended to carry relevant information, facts as evident, and the clinical opinion of the practitioner. The medical note is intended as peer to peer communication and may appear blunt or direct. It is written in medical language and may contain abbreviations or verbiage that are unfamiliar.             **Certification      PHYSICIAN Certification of Need for Inpatient Hospitalization - Initial Certification    Patient will require inpatient services that will reasonably be expected to span two midnight's based on the clinical documentation in H+P.   Based on patients current state of illness, I anticipate that, after discharge, patient will require TBD.

## 2024-03-30 NOTE — PLAN OF CARE
Assumed care at 0730. Pt is A&Ox4. Pt is on 8L HF, expiratory wheezing and crackles. NSR on tele, VSS. No complaints of cardiac symptoms. Continent of B&B. Denies pain at this time. Standby assist, tolerating well. Plan of care reviewed with patient, verbalizes understanding, all needs addressed at this time. Bed in lowest position and locked. Call light at bedside.    POC:  Nebulizer treatment  IV Solumedrol  PO/IV abx  Outpt cardiac w/u      Problem: CARDIOVASCULAR - ADULT  Goal: Maintains optimal cardiac output and hemodynamic stability  Description: INTERVENTIONS:  - Monitor vital signs, rhythm, and trends  - Monitor for bleeding, hypotension and signs of decreased cardiac output  - Evaluate effectiveness of vasoactive medications to optimize hemodynamic stability  - Monitor arterial and/or venous puncture sites for bleeding and/or hematoma  - Assess quality of pulses, skin color and temperature  - Assess for signs of decreased coronary artery perfusion - ex. Angina  - Evaluate fluid balance, assess for edema, trend weights  Outcome: Progressing  Goal: Absence of cardiac arrhythmias or at baseline  Description: INTERVENTIONS:  - Continuous cardiac monitoring, monitor vital signs, obtain 12 lead EKG if indicated  - Evaluate effectiveness of antiarrhythmic and heart rate control medications as ordered  - Initiate emergency measures for life threatening arrhythmias  - Monitor electrolytes and administer replacement therapy as ordered  Outcome: Progressing     Problem: RESPIRATORY - ADULT  Goal: Achieves optimal ventilation and oxygenation  Description: INTERVENTIONS:  - Assess for changes in respiratory status  - Assess for changes in mentation and behavior  - Position to facilitate oxygenation and minimize respiratory effort  - Oxygen supplementation based on oxygen saturation or ABGs  - Provide Smoking Cessation handout, if applicable  - Encourage broncho-pulmonary hygiene including cough, deep breathe, Incentive  Spirometry  - Assess the need for suctioning and perform as needed  - Assess and instruct to report SOB or any respiratory difficulty  - Respiratory Therapy support as indicated  - Manage/alleviate anxiety  - Monitor for signs/symptoms of CO2 retention  Outcome: Progressing

## 2024-03-30 NOTE — RESPIRATORY THERAPY NOTE
Received patient on 2 L nasal cannula.     Patient given DuoNeb, Pulmicort, and Brovana as ordered. Breath sounds are coarse with expiratory wheeze before and after treatments. Patient has strong, productive cough with thick, white secretions. Patient tolerated treatments well.     Ann Marie Yarbrough, Martha, RRT

## 2024-03-30 NOTE — PLAN OF CARE
Assumed care of pt following shift report. Pt is A&Ox4 and denies pain. Afebrile and normotensive. Tele monitor reads NSR. Pt on 2L O2 HF nasal cannula. Pt did experience some shortness of breath with ambulating to the bathroom and brushing his teeth but was able to recover with sitting. Has a productive cough; receiving nebs. No BM for shift. Bed became available on the floor. Pt will be transferring to room 2621. Called report to Henry JIMENES at 2200. Pt transported off unit with all of his belongings at 2255.

## 2024-03-30 NOTE — PROGRESS NOTES
[facility]    Pancho Arellano Patient Status:  Inpatient    1965 MRN [id number]   Location [facility] 2NE-A Attending Pam Patel, DO   Hosp Day # 3 PCP Jose Mills MD     SUBJECTIVE:overall doing better.      OBJECTIVE:  /65 (BP Location: Right arm)   Pulse 79   Temp 98.6 °F (37 °C) (Oral)   Resp 26   Wt 246 lb 11.1 oz (111.9 kg)   SpO2 (!) 88%   BMI 32.55 kg/m²   O2 requirement: on 7L HFNC     I/O last 3 completed shifts:  In: 759.9 [P.O.:440; I.V.:219.9; IV PIGGYBACK:100]  Out: 250 [Urine:250]  No intake/output data recorded.     Current Medications:   Current Facility-Administered Medications:     metoprolol (Lopressor) 5 mg/5mL injection 5 mg, 5 mg, Intravenous, See Admin Instructions **OR** dilTIAZem (cardIZEM) 25 mg/5mL injection 5 mg, 5 mg, Intravenous, See Admin Instructions    oseltamivir (Tamiflu) cap 30 mg, 30 mg, Oral, Q12H    budesonide (Pulmicort) 0.5 MG/2ML nebulizer suspension 0.5 mg, 0.5 mg, Nebulization, BID    methylPREDNISolone sodium succinate (Solu-MEDROL) injection 40 mg, 40 mg, Intravenous, Q12H **FOLLOWED BY** [START ON 3/31/2024] methylPREDNISolone sodium succinate (Solu-MEDROL) injection 40 mg, 40 mg, Intravenous, Once    ipratropium-albuterol (Duoneb) 0.5-2.5 (3) MG/3ML inhalation solution 3 mL, 3 mL, Nebulization, 6 times per day    arformoterol (Brovana) 15 MCG/2ML nebulizer solution 15 mcg, 15 mcg, Nebulization, 2 times daily    cefTRIAXone (Rocephin) 2 g in D5W 100 mL IVPB-ADD, 2 g, Intravenous, Q24H    enoxaparin (Lovenox) 40 MG/0.4ML SUBQ injection 40 mg, 40 mg, Subcutaneous, Daily    azithromycin (Zithromax) tab 500 mg, 500 mg, Oral, Daily    metoprolol tartrate (Lopressor) tab 50 mg, 50 mg, Oral, Once PRN **OR** metoprolol tartrate (Lopressor) tab 100 mg, 100 mg, Oral, Once PRN    metoprolol tartrate (Lopressor) tab 50 mg, 50 mg, Oral, Once **OR** metoprolol tartrate (Lopressor) tab 100 mg, 100 mg, Oral, Once    metoprolol succinate  (Toprol XL) partial tablet 12.5 mg, 12.5 mg, Oral, Daily Beta Blocker    [Held by provider] losartan (Cozaar) tab 25 mg, 25 mg, Oral, Daily    guaiFENesin ER (Mucinex) 12 hr tab 600 mg, 600 mg, Oral, BID    benzonatate (Tessalon) cap 200 mg, 200 mg, Oral, TID PRN    guaiFENesin (Robitussin) 100 MG/5 ML oral liquid 100 mg, 100 mg, Oral, Q4H PRN    acetaminophen (Tylenol Extra Strength) tab 500 mg, 500 mg, Oral, Q4H PRN    ondansetron (Zofran) 4 MG/2ML injection 4 mg, 4 mg, Intravenous, Q6H PRN    melatonin tab 3 mg, 3 mg, Oral, Nightly PRN    docusate sodium (Colace) cap 100 mg, 100 mg, Oral, BID PRN    polyethylene glycol (PEG 3350) (Miralax) 17 g oral packet 17 g, 17 g, Oral, Daily PRN    bisacodyl (Dulcolax) 10 MG rectal suppository 10 mg, 10 mg, Rectal, Daily PRN    glycerin-hypromellose- (Artifical Tears) 0.2-0.2-1 % ophthalmic solution 1 drop, 1 drop, Both Eyes, QID PRN    sodium chloride (Saline Mist) 0.65 % nasal solution 1 spray, 1 spray, Each Nare, Q3H PRN    febuxostat (Uloric) tab 80 mg, 80 mg, Oral, Daily    oxyCODONE ER (OxyCONTIN ER) 12 hr tab 40 mg, 40 mg, Oral, 2 times per day     General appearance: alert, appears stated age, and cooperative  Lungs: diminished breath sounds bibasilar  Heart: regular rate and rhythm  Abdomen: soft, non-tender; bowel sounds normal; no masses,  no organomegaly  Extremities: extremities normal, atraumatic, no cyanosis or edema     Lab Results   Component Value Date    WBC 16.8 03/30/2024    RBC 4.16 03/30/2024    HGB 13.3 03/30/2024    HCT 40.2 03/30/2024    MCV 96.6 03/30/2024    MCH 32.0 03/30/2024    MCHC 33.1 03/30/2024    RDW 12.3 03/30/2024    .0 03/30/2024     Lab Results   Component Value Date     03/30/2024    K 5.0 03/30/2024     03/30/2024    CO2 26.0 03/30/2024    BUN 31 03/30/2024    CREATSERUM 1.56 03/30/2024     03/30/2024    CA 9.5 03/30/2024     Lab Results   Component Value Date    PT 13.5 10/29/2012    PT 13.4  10/24/2012    INR <0.90 (L) 09/15/2014    INR 1.06 10/29/2012    INR 1.05 10/24/2012          Imaging: CTA chest personally reviewed. Bibasilar consolidations, L>R     Assessment/Plan:     Acute respiratory failure - 2/2 influenza B and superimposed LLL bacterial PNA with significant areas of atelectasis  -weaned off NIPPV- currently on nasal canula but still high FiO2  -wean O2 as able  -bronchial hygiene, encourage flutter valve and ambulation  -BD nebs  -CTA chest negative for PE  -US BLE negative for DVT  -transition to prednisone tomorrow  - transition off budesonide/formoterol nebs to trelegy  ID-  influenza- tamiflu x 5d  - bacterial PNA- sputum culture ordered  - cont with ceft/azithro  - check urine legionella/strep ag  Proph- LMWH  Dispo- full code  - will follow      Willard Piper MD  3/30/2024  9:03 AM

## 2024-03-30 NOTE — PROGRESS NOTES
Received pt. From ICU ; alert and o x 4 , on O2 at 2 li/min via nc ; pt. Said \" feels better already \" denies any SOB. Ambulate at the hallway with steady gait , denies any weakness. Tele shows NSR on the monitor. Cont. Monitor per tele/labs/v/s. Meds as ordered. Fall/safety precautions instructed , placed call light w/in reach.     0200     Pt. Desat to 85 - 89 % for 5 mins , increase O2 to 5 li. Pt. Not in any form of distress. Pt. Coughing more with productive cough .    0300  O2 sat still 88-90 %. Change O2 delivery to high flow at 7 li ; O2 sat 92 %

## 2024-03-31 LAB
ATRIAL RATE: 94 BPM
L PNEUMO AG UR QL: NEGATIVE
P AXIS: 49 DEGREES
P-R INTERVAL: 138 MS
Q-T INTERVAL: 354 MS
QRS DURATION: 76 MS
QTC CALCULATION (BEZET): 442 MS
R AXIS: -62 DEGREES
STREP PNEUMO ANTIGEN, URINE: NEGATIVE
T AXIS: 110 DEGREES
VENTRICULAR RATE: 94 BPM

## 2024-03-31 PROCEDURE — 99232 SBSQ HOSP IP/OBS MODERATE 35: CPT | Performed by: INTERNAL MEDICINE

## 2024-03-31 NOTE — PLAN OF CARE
Assumed patient care around 0730 this AM. Patient A&O x4. Received pt on 6L O2 via HFNC, weaned to 4L, will continue to wean as tolerated. Re-educated on IS, best effort 1750, continued use encouraged. Flutter valve also in use. Receiving Nebs, mucinex, Tamiflu, PO Prednisone & IV Rocephin. NSR on tele. Lovenox subcutaneous for dvt prophylaxis. Continent of B&B, last BM 3/29 per charting. Reports generalized chronic pain, declined need for PRN medication at this time. Up independent in room, SBA in woods. Updated on POC, needs met.     Problem: RESPIRATORY - ADULT  Goal: Achieves optimal ventilation and oxygenation  Description: INTERVENTIONS:  - Assess for changes in respiratory status  - Assess for changes in mentation and behavior  - Position to facilitate oxygenation and minimize respiratory effort  - Oxygen supplementation based on oxygen saturation or ABGs  - Provide Smoking Cessation handout, if applicable  - Encourage broncho-pulmonary hygiene including cough, deep breathe, Incentive Spirometry  - Assess the need for suctioning and perform as needed  - Assess and instruct to report SOB or any respiratory difficulty  - Respiratory Therapy support as indicated  - Manage/alleviate anxiety  - Monitor for signs/symptoms of CO2 retention  Outcome: Progressing     Problem: CARDIOVASCULAR - ADULT  Goal: Maintains optimal cardiac output and hemodynamic stability  Description: INTERVENTIONS:  - Monitor vital signs, rhythm, and trends  - Monitor for bleeding, hypotension and signs of decreased cardiac output  - Evaluate effectiveness of vasoactive medications to optimize hemodynamic stability  - Monitor arterial and/or venous puncture sites for bleeding and/or hematoma  - Assess quality of pulses, skin color and temperature  - Assess for signs of decreased coronary artery perfusion - ex. Angina  - Evaluate fluid balance, assess for edema, trend weights  Outcome: Progressing  Goal: Absence of cardiac arrhythmias or at  baseline  Description: INTERVENTIONS:  - Continuous cardiac monitoring, monitor vital signs, obtain 12 lead EKG if indicated  - Evaluate effectiveness of antiarrhythmic and heart rate control medications as ordered  - Initiate emergency measures for life threatening arrhythmias  - Monitor electrolytes and administer replacement therapy as ordered  Outcome: Progressing     Problem: SAFETY ADULT - FALL  Goal: Free from fall injury  Description: INTERVENTIONS:  - Assess pt frequently for physical needs  - Identify cognitive and physical deficits and behaviors that affect risk of falls.  - [place] fall precautions as indicated by assessment.  - Educate pt/family on patient safety including physical limitations  - Instruct pt to call for assistance with activity based on assessment  - Modify environment to reduce risk of injury  - Provide assistive devices as appropriate  - Consider OT/PT consult to assist with strengthening/mobility  - Encourage toileting schedule  Outcome: Progressing

## 2024-03-31 NOTE — PROGRESS NOTES
03/31/24 1345   Mobility   O2 walk? Yes   SPO2% on Oxygen at Rest 95   At rest oxygen flow (liters per minute) 4   SPO2% Ambulation on Oxygen 91   Ambulation oxygen flow (liters per minute) 6

## 2024-03-31 NOTE — PROGRESS NOTES
[facility]    Pancho Arellano Patient Status:  Inpatient    1965 MRN [id number]   Location [facility] 2NE-A Attending Pam Patel, DO   Hosp Day # 4 PCP Jose Mills MD     SUBJECTIVE: overall feels better. Struggling to expectorate secretionsl.     OBJECTIVE:  /81 (BP Location: Right arm)   Pulse 75   Temp 97.4 °F (36.3 °C) (Oral)   Resp 22   Wt 246 lb 11.1 oz (111.9 kg)   SpO2 93%   BMI 32.55 kg/m²   O2 requirement: 6L HFNC     I/O last 3 completed shifts:  In: -   Out: 550 [Urine:550]  No intake/output data recorded.     Current Medications:   Current Facility-Administered Medications:     fluticasone-umeclidin-vilant (Trelegy Ellipta) 200-62.5-25 MCG/ACT inhaler 1 puff, 1 puff, Inhalation, Daily    predniSONE (Deltasone) tab 40 mg, 40 mg, Oral, Daily with breakfast    metoprolol (Lopressor) 5 mg/5mL injection 5 mg, 5 mg, Intravenous, See Admin Instructions **OR** dilTIAZem (cardIZEM) 25 mg/5mL injection 5 mg, 5 mg, Intravenous, See Admin Instructions    oseltamivir (Tamiflu) cap 30 mg, 30 mg, Oral, Q12H    ipratropium-albuterol (Duoneb) 0.5-2.5 (3) MG/3ML inhalation solution 3 mL, 3 mL, Nebulization, 6 times per day    cefTRIAXone (Rocephin) 2 g in D5W 100 mL IVPB-ADD, 2 g, Intravenous, Q24H    enoxaparin (Lovenox) 40 MG/0.4ML SUBQ injection 40 mg, 40 mg, Subcutaneous, Daily    metoprolol tartrate (Lopressor) tab 50 mg, 50 mg, Oral, Once PRN **OR** metoprolol tartrate (Lopressor) tab 100 mg, 100 mg, Oral, Once PRN    metoprolol tartrate (Lopressor) tab 50 mg, 50 mg, Oral, Once **OR** metoprolol tartrate (Lopressor) tab 100 mg, 100 mg, Oral, Once    metoprolol succinate (Toprol XL) partial tablet 12.5 mg, 12.5 mg, Oral, Daily Beta Blocker    [Held by provider] losartan (Cozaar) tab 25 mg, 25 mg, Oral, Daily    guaiFENesin ER (Mucinex) 12 hr tab 600 mg, 600 mg, Oral, BID    benzonatate (Tessalon) cap 200 mg, 200 mg, Oral, TID PRN    guaiFENesin (Robitussin) 100 MG/5 ML  oral liquid 100 mg, 100 mg, Oral, Q4H PRN    acetaminophen (Tylenol Extra Strength) tab 500 mg, 500 mg, Oral, Q4H PRN    ondansetron (Zofran) 4 MG/2ML injection 4 mg, 4 mg, Intravenous, Q6H PRN    melatonin tab 3 mg, 3 mg, Oral, Nightly PRN    docusate sodium (Colace) cap 100 mg, 100 mg, Oral, BID PRN    polyethylene glycol (PEG 3350) (Miralax) 17 g oral packet 17 g, 17 g, Oral, Daily PRN    bisacodyl (Dulcolax) 10 MG rectal suppository 10 mg, 10 mg, Rectal, Daily PRN    glycerin-hypromellose- (Artifical Tears) 0.2-0.2-1 % ophthalmic solution 1 drop, 1 drop, Both Eyes, QID PRN    sodium chloride (Saline Mist) 0.65 % nasal solution 1 spray, 1 spray, Each Nare, Q3H PRN    febuxostat (Uloric) tab 80 mg, 80 mg, Oral, Daily    oxyCODONE ER (OxyCONTIN ER) 12 hr tab 40 mg, 40 mg, Oral, 2 times per day     General appearance: alert, appears stated age, and cooperative  Lungs:  slight bibasilar crackles, wheezes  Heart: S1, S2 normal, no murmur, click, rub or gallop, regular rate and rhythm  Abdomen: soft, non-tender; bowel sounds normal; no masses,  no organomegaly  Extremities: extremities normal, atraumatic, no cyanosis or edema           Lab Results   Component Value Date    PT 13.5 10/29/2012    PT 13.4 10/24/2012    INR <0.90 (L) 09/15/2014    INR 1.06 10/29/2012    INR 1.05 10/24/2012          Imaging: reviewed. Per EMR     Assessment/Plan:     Acute respiratory failure - 2/2 influenza B and superimposed LLL bacterial PNA with significant areas of atelectasis  -weaned off NIPPV- currently on HFNC and slowly coming down  -wean O2 as able  -bronchial hygiene, encourage flutter valve and ambulation; worked with IS at bedside and seemed to help immediately with sats.  -BD nebs  -CTA chest negative for PE  -US BLE negative for DVT  -prednisone slow taper  - transition off budesonide/formoterol nebs to trelegy  ID-  influenza- tamiflu x 5d  - bacterial PNA- sputum culture pending  - cont with ceft/azithro  - urine  legionella/strep ag pending  Proph- LMWH  Dispo- full code  - will follow    Willard Piper MD  3/31/2024  9:01 AM

## 2024-03-31 NOTE — PLAN OF CARE
Assumed care of patient at 1930. Patient is alert and orientated x4. Currently 6L O2 high flow nasal cannula. Lung sounds diminished/crackles. Continuous pulse ox maintained. NSR on tele. Continent of bowel and bladder. PRN tylenlol given for pain. Up standby assist. Call light within reach. Fall precautions in place.    Plan of care:   Nebulizer treatments    Problem: RESPIRATORY - ADULT  Goal: Achieves optimal ventilation and oxygenation  Description: INTERVENTIONS:  - Assess for changes in respiratory status  - Assess for changes in mentation and behavior  - Position to facilitate oxygenation and minimize respiratory effort  - Oxygen supplementation based on oxygen saturation or ABGs  - Provide Smoking Cessation handout, if applicable  - Encourage broncho-pulmonary hygiene including cough, deep breathe, Incentive Spirometry  - Assess the need for suctioning and perform as needed  - Assess and instruct to report SOB or any respiratory difficulty  - Respiratory Therapy support as indicated  - Manage/alleviate anxiety  - Monitor for signs/symptoms of CO2 retention  Outcome: Progressing     Problem: SAFETY ADULT - FALL  Goal: Free from fall injury  Description: INTERVENTIONS:  - Assess pt frequently for physical needs  - Identify cognitive and physical deficits and behaviors that affect risk of falls.  - [place] fall precautions as indicated by assessment.  - Educate pt/family on patient safety including physical limitations  - Instruct pt to call for assistance with activity based on assessment  - Modify environment to reduce risk of injury  - Provide assistive devices as appropriate  - Consider OT/PT consult to assist with strengthening/mobility  - Encourage toileting schedule  Outcome: Progressing

## 2024-03-31 NOTE — PROGRESS NOTES
[facility]   part of [facility]     Hospitalist Progress Note     Pancholaura Arellano Patient Status:  Inpatient    1965 MRN [id number]   Location [facility] 4SW-A Attending Destin Walker, DO   Hosp Day # 4 PCP Jose Mills MD     Chief Complaint: cough, SOB    Subjective:     Ambulated several times yesterday and continues to feel better daily. Feels cough is less productive today. Encouraged continued IS/flutter valve and mobility     Objective:    Review of Systems:   A comprehensive review of systems was completed; pertinent positive and negatives stated in subjective.    Vital signs:  Temp:  [97.5 °F (36.4 °C)-98.6 °F (37 °C)] 97.9 °F (36.6 °C)  Pulse:  [74-82] 81  Resp:  [13-21] 16  BP: (103-138)/(70-94) 129/81  SpO2:  [91 %-95 %] 94 %    Physical Exam:    General: No acute distress  Respiratory: No wheezes, no rhonchi, coarse throughout  Cardiovascular: S1, S2, regular rate and rhythm  Abdomen: Soft, Non-tender, non-distended, positive bowel sounds  Neuro: No new focal deficits.   Extremities: No edema      Diagnostic Data:    Labs:  Recent Labs   Lab 24  1334 24  0450 24  0441 24  0450   WBC 11.8* 14.7* 10.2 16.8*   HGB 17.5 13.9 15.2 13.3   MCV 96.9 97.4 99.2 96.6   .0 147.0* 143.0* 164.0   BAND  --   --   --  15       Recent Labs   Lab 24  1334 24  0450 24  0441 24  0716 24  1151 24  0450   * 97 164*  --   --  169*   BUN 18 19 22  --   --  31*   CREATSERUM 2.04* 1.48* 1.83*  --   --  1.56*   CA 9.3 8.1* 9.6  --   --  9.5   ALB 4.2  --   --   --   --   --     137 135*  --   --  139   K 4.2 4.7 5.6* 5.3* 5.0 5.0    111 107  --   --  107   CO2 26.0 24.0 23.0  --   --  26.0   ALKPHO 96  --   --   --   --   --    AST 29  --   --   --   --   --    ALT 34  --   --   --   --   --    BILT 0.4  --   --   --   --   --    TP 8.5*  --   --   --   --   --        Estimated Creatinine Clearance: 57.6 mL/min (A)  (based on SCr of 1.56 mg/dL (H)).    Recent Labs   Lab 03/29/24  0441   TROPHS 19       No results for input(s): \"PTP\", \"INR\" in the last 168 hours.               Microbiology    Hospital Encounter on 03/27/24   1. Blood Culture     Status: None (Preliminary result)    Collection Time: 03/27/24  1:33 PM    Specimen: Blood,peripheral   Result Value Ref Range    Blood Culture Result No Growth 3 Days N/A         Imaging: Reviewed in Epic.    Medications:    fluticasone-umeclidin-vilant  1 puff Inhalation Daily    predniSONE  40 mg Oral Daily with breakfast    metoprolol  5 mg Intravenous See Admin Instructions    Or    dilTIAZem  5 mg Intravenous See Admin Instructions    oseltamivir  30 mg Oral Q12H    ipratropium-albuterol  3 mL Nebulization 6 times per day    cefTRIAXone  2 g Intravenous Q24H    enoxaparin  40 mg Subcutaneous Daily    metoprolol tartrate  50 mg Oral Once    Or    metoprolol tartrate  100 mg Oral Once    metoprolol succinate  12.5 mg Oral Daily Beta Blocker    [Held by provider] losartan  25 mg Oral Daily    guaiFENesin ER  600 mg Oral BID    febuxostat  80 mg Oral Daily    oxyCODONE ER  40 mg Oral 2 times per day       Assessment & Plan:      #Acute hypoxic respiratory failure due to influenza B infection, BLL PNA, new onset HFrEF  -Wean oxygen as tolerated  -PRN nebs  -IV steroids, inhalers  -Pulm following     #Influenza B infection  -Tamiflu  -Cough medications    #Leukocytosis, suspect d/t steroids  - follow CBC    #New onset HFrEF; may be d/t viral infection  - echo with EF 35%  - CTA coronary arteries reviewed  - metoprolol, losartan started   - cardiology consulted > recommend outpt f/u after discharge     #Sepsis POA likely due to influenza infection, superimposed bacterial infection  -S/p sepsis bolus on admission  -IVF  -Follow blood cultures  -empiric ceftriaxone/azithromycin     #LORE on CKD stage III  -improving     #Pseudomyxoma peritonei   -Sees Dr. Tapia and is doing well clinically  and radiographically.  Most recent CT scans were stable findings and tumor markers were unremarkable.  He follows up every 6 months  -home pain meds    #Gouty arthritis  -Follows with Dr. Zohreh Amin  -Chapin continued      Pam Patel DO    Supplementary Documentation:     Quality:  DVT Mechanical Prophylaxis:   SCDs,    DVT Pharmacologic Prophylaxis   Medication    enoxaparin (Lovenox) 40 MG/0.4ML SUBQ injection 40 mg                Code Status: Full Code  Knott: No urinary catheter in place  Knott Duration (in days):   Central line:    IRVING:     Discharge is dependent on: clinical state  At this point Mr. Arellano is expected to be discharge to: TBD    The 21st Century Cures Act makes medical notes like these available to patients in the interest of transparency. Please be advised this is a medical document. Medical documents are intended to carry relevant information, facts as evident, and the clinical opinion of the practitioner. The medical note is intended as peer to peer communication and may appear blunt or direct. It is written in medical language and may contain abbreviations or verbiage that are unfamiliar.             **Certification      PHYSICIAN Certification of Need for Inpatient Hospitalization - Initial Certification    Patient will require inpatient services that will reasonably be expected to span two midnight's based on the clinical documentation in H+P.   Based on patients current state of illness, I anticipate that, after discharge, patient will require TBD.

## 2024-04-01 PROCEDURE — 99232 SBSQ HOSP IP/OBS MODERATE 35: CPT | Performed by: INTERNAL MEDICINE

## 2024-04-01 RX ORDER — IPRATROPIUM BROMIDE AND ALBUTEROL SULFATE 2.5; .5 MG/3ML; MG/3ML
3 SOLUTION RESPIRATORY (INHALATION) EVERY 6 HOURS
Status: DISCONTINUED | OUTPATIENT
Start: 2024-04-01 | End: 2024-04-03

## 2024-04-01 NOTE — PROGRESS NOTES
[facility]   part of [facility]     Hospitalist Progress Note     Pancholaura Arellnao Patient Status:  Inpatient    1965 MRN [id number]   Location [facility] 4SW-A Attending Destin Walker, DO   Hosp Day # 5 PCP Jose Mills MD     Chief Complaint: cough, SOB    Subjective:     Frustrated that he's still not off O2 and ready to leave hospital. Using IS/flutter and continuing to walk halls several times daily    Objective:    Review of Systems:   A comprehensive review of systems was completed; pertinent positive and negatives stated in subjective.    Vital signs:  Temp:  [97.4 °F (36.3 °C)-98.2 °F (36.8 °C)] 97.6 °F (36.4 °C)  Pulse:  [60-85] 80  Resp:  [16-18] 18  BP: (131-140)/(74-81) 131/74  SpO2:  [88 %-95 %] 91 %    Physical Exam:    General: No acute distress  Respiratory: No wheezes, no rhonchi, coarse throughout  Cardiovascular: S1, S2, regular rate and rhythm  Abdomen: Soft, Non-tender, non-distended, positive bowel sounds  Neuro: No new focal deficits.   Extremities: No edema      Diagnostic Data:    Labs:  Recent Labs   Lab 24  1334 24  0450 24  0441 24  0450   WBC 11.8* 14.7* 10.2 16.8*   HGB 17.5 13.9 15.2 13.3   MCV 96.9 97.4 99.2 96.6   .0 147.0* 143.0* 164.0   BAND  --   --   --  15       Recent Labs   Lab 24  1334 24  0450 24  0441 24  0716 24  1151 24  0450   * 97 164*  --   --  169*   BUN 18 19 22  --   --  31*   CREATSERUM 2.04* 1.48* 1.83*  --   --  1.56*   CA 9.3 8.1* 9.6  --   --  9.5   ALB 4.2  --   --   --   --   --     137 135*  --   --  139   K 4.2 4.7 5.6* 5.3* 5.0 5.0    111 107  --   --  107   CO2 26.0 24.0 23.0  --   --  26.0   ALKPHO 96  --   --   --   --   --    AST 29  --   --   --   --   --    ALT 34  --   --   --   --   --    BILT 0.4  --   --   --   --   --    TP 8.5*  --   --   --   --   --        Estimated Creatinine Clearance: 57.6 mL/min (A) (based on SCr of 1.56 mg/dL  (H)).    Recent Labs   Lab 03/29/24  0441   TROPHS 19       No results for input(s): \"PTP\", \"INR\" in the last 168 hours.     Microbiology    Hospital Encounter on 03/27/24   1. Blood Culture     Status: None (Preliminary result)    Collection Time: 03/27/24  1:33 PM    Specimen: Blood,peripheral   Result Value Ref Range    Blood Culture Result No Growth 4 Days N/A         Imaging: Reviewed in Epic.    Medications:    fluticasone-umeclidin-vilant  1 puff Inhalation Daily    predniSONE  40 mg Oral Daily with breakfast    metoprolol  5 mg Intravenous See Admin Instructions    Or    dilTIAZem  5 mg Intravenous See Admin Instructions    oseltamivir  30 mg Oral Q12H    ipratropium-albuterol  3 mL Nebulization 6 times per day    cefTRIAXone  2 g Intravenous Q24H    enoxaparin  40 mg Subcutaneous Daily    metoprolol tartrate  50 mg Oral Once    Or    metoprolol tartrate  100 mg Oral Once    metoprolol succinate  12.5 mg Oral Daily Beta Blocker    [Held by provider] losartan  25 mg Oral Daily    guaiFENesin ER  600 mg Oral BID    febuxostat  80 mg Oral Daily    oxyCODONE ER  40 mg Oral 2 times per day       Assessment & Plan:      #Acute hypoxic respiratory failure due to influenza B infection, BLL PNA, new onset HFrEF  -Wean oxygen as tolerated  -PRN nebs  -IV steroids, inhalers  -Pulm following     #Influenza B infection  -Tamiflu  -Cough medications    #Leukocytosis, suspect d/t steroids  - follow CBC    #New onset HFrEF; may be d/t viral infection  - echo with EF 35%  - CTA coronary arteries reviewed  - metoprolol, losartan started   - cardiology consulted > recommend outpt f/u after discharge     #Sepsis POA likely due to influenza infection, superimposed bacterial infection  - S/p sepsis bolus on admission  - Follow blood cultures  - empiric ceftriaxone/azithromycin     #LORE on CKD stage III  -improving     #Pseudomyxoma peritonei   -Sees Dr. Tapia and is doing well clinically and radiographically.  Most recent CT  scans were stable findings and tumor markers were unremarkable.  He follows up every 6 months  -home pain meds    #Gouty arthritis  -Follows with Dr. Zohreh Amin  -Chapin continued      Pam Patel DO    Supplementary Documentation:     Quality:  DVT Mechanical Prophylaxis:   SCDs,    DVT Pharmacologic Prophylaxis   Medication    enoxaparin (Lovenox) 40 MG/0.4ML SUBQ injection 40 mg                Code Status: Full Code  Knott: No urinary catheter in place  Knott Duration (in days):   Central line:    IRVING: 4/3/2024    Discharge is dependent on: clinical state  At this point Mr. Arellano is expected to be discharge to: TBD    The 21st Century Cures Act makes medical notes like these available to patients in the interest of transparency. Please be advised this is a medical document. Medical documents are intended to carry relevant information, facts as evident, and the clinical opinion of the practitioner. The medical note is intended as peer to peer communication and may appear blunt or direct. It is written in medical language and may contain abbreviations or verbiage that are unfamiliar.             **Certification      PHYSICIAN Certification of Need for Inpatient Hospitalization - Initial Certification    Patient will require inpatient services that will reasonably be expected to span two midnight's based on the clinical documentation in H+P.   Based on patients current state of illness, I anticipate that, after discharge, patient will require TBD.

## 2024-04-01 NOTE — PROGRESS NOTES
04/01/24 1522   Mobility   O2 walk? Yes   SPO2% Ambulation on Oxygen 92   Ambulation oxygen flow (liters per minute) 4

## 2024-04-01 NOTE — PHYSICAL THERAPY NOTE
PHYSICAL THERAPY TREATMENT NOTE - INPATIENT    Room Number: 2621/2621-A     Session: 1     Number of Visits to Meet Established Goals: 3    Presenting Problem: +influezna  Co-Morbidities : pseudomyoxma peritonei, gerd, gout, neuropathic pain, h/o opioid abuse    PHYSICAL THERAPY MEDICAL/SOCIAL HISTORY  History related to current admission: Patient is a 58 year old male admitted on 3/27/2024 from home for influenza.  Pt underwent gated CTA 3/29/24.     HOME SITUATION  Type of Home: House   Home Layout: One level     Lives With:  (sister and brother in law)  Patient Owned Equipment: None     Prior Level of [place]: Pt reports ind PTA.  Pt reports has cane, RW, and w/c from self and family members.      ASSESSMENT   Patient demonstrates good  progress this session, goals  progressing with 2/3 met this session. Patient declined attempting stairs as he does not have stairs to do at home.     Patient continues to function near baseline with bed mobility, transfers, and gait.  Contributing factors to remaining limitations include decreased endurance/aerobic capacity.   patient for duration of hospitalization.    Patient continues to benefit from continued skilled PT services: at discharge to promote functional independence in home.  Anticipate patient will return home with home health PT.    Patient currently does not meet criteria for skilled inpatient physical therapy services, however patient will continue to benefit from QID ambulation with nursing staff. Pt is discharged from IP PT services.  RN aware to re-order if there is a decline in mobility status.      PLAN  PT Treatment Plan: Bed mobility;Body mechanics;Endurance;Energy conservation;Patient education;Family education;Gait training;Strengthening;Transfer training;Stair training;Balance training  Rehab Potential : Good  Frequency (Obs):  (2-3x/week)    CURRENT GOALS   Goal #1 Patient is able to negotiate 1 flight of stairs with supervision to ensure  safety at dc.      Goal #2 Patient is able to demonstrate transfers Sit to/from Stand at assistance level: supervision      Goal #3 Patient is able to ambulate 50 feet with assist device: walker - rolling at assistance level: supervision      Goal #4     Goal #5     Goal #6     Goal Comments: Goals established on 3/29/2024  2024 all goals achieved (pt declined attempting stairs as he does not have to do stairs at home)    SUBJECTIVE  \"I could walk around here all day if they'd let me\"    OBJECTIVE  Precautions: Bed/chair alarm    WEIGHT BEARING RESTRICTION  Weight Bearing Restriction: None                PAIN ASSESSMENT   Ratin          BALANCE                                                                                                                       Static Sitting: Good  Dynamic Sitting: Good           Static Standing: Good  Dynamic Standing: Good    ACTIVITY TOLERANCE   Denies dizziness                      O2 WALK   RN present for O2 walk. Pt started on 10 L O2 with SPO2 97%. Pt weaned to 4 L O2 with SPO2 92%.       AM-PAC '6-Clicks' INPATIENT SHORT FORM - BASIC MOBILITY  How much difficulty does the patient currently have...  Patient Difficulty: Turning over in bed (including adjusting bedclothes, sheets and blankets)?: None   Patient Difficulty: Sitting down on and standing up from a chair with arms (e.g., wheelchair, bedside commode, etc.): None   Patient Difficulty: Moving from lying on back to sitting on the side of the bed?: None   How much help from another person does the patient currently need...   Help from Another: Moving to and from a bed to a chair (including a wheelchair)?: None   Help from Another: Need to walk in hospital room?: None   Help from Another: Climbing 3-5 steps with a railing?: A Little       AM-PAC Score:  Raw Score: 23   Approx Degree of Impairment: 11.2%   Standardized Score (AM-PAC Scale): 56.93   CMS Modifier (G-Code): CI    FUNCTIONAL ABILITY STATUS  Gait  Assessment   Functional Mobility/Gait Assessment  Gait Assistance: Supervision;Independent  Distance (ft): 500  Assistive Device: None  Pattern: Within Functional Limits    Skilled Therapy Provided: Per RN okay to work with pt. Pt received in chair and was agreeable to PT session.     Bed Mobility:  Rolling: NT   Supine<>Sit: NT   Sit<>Supine: NT     Transfer Mobility:  Sit<>Stand: independent    Stand<>Sit: independent    Gait: pt ambulated around the entire unit with no AD and supervision that progressed to independent     See O2 walk above for cardiopulmonary details.     Pt ambulated to/from the bathroom independently and completed bathroom tasks independently.     Therapist's Comments: Pt educated on role of therapy, goals for session, safety, fall prevention, and activity recommendations. Pt encouraged to be up to the chair for meals, ambulating to/from bathroom, and ambulating 3-4 times per day with nursing in the halls while still in the hospital. Pt verbalized understanding.       Patient End of Session: Up in chair;Needs met;Call light within reach;RN aware of session/findings;All patient questions and concerns addressed;Family present    PT Session Time: 23 minutes  Gait Training: 15 minutes  Therapeutic Activity: 8 minutes

## 2024-04-01 NOTE — PLAN OF CARE
Pt is A&O x4.  Reports no pain. Denies cardiac symptoms.  Maintaining O2 on 2L NC. TRIVEDI. Productive cough w/ thick white sputum.  NSR on tele, S1&S2 present.  Bowel sounds active in all quadrants.   Continent of bowel and bladder.  Pt updated on plan of care.  Bed in lowest position. Bed alarm on. Call light within reach.     Problem: RESPIRATORY - ADULT  Goal: Achieves optimal ventilation and oxygenation  Description: INTERVENTIONS:  - Assess for changes in respiratory status  - Assess for changes in mentation and behavior  - Position to facilitate oxygenation and minimize respiratory effort  - Oxygen supplementation based on oxygen saturation or ABGs  - Provide Smoking Cessation handout, if applicable  - Encourage broncho-pulmonary hygiene including cough, deep breathe, Incentive Spirometry  - Assess the need for suctioning and perform as needed  - Assess and instruct to report SOB or any respiratory difficulty  - Respiratory Therapy support as indicated  - Manage/alleviate anxiety  - Monitor for signs/symptoms of CO2 retention  Outcome: Progressing     Problem: SAFETY ADULT - FALL  Goal: Free from fall injury  Description: INTERVENTIONS:  - Assess pt frequently for physical needs  - Identify cognitive and physical deficits and behaviors that affect risk of falls.  - [place] fall precautions as indicated by assessment.  - Educate pt/family on patient safety including physical limitations  - Instruct pt to call for assistance with activity based on assessment  - Modify environment to reduce risk of injury  - Provide assistive devices as appropriate  - Consider OT/PT consult to assist with strengthening/mobility  - Encourage toileting schedule  Outcome: Progressing     Problem: CARDIOVASCULAR - ADULT  Goal: Maintains optimal cardiac output and hemodynamic stability  Description: INTERVENTIONS:  - Monitor vital signs, rhythm, and trends  - Monitor for bleeding, hypotension and signs of decreased cardiac output  -  Evaluate effectiveness of vasoactive medications to optimize hemodynamic stability  - Monitor arterial and/or venous puncture sites for bleeding and/or hematoma  - Assess quality of pulses, skin color and temperature  - Assess for signs of decreased coronary artery perfusion - ex. Angina  - Evaluate fluid balance, assess for edema, trend weights  Outcome: Progressing  Goal: Absence of cardiac arrhythmias or at baseline  Description: INTERVENTIONS:  - Continuous cardiac monitoring, monitor vital signs, obtain 12 lead EKG if indicated  - Evaluate effectiveness of antiarrhythmic and heart rate control medications as ordered  - Initiate emergency measures for life threatening arrhythmias  - Monitor electrolytes and administer replacement therapy as ordered  Outcome: Progressing

## 2024-04-01 NOTE — PROGRESS NOTES
Pulmonary Progress Note        NAME: Pancho Arellano - ROOM: [facility]2621-A - MRN: [id number] - Age: 58 year old - : 1965        Last 24hrs: No events overnight, frustrated that his recovery is taking awhile    OBJECTIVE:  Vitals:    24 1734 24 1940 24 0030 24 0800   BP:  135/78 131/74    BP Location:  Right arm Right arm    Pulse: 85 84 80    Resp:  17 18    Temp:  98.2 °F (36.8 °C) 97.6 °F (36.4 °C)    TempSrc:  Oral Oral    SpO2: 93% (!) 88% 91% 91%   Weight:           Oxygen Therapy  SpO2: 91 %  O2 Device: Nasal cannula  FiO2 (%): 30 %  O2 Flow Rate (L/min): 4 L/min  Pulse Oximetry Type: Continuous  Oximetry Probe Site Changed: No  Pulse Ox Probe Location: Right hand                  Intake/Output Summary (Last 24 hours) at 2024 0955  Last data filed at 3/31/2024 1940  Gross per 24 hour   Intake 840 ml   Output 1 ml   Net 839 ml       Scheduled Medication:   fluticasone-umeclidin-vilant  1 puff Inhalation Daily    predniSONE  40 mg Oral Daily with breakfast    metoprolol  5 mg Intravenous See Admin Instructions    Or    dilTIAZem  5 mg Intravenous See Admin Instructions    ipratropium-albuterol  3 mL Nebulization 6 times per day    cefTRIAXone  2 g Intravenous Q24H    enoxaparin  40 mg Subcutaneous Daily    metoprolol tartrate  50 mg Oral Once    Or    metoprolol tartrate  100 mg Oral Once    metoprolol succinate  12.5 mg Oral Daily Beta Blocker    [Held by provider] losartan  25 mg Oral Daily    guaiFENesin ER  600 mg Oral BID    febuxostat  80 mg Oral Daily    oxyCODONE ER  40 mg Oral 2 times per day     Continuous Infusing Medication:    Lungs: clear to auscultation bilaterally  Heart: S1, S2 normal, no murmur, click, rub or gallop, regular rate and rhythm  Abdomen: soft, non-tender; bowel sounds normal; no masses,  no organomegaly  Extremities: extremities normal, atraumatic, no cyanosis or edema    Labs reviewed as noted below        ASSESSMENT/PLAN:    Acute respiratory  failure - 2/2 influenza B and superimposed LLL bacterial PNA with significant areas of atelectasis  -weaned off NIPPV  -wean O2 as able on 3L  -bronchial hygiene, encourage flutter valve and ambulation; encouraged IS  -BD nebs  COPD? Vs asthma  -prednisone taper  - transitioned off budesonide/formoterol nebs to trelegy  -needs opt PFTs  ID-  influenza- finished tamiflu  - bacterial PNA- sputum culture contaminated  - cont with ceft (3/28- ),  finished azithro  - urine legionella/strep ag negative  Proph- LMWH  Dispo- full code  - will follow      Luis Pat  [facility]  Pulmonary and Critical Care

## 2024-04-01 NOTE — PLAN OF CARE
Assumed patient care around 0730 am. Patient A&o x4. Maintaining Spo2 on 3L of oxygen per nasal cannula. Patient requires 4L of oxygen with ambulation. Rhonci noted bilaterally. IS best effort 2000. IS use encouraged. NSR on tele. On Lovenox for DVT prophylaxis. Continent of bowel and bladder. Last BM yesterday 3/31 per patient. Denies pain at this time. Updated on POC, needs met.         Problem: SAFETY ADULT - FALL  Goal: Free from fall injury  Description: INTERVENTIONS:  - Assess pt frequently for physical needs  - Identify cognitive and physical deficits and behaviors that affect risk of falls.  - [place] fall precautions as indicated by assessment.  - Educate pt/family on patient safety including physical limitations  - Instruct pt to call for assistance with activity based on assessment  - Modify environment to reduce risk of injury  - Provide assistive devices as appropriate  - Consider OT/PT consult to assist with strengthening/mobility  - Encourage toileting schedule  Outcome: Progressing     Problem: CARDIOVASCULAR - ADULT  Goal: Maintains optimal cardiac output and hemodynamic stability  Description: INTERVENTIONS:  - Monitor vital signs, rhythm, and trends  - Monitor for bleeding, hypotension and signs of decreased cardiac output  - Evaluate effectiveness of vasoactive medications to optimize hemodynamic stability  - Monitor arterial and/or venous puncture sites for bleeding and/or hematoma  - Assess quality of pulses, skin color and temperature  - Assess for signs of decreased coronary artery perfusion - ex. Angina  - Evaluate fluid balance, assess for edema, trend weights  Outcome: Progressing  Goal: Absence of cardiac arrhythmias or at baseline  Description: INTERVENTIONS:  - Continuous cardiac monitoring, monitor vital signs, obtain 12 lead EKG if indicated  - Evaluate effectiveness of antiarrhythmic and heart rate control medications as ordered  - Initiate emergency measures for life threatening  arrhythmias  - Monitor electrolytes and administer replacement therapy as ordered  Outcome: Progressing     Problem: Patient/Family Goals  Goal: Patient/Family Long Term Goal  Description: Patient's Long Term Goal: \"Stay out of the hospital\"     Interventions:  - Take medications as prescribed  - Attend follow-up appointments  - Increase activity as tolerated   - See additional Care Plan goals for specific interventions  Outcome: Progressing  Goal: Patient/Family Short Term Goal  Description: Patient's Short Term Goal: \"Go home\"     Interventions:   - Wean oxygen as tolerated  - Oxygen walk   - Incentive spirometry and flutter valve   - Tamiflu BID   - IV abx  - Nebs   - See additional Care Plan goals for specific interventions  Outcome: Progressing     Problem: RESPIRATORY - ADULT  Goal: Achieves optimal ventilation and oxygenation  Description: INTERVENTIONS:  - Assess for changes in respiratory status  - Assess for changes in mentation and behavior  - Position to facilitate oxygenation and minimize respiratory effort  - Oxygen supplementation based on oxygen saturation or ABGs  - Provide Smoking Cessation handout, if applicable  - Encourage broncho-pulmonary hygiene including cough, deep breathe, Incentive Spirometry  - Assess the need for suctioning and perform as needed  - Assess and instruct to report SOB or any respiratory difficulty  - Respiratory Therapy support as indicated  - Manage/alleviate anxiety  - Monitor for signs/symptoms of CO2 retention  Outcome: Not Progressing

## 2024-04-01 NOTE — PROGRESS NOTES
04/01/24 0850   Mobility   O2 walk? Yes   SPO2% on Oxygen at Rest 92   At rest oxygen flow (liters per minute) 4   SPO2% Ambulation on Oxygen 90   Ambulation oxygen flow (liters per minute) 10     Pt requiring frequent reminders to breathe through nose, not mouth. Will reattempt this afternoon.

## 2024-04-02 ENCOUNTER — APPOINTMENT (OUTPATIENT)
Dept: GENERAL RADIOLOGY | Facility: HOSPITAL | Age: 59
End: 2024-04-02
Attending: INTERNAL MEDICINE
Payer: MEDICARE

## 2024-04-02 LAB
ANION GAP SERPL CALC-SCNC: 9 MMOL/L (ref 0–18)
BUN BLD-MCNC: 24 MG/DL (ref 9–23)
CALCIUM BLD-MCNC: 9.5 MG/DL (ref 8.5–10.1)
CHLORIDE SERPL-SCNC: 105 MMOL/L (ref 98–112)
CO2 SERPL-SCNC: 24 MMOL/L (ref 21–32)
CREAT BLD-MCNC: 1.34 MG/DL
EGFRCR SERPLBLD CKD-EPI 2021: 61 ML/MIN/1.73M2 (ref 60–?)
GLUCOSE BLD-MCNC: 116 MG/DL (ref 70–99)
NT-PROBNP SERPL-MCNC: 9220 PG/ML (ref ?–125)
OSMOLALITY SERPL CALC.SUM OF ELEC: 291 MOSM/KG (ref 275–295)
POTASSIUM SERPL-SCNC: 4.1 MMOL/L (ref 3.5–5.1)
SODIUM SERPL-SCNC: 138 MMOL/L (ref 136–145)

## 2024-04-02 PROCEDURE — 99232 SBSQ HOSP IP/OBS MODERATE 35: CPT | Performed by: INTERNAL MEDICINE

## 2024-04-02 PROCEDURE — 71046 X-RAY EXAM CHEST 2 VIEWS: CPT | Performed by: INTERNAL MEDICINE

## 2024-04-02 RX ORDER — FUROSEMIDE 10 MG/ML
40 INJECTION INTRAMUSCULAR; INTRAVENOUS ONCE
Status: COMPLETED | OUTPATIENT
Start: 2024-04-02 | End: 2024-04-02

## 2024-04-02 NOTE — PROGRESS NOTES
04/02/24 1500   Mobility   O2 walk? Yes   SPO2% on Room Air at Rest 90   SPO2% on Oxygen at Rest 92   At rest oxygen flow (liters per minute) 2   SPO2% Ambulation on Room Air 86   SPO2% Ambulation on Oxygen 88   Ambulation oxygen flow (liters per minute) 2

## 2024-04-02 NOTE — PLAN OF CARE
Pt is A&O x4.  Reports no pain. Denies SOB & cardiac symptoms.  Maintaining O2 on 2L NC. Productive cough w/ thick white sputum.  NSR on tele, S1&S2 present.  Bowel sounds active in all quadrants.   Continent of bowel and bladder.  Pt updated on plan of care.  Bed in lowest position. Bed alarm on. Call light within reach.     Problem: RESPIRATORY - ADULT  Goal: Achieves optimal ventilation and oxygenation  Description: INTERVENTIONS:  - Assess for changes in respiratory status  - Assess for changes in mentation and behavior  - Position to facilitate oxygenation and minimize respiratory effort  - Oxygen supplementation based on oxygen saturation or ABGs  - Provide Smoking Cessation handout, if applicable  - Encourage broncho-pulmonary hygiene including cough, deep breathe, Incentive Spirometry  - Assess the need for suctioning and perform as needed  - Assess and instruct to report SOB or any respiratory difficulty  - Respiratory Therapy support as indicated  - Manage/alleviate anxiety  - Monitor for signs/symptoms of CO2 retention  Outcome: Progressing     Problem: SAFETY ADULT - FALL  Goal: Free from fall injury  Description: INTERVENTIONS:  - Assess pt frequently for physical needs  - Identify cognitive and physical deficits and behaviors that affect risk of falls.  - [place] fall precautions as indicated by assessment.  - Educate pt/family on patient safety including physical limitations  - Instruct pt to call for assistance with activity based on assessment  - Modify environment to reduce risk of injury  - Provide assistive devices as appropriate  - Consider OT/PT consult to assist with strengthening/mobility  - Encourage toileting schedule  Outcome: Progressing     Problem: CARDIOVASCULAR - ADULT  Goal: Maintains optimal cardiac output and hemodynamic stability  Description: INTERVENTIONS:  - Monitor vital signs, rhythm, and trends  - Monitor for bleeding, hypotension and signs of decreased cardiac output  -  Evaluate effectiveness of vasoactive medications to optimize hemodynamic stability  - Monitor arterial and/or venous puncture sites for bleeding and/or hematoma  - Assess quality of pulses, skin color and temperature  - Assess for signs of decreased coronary artery perfusion - ex. Angina  - Evaluate fluid balance, assess for edema, trend weights  Outcome: Progressing  Goal: Absence of cardiac arrhythmias or at baseline  Description: INTERVENTIONS:  - Continuous cardiac monitoring, monitor vital signs, obtain 12 lead EKG if indicated  - Evaluate effectiveness of antiarrhythmic and heart rate control medications as ordered  - Initiate emergency measures for life threatening arrhythmias  - Monitor electrolytes and administer replacement therapy as ordered  Outcome: Progressing     Problem: Patient/Family Goals  Goal: Patient/Family Long Term Goal  Description: Patient's Long Term Goal: \"Stay out of the hospital\"     Interventions:  - Take medications as prescribed  - Attend follow-up appointments  - Increase activity as tolerated   - See additional Care Plan goals for specific interventions  Outcome: Progressing  Goal: Patient/Family Short Term Goal  Description: Patient's Short Term Goal: \"Go home\"     Interventions:   - Wean oxygen as tolerated  - Oxygen walk   - Incentive spirometry and flutter valve   - Tamiflu BID   - IV abx  - Nebs   - See additional Care Plan goals for specific interventions  Outcome: Progressing

## 2024-04-02 NOTE — PROGRESS NOTES
[facility]   part of [facility]     Hospitalist Progress Note     Pancholaura Arellano Patient Status:  Inpatient    1965 MRN [id number]   Location [facility] 4SW-A Attending Destin Walker, DO   Hosp Day # 6 PCP Jose Mills MD     Chief Complaint: cough, SOB    Subjective:     O2 requirements improving this morning, feeling much better. Hopeful he can go home later today if O2 walk is within parameters     Objective:    Review of Systems:   A comprehensive review of systems was completed; pertinent positive and negatives stated in subjective.    Vital signs:  Temp:  [97.5 °F (36.4 °C)-98 °F (36.7 °C)] 97.5 °F (36.4 °C)  Pulse:  [59-81] 81  Resp:  [14-24] 14  BP: (107-142)/(62-88) 107/62  SpO2:  [91 %-97 %] 93 %    Physical Exam:    General: No acute distress  Respiratory: No wheezes, no rhonchi, coarse throughout  Cardiovascular: S1, S2, regular rate and rhythm  Abdomen: Soft, Non-tender, non-distended, positive bowel sounds  Neuro: No new focal deficits.   Extremities: No edema      Diagnostic Data:    Labs:  Recent Labs   Lab 24  1334 24  0450 24  0441 24  0450   WBC 11.8* 14.7* 10.2 16.8*   HGB 17.5 13.9 15.2 13.3   MCV 96.9 97.4 99.2 96.6   .0 147.0* 143.0* 164.0   BAND  --   --   --  15       Recent Labs   Lab 24  1334 24  0450 24  0441 24  0716 24  1151 24  0450   * 97 164*  --   --  169*   BUN 18 19 22  --   --  31*   CREATSERUM 2.04* 1.48* 1.83*  --   --  1.56*   CA 9.3 8.1* 9.6  --   --  9.5   ALB 4.2  --   --   --   --   --     137 135*  --   --  139   K 4.2 4.7 5.6* 5.3* 5.0 5.0    111 107  --   --  107   CO2 26.0 24.0 23.0  --   --  26.0   ALKPHO 96  --   --   --   --   --    AST 29  --   --   --   --   --    ALT 34  --   --   --   --   --    BILT 0.4  --   --   --   --   --    TP 8.5*  --   --   --   --   --        Estimated Creatinine Clearance: 57.6 mL/min (A) (based on SCr of 1.56 mg/dL  (H)).    Recent Labs   Lab 03/29/24  0441   TROPHS 19       No results for input(s): \"PTP\", \"INR\" in the last 168 hours.     Microbiology    Hospital Encounter on 03/27/24   1. Blood Culture     Status: None    Collection Time: 03/27/24  1:33 PM    Specimen: Blood,peripheral   Result Value Ref Range    Blood Culture Result No Growth 5 Days N/A         Imaging: Reviewed in Epic.    Medications:    furosemide  40 mg Intravenous Once    ipratropium-albuterol  3 mL Nebulization Q6H    fluticasone-umeclidin-vilant  1 puff Inhalation Daily    predniSONE  40 mg Oral Daily with breakfast    metoprolol  5 mg Intravenous See Admin Instructions    Or    dilTIAZem  5 mg Intravenous See Admin Instructions    cefTRIAXone  2 g Intravenous Q24H    enoxaparin  40 mg Subcutaneous Daily    metoprolol tartrate  50 mg Oral Once    Or    metoprolol tartrate  100 mg Oral Once    metoprolol succinate  12.5 mg Oral Daily Beta Blocker    [Held by provider] losartan  25 mg Oral Daily    guaiFENesin ER  600 mg Oral BID    febuxostat  80 mg Oral Daily    oxyCODONE ER  40 mg Oral 2 times per day       Assessment & Plan:      #Acute hypoxic respiratory failure due to influenza B infection, BLL PNA, new onset HFrEF  -Wean oxygen as tolerated  -PRN nebs  -steroid course/taper, inhalers  -repeat CXR/proBNP ordered  -lasix 40mg IV x1 dose this morning  -ambulatory O2 assessment   -Pulm following     #Influenza B infection  -completed course of Tamiflu  -Cough medications    #Leukocytosis, suspect d/t steroids  - follow CBC    #New onset HFrEF; may be d/t viral infection  - echo with EF 35%  - CTA coronary arteries reviewed  - metoprolol, losartan started   - cardiology signed off > recommend outpt f/u after discharge     #Sepsis POA likely due to influenza infection, superimposed bacterial infection  - S/p sepsis bolus on admission  - Follow blood cultures  - empiric ceftriaxone/azithromycin     #LORE on CKD stage III  -improving     #Pseudomyxoma  peritonei   -Sees Dr. Tapia and is doing well clinically and radiographically.  Most recent CT scans were stable findings and tumor markers were unremarkable.  He follows up every 6 months  -home pain meds    #Gouty arthritis  -Follows with Dr. Zohreh Amin  -[place] continued      Pam Patel,     Supplementary Documentation:     Quality:  DVT Mechanical Prophylaxis:   SCDs,    DVT Pharmacologic Prophylaxis   Medication    enoxaparin (Lovenox) 40 MG/0.4ML SUBQ injection 40 mg                Code Status: Full Code  Knott: No urinary catheter in place  Knott Duration (in days):   Central line:    IRVING: 4/2/2024    Discharge is dependent on: clinical state  At this point Mr. Arellano is expected to be discharge to: TBD    The 21st Century Cures Act makes medical notes like these available to patients in the interest of transparency. Please be advised this is a medical document. Medical documents are intended to carry relevant information, facts as evident, and the clinical opinion of the practitioner. The medical note is intended as peer to peer communication and may appear blunt or direct. It is written in medical language and may contain abbreviations or verbiage that are unfamiliar.             **Certification      PHYSICIAN Certification of Need for Inpatient Hospitalization - Initial Certification    Patient will require inpatient services that will reasonably be expected to span two midnight's based on the clinical documentation in H+P.   Based on patients current state of illness, I anticipate that, after discharge, patient will require TBD.

## 2024-04-02 NOTE — PLAN OF CARE
Assumed care of patient at change of shift sitting up in chair. AO x4. NSR on tele monitor. O2 sat adequate on 2L nasal cannula. Plan of care updated. Chair locked. Call [place] and personal items in reach. All needs met.    Problem: RESPIRATORY - ADULT  Goal: Achieves optimal ventilation and oxygenation  Description: INTERVENTIONS:  - Assess for changes in respiratory status  - Assess for changes in mentation and behavior  - Position to facilitate oxygenation and minimize respiratory effort  - Oxygen supplementation based on oxygen saturation or ABGs  - Provide Smoking Cessation handout, if applicable  - Encourage broncho-pulmonary hygiene including cough, deep breathe, Incentive Spirometry  - Assess the need for suctioning and perform as needed  - Assess and instruct to report SOB or any respiratory difficulty  - Respiratory Therapy support as indicated  - Manage/alleviate anxiety  - Monitor for signs/symptoms of CO2 retention  Outcome: Progressing     Problem: SAFETY ADULT - FALL  Goal: Free from fall injury  Description: INTERVENTIONS:  - Assess pt frequently for physical needs  - Identify cognitive and physical deficits and behaviors that affect risk of falls.  - [place] fall precautions as indicated by assessment.  - Educate pt/family on patient safety including physical limitations  - Instruct pt to call for assistance with activity based on assessment  - Modify environment to reduce risk of injury  - Provide assistive devices as appropriate  - Consider OT/PT consult to assist with strengthening/mobility  - Encourage toileting schedule  Outcome: Progressing     Problem: CARDIOVASCULAR - ADULT  Goal: Maintains optimal cardiac output and hemodynamic stability  Description: INTERVENTIONS:  - Monitor vital signs, rhythm, and trends  - Monitor for bleeding, hypotension and signs of decreased cardiac output  - Evaluate effectiveness of vasoactive medications to optimize hemodynamic stability  - Monitor arterial  and/or venous puncture sites for bleeding and/or hematoma  - Assess quality of pulses, skin color and temperature  - Assess for signs of decreased coronary artery perfusion - ex. Angina  - Evaluate fluid balance, assess for edema, trend weights  Outcome: Progressing  Goal: Absence of cardiac arrhythmias or at baseline  Description: INTERVENTIONS:  - Continuous cardiac monitoring, monitor vital signs, obtain 12 lead EKG if indicated  - Evaluate effectiveness of antiarrhythmic and heart rate control medications as ordered  - Initiate emergency measures for life threatening arrhythmias  - Monitor electrolytes and administer replacement therapy as ordered  Outcome: Progressing     Problem: Patient/Family Goals  Goal: Patient/Family Long Term Goal  Description: Patient's Long Term Goal: \"Stay out of the hospital\"     Interventions:  - Take medications as prescribed  - Attend follow-up appointments  - Increase activity as tolerated   - See additional Care Plan goals for specific interventions  Outcome: Progressing  Goal: Patient/Family Short Term Goal  Description: Patient's Short Term Goal: \"Go home\"     Interventions:   - Wean oxygen as tolerated  - Oxygen walk   - Incentive spirometry and flutter valve   - Tamiflu BID   - IV abx  - Nebs   - See additional Care Plan goals for specific interventions  Outcome: Progressing

## 2024-04-02 NOTE — PROGRESS NOTES
Pulmonary Progress Note        NAME: Pancho Arellano - ROOM: [facility]2621-A - MRN: [id number] - Age: 58 year old - : 1965        Last 24hrs: No events overnight, pt in better spirits today    OBJECTIVE:  Vitals:    24 0122 24 0507 24 0830 24 1306   BP:  130/72 107/62 126/78   BP Location:  Right arm Right arm Right arm   Pulse: 59 60 81 78   Resp: 18 18 14 20   Temp:  97.7 °F (36.5 °C) 97.5 °F (36.4 °C) 97.7 °F (36.5 °C)   TempSrc:  Oral Oral Oral   SpO2: 97% 92% 93% 98%   Weight:           Oxygen Therapy  SpO2: 98 %  O2 Device: Nasal cannula  FiO2 (%): 30 %  O2 Flow Rate (L/min): 2 L/min  Pulse Oximetry Type: Continuous  Oximetry Probe Site Changed: Yes  Pulse Ox Probe Location: Ear lobe                  Intake/Output Summary (Last 24 hours) at 2024 1436  Last data filed at 2024 0913  Gross per 24 hour   Intake 100 ml   Output 500 ml   Net -400 ml       Scheduled Medication:   ipratropium-albuterol  3 mL Nebulization Q6H    fluticasone-umeclidin-vilant  1 puff Inhalation Daily    predniSONE  40 mg Oral Daily with breakfast    metoprolol  5 mg Intravenous See Admin Instructions    Or    dilTIAZem  5 mg Intravenous See Admin Instructions    cefTRIAXone  2 g Intravenous Q24H    enoxaparin  40 mg Subcutaneous Daily    metoprolol tartrate  50 mg Oral Once    Or    metoprolol tartrate  100 mg Oral Once    metoprolol succinate  12.5 mg Oral Daily Beta Blocker    [Held by provider] losartan  25 mg Oral Daily    guaiFENesin ER  600 mg Oral BID    febuxostat  80 mg Oral Daily    oxyCODONE ER  40 mg Oral 2 times per day     Continuous Infusing Medication:    Lungs: clear to auscultation bilaterally  Heart: S1, S2 normal, no murmur, click, rub or gallop, regular rate and rhythm  Abdomen: soft, non-tender; bowel sounds normal; no masses,  no organomegaly  Extremities: extremities normal, atraumatic, no cyanosis or edema      Labs reviewed as noted below    Imaging: chest x-ray reviewed-  improving infiltrate in left base    ASSESSMENT/PLAN:    Acute respiratory failure - 2/2 influenza B and superimposed LLL bacterial PNA with significant areas of atelectasis  -weaned off NIPPV  -wean O2 as able on RA at rest but needed increased O2 w/ ambulation  -bronchial hygiene, encourage flutter valve and ambulation; encouraged IS  -BD nebs  COPD? Vs asthma  -prednisone taper  - transitioned off budesonide/formoterol nebs to trelegy  -needs opt PFTs  ID-  influenza- finished tamiflu  - bacterial PNA- sputum culture contaminated  - cont with ceft (3/28- ),  finished azithro  - urine legionella/strep ag negative  Proph- LMWH  Dispo- full code  - will follow  - dc pending improvement in O2 needs      Luis Pat  [facility]  Pulmonary and Critical Care

## 2024-04-03 VITALS
WEIGHT: 246.69 LBS | HEART RATE: 75 BPM | DIASTOLIC BLOOD PRESSURE: 77 MMHG | TEMPERATURE: 97 F | OXYGEN SATURATION: 93 % | BODY MASS INDEX: 33 KG/M2 | SYSTOLIC BLOOD PRESSURE: 113 MMHG | RESPIRATION RATE: 22 BRPM

## 2024-04-03 DIAGNOSIS — C78.6 MALIGNANT PSEUDOMYXOMA PERITONEI (CMD): ICD-10-CM

## 2024-04-03 LAB
BASOPHILS # BLD AUTO: 0.09 X10(3) UL (ref 0–0.2)
BASOPHILS NFR BLD AUTO: 0.5 %
EOSINOPHIL # BLD AUTO: 0.07 X10(3) UL (ref 0–0.7)
EOSINOPHIL NFR BLD AUTO: 0.4 %
ERYTHROCYTE [DISTWIDTH] IN BLOOD BY AUTOMATED COUNT: 12 %
HCT VFR BLD AUTO: 45.2 %
HGB BLD-MCNC: 15.6 G/DL
IMM GRANULOCYTES # BLD AUTO: 0.34 X10(3) UL (ref 0–1)
IMM GRANULOCYTES NFR BLD: 2 %
LYMPHOCYTES # BLD AUTO: 3.27 X10(3) UL (ref 1–4)
LYMPHOCYTES NFR BLD AUTO: 19.6 %
MCH RBC QN AUTO: 32.4 PG (ref 26–34)
MCHC RBC AUTO-ENTMCNC: 34.5 G/DL (ref 31–37)
MCV RBC AUTO: 93.8 FL
MONOCYTES # BLD AUTO: 1.74 X10(3) UL (ref 0.1–1)
MONOCYTES NFR BLD AUTO: 10.4 %
NEUTROPHILS # BLD AUTO: 11.18 X10 (3) UL (ref 1.5–7.7)
NEUTROPHILS # BLD AUTO: 11.18 X10(3) UL (ref 1.5–7.7)
NEUTROPHILS NFR BLD AUTO: 67.1 %
PLATELET # BLD AUTO: 315 10(3)UL (ref 150–450)
RBC # BLD AUTO: 4.82 X10(6)UL
WBC # BLD AUTO: 16.7 X10(3) UL (ref 4–11)

## 2024-04-03 PROCEDURE — 99239 HOSP IP/OBS DSCHRG MGMT >30: CPT | Performed by: INTERNAL MEDICINE

## 2024-04-03 RX ORDER — OXYCODONE HCL 40 MG/1
40 TABLET, FILM COATED, EXTENDED RELEASE ORAL EVERY 12 HOURS SCHEDULED
Qty: 60 TABLET | Refills: 0 | Status: SHIPPED | OUTPATIENT
Start: 2024-04-03 | End: 2024-05-03

## 2024-04-03 RX ORDER — PREDNISONE 10 MG/1
20 TABLET ORAL DAILY
Qty: 8 TABLET | Refills: 0 | Status: SHIPPED | OUTPATIENT
Start: 2024-04-03 | End: 2024-04-07

## 2024-04-03 RX ORDER — BENZONATATE 200 MG/1
200 CAPSULE ORAL 3 TIMES DAILY PRN
Qty: 15 CAPSULE | Refills: 0 | Status: SHIPPED | OUTPATIENT
Start: 2024-04-03

## 2024-04-03 NOTE — DISCHARGE SUMMARY
[facility]     Pancho Arellano Patient Status:  Inpatient    1965 MRN [id number]   Location [facility] 2NE-A Attending No att. providers found   Hosp Day # 7 PCP Jose Mills MD     Date of Admission: 3/27/2024  Date of Discharge: 4/3/2024  Discharge Disposition: Home or Self Care    Discharge Diagnosis:   Acute hypoxic respiratory failure, resolved  Influenza B infection bilateral lower lobe pneumonia  Bilateral lower lobe pneumonia  New onset heart failure with reduced ejection fraction  Sepsis present on arrival, resolved  LORE on CKD 3, improved  Pseudomyxoma peritonei  Gouty arthritis    History of Present Illness: Pancho Arellano is a 58 year old male with PMHx pseudomyxoma peritonei and gouty arthritis shingles fever, chills, cough, shortness of breath, congestion and myalgias that have been ongoing for the past 4 days.  His niece and nephew are sick as well with similar symptoms.  He denies any chest pain, nausea, vomiting or dysuria.  He has had multiple episodes of diarrhea.  He has noticed that he was wheezing at home intermittently.  He stopped smoking cigarettes 25 years ago, has a 15-pack-year history.  He still occasionally smokes cigars.  He was in urgent care initially and was saturating in the 80s and so was transferred to ER.  He was placed on nonrebreather with oxygen saturations 91%.  In the ER he was found to be positive for influenza B and was admitted to ICU for close respiratory monitoring.  He is already feeling better since arrival.     Brief Synopsis: Patient was admitted to the ICU for closer monitoring of his respiratory status.  He did receive the sepsis bolus in the ER.  He was started on Tamiflu and IV antibiotics for influenza B infection and bilateral lower lobe pneumonia.  He was found to have new onset systolic heart failure which may be due to viral infection.  He had CTA coronary arteries done showing nonobstructive disease and  cardiology recommended outpatient follow-up.  O2 needs improved, he completed Tamiflu and antibiotics.  He was weaned to room air on discharge.    Lace+ Score: 67  59-90 High Risk  29-58 Medium Risk  0-28   Low Risk       TCM Follow-Up Recommendation:  LACE > 58: High Risk of readmission after discharge from the hospital.    Procedures during hospitalization:   None     Incidental or significant findings and recommendations (brief descriptions):  Please see brief synopsis above    Lab/Test results pending at Discharge:   None     Consultants:  Pulm critical care  Cardiology    Discharge Medication List:     Discharge Medications        START taking these medications        Instructions Prescription details   benzonatate 200 MG Caps  Commonly known as: Tessalon      Take 1 capsule (200 mg total) by mouth 3 (three) times daily as needed for cough.   Quantity: 15 capsule  Refills: 0     fluticasone-umeclidin-vilant 200-62.5-25 MCG/ACT Aepb  Commonly known as: Trelegy Ellipta  Start taking on: April 4, 2024      Inhale 1 puff into the lungs daily.   Quantity: 1 each  Refills: 0     losartan 25 MG Tabs  Commonly known as: Cozaar      Take 1 tablet (25 mg total) by mouth daily.   Quantity: 30 tablet  Refills: 1     metoprolol succinate ER 25 MG Tb24  Commonly known as: Toprol XL      Take 0.5 tablets (12.5 mg total) by mouth Daily Beta Blocker.   Quantity: 60 tablet  Refills: 1     predniSONE 10 MG Tabs  Commonly known as: Deltasone      Take 2 tablets (20 mg total) by mouth daily for 4 days.   Stop taking on: April 7, 2024  Quantity: 8 tablet  Refills: 0            CONTINUE taking these medications        Instructions Prescription details   acetaminophen 500 MG Tabs  Commonly known as: Tylenol Extra Strength      Take 2 tablets (1,000 mg total) by mouth every 6 (six) hours as needed for Pain.   Refills: 0     Febuxostat 80 MG Tabs      TAKE 1 TABLET BY MOUTH DAILY   Quantity: 90 tablet  Refills: 1     oxyCODONE ER 40 MG  T12a  Commonly known as: OxyCONTIN ER      Take 1 tablet (40 mg total) by mouth Q12H.   Refills: 0               Where to Get Your Medications        These medications were sent to [id number] - [place], [place] - [address] AT [facility] [address], [phone], [phone]  [address], [facility] [address]      Phone: [phone]   benzonatate 200 MG Caps  fluticasone-umeclidin-vilant 200-62.5-25 MCG/ACT Aepb  losartan 25 MG Tabs  metoprolol succinate ER 25 MG Tb24  predniSONE 10 MG Tabs         [place] No controlled substances prescribed at discharge.    Follow-up appointment:   Tha Martin MD  [address] [place] [id number] [phone]    Follow up in 2 week(s)  Office will call you to schedule follow up    Zohreh Amin DO  [address]  [address]  [facility] [address] [phone]    Follow up      Jose Mills MD  [address]  [address] [facility] [address]  [facility] [id number] [phone]    Schedule an appointment as soon as possible for a visit      Alejandro Pat IV, MD  [address]  [address]  [facility] [address] [phone]    Follow up in 1 week(s)      Appointments for Next 30 Days 4/3/2024 - 5/3/2024        Date Arrival Time Visit Type Length Department Provider     4/24/2024 11:15 AM  ESTABLISHED/FOLLOW-UP [6643] 15 min [facility] Yonas Tapia MD    Patient Instructions:         Location Instructions:     Your appointment is with [facility]. The address is [address] [place], [place] [address] [facility] [address], [place] [address].   Masks are optional for all patients and visitors, unless otherwise indicated.                      Vital signs:  Temp:  [97.4 °F (36.3 °C)-97.6 °F (36.4 °C)] 97.4 °F (36.3 °C)  Pulse:  [59-90] 75  Resp:  [18-24] 22  BP: (113-134)/(71-81) 113/77  SpO2:  [91 %-96 %] 93 %    Physical Exam:    General: No acute distress. Awake and alert  Respiratory:  Clear to auscultation bilaterally. No wheezes. No rhonchi.  Cardiovascular: S1, S2. Regular rate and rhythm.   Abdomen: Soft, nontender, nondistended.  Positive bowel sounds.   Musculoskeletal: Moves all extremities.  Extremities: No edema.  -----------------------------------------------------------------------------------------------  PATIENT DISCHARGE INSTRUCTIONS: See electronic chart    Destin Walker DO    Time spent:  32 minutes

## 2024-04-03 NOTE — DISCHARGE PLANNING
Discharge instructions reviewed with patient, who acknowledged understanding. Tele box and IV removed. Patient transported to [facility], via wheelchair, with all paperwork and belongings.

## 2024-04-03 NOTE — PROGRESS NOTES
Patient seen and examined. He feels great. He is weaned to room air at rest and with exertion. Plan for discharge home today after seen by pulmonology.     Destin Walker, DO

## 2024-04-03 NOTE — PLAN OF CARE
Pt is A&O x4.  Reports no pain. Denies cardiac symptoms.  Maintaining O2 on 1L NC. Weaned to RA overnight. TRIVEDI.  Productive cough w/ clear sputum.  NSR on tele, S1&S2 present.  Bowel sounds active in all quadrants.   Continent of bowel and bladder.  Pt updated on plan of care.  Bed in lowest position. Bed alarm on. Call light within reach.      Problem: RESPIRATORY - ADULT  Goal: Achieves optimal ventilation and oxygenation  Description: INTERVENTIONS:  - Assess for changes in respiratory status  - Assess for changes in mentation and behavior  - Position to facilitate oxygenation and minimize respiratory effort  - Oxygen supplementation based on oxygen saturation or ABGs  - Provide Smoking Cessation handout, if applicable  - Encourage broncho-pulmonary hygiene including cough, deep breathe, Incentive Spirometry  - Assess the need for suctioning and perform as needed  - Assess and instruct to report SOB or any respiratory difficulty  - Respiratory Therapy support as indicated  - Manage/alleviate anxiety  - Monitor for signs/symptoms of CO2 retention  Outcome: Progressing     Problem: SAFETY ADULT - FALL  Goal: Free from fall injury  Description: INTERVENTIONS:  - Assess pt frequently for physical needs  - Identify cognitive and physical deficits and behaviors that affect risk of falls.  - [place] fall precautions as indicated by assessment.  - Educate pt/family on patient safety including physical limitations  - Instruct pt to call for assistance with activity based on assessment  - Modify environment to reduce risk of injury  - Provide assistive devices as appropriate  - Consider OT/PT consult to assist with strengthening/mobility  - Encourage toileting schedule  Outcome: Progressing     Problem: CARDIOVASCULAR - ADULT  Goal: Maintains optimal cardiac output and hemodynamic stability  Description: INTERVENTIONS:  - Monitor vital signs, rhythm, and trends  - Monitor for bleeding, hypotension and signs of decreased  cardiac output  - Evaluate effectiveness of vasoactive medications to optimize hemodynamic stability  - Monitor arterial and/or venous puncture sites for bleeding and/or hematoma  - Assess quality of pulses, skin color and temperature  - Assess for signs of decreased coronary artery perfusion - ex. Angina  - Evaluate fluid balance, assess for edema, trend weights  Outcome: Progressing  Goal: Absence of cardiac arrhythmias or at baseline  Description: INTERVENTIONS:  - Continuous cardiac monitoring, monitor vital signs, obtain 12 lead EKG if indicated  - Evaluate effectiveness of antiarrhythmic and heart rate control medications as ordered  - Initiate emergency measures for life threatening arrhythmias  - Monitor electrolytes and administer replacement therapy as ordered  Outcome: Progressing     Problem: Patient/Family Goals  Goal: Patient/Family Long Term Goal  Description: Patient's Long Term Goal: \"Stay out of the hospital\"     Interventions:  - Take medications as prescribed  - Attend follow-up appointments  - Increase activity as tolerated   - See additional Care Plan goals for specific interventions  Outcome: Progressing  Goal: Patient/Family Short Term Goal  Description: Patient's Short Term Goal: \"Go home\"     Interventions:   - Wean oxygen as tolerated  - Oxygen walk   - Incentive spirometry and flutter valve   - Tamiflu BID   - IV abx  - Nebs   - See additional Care Plan goals for specific interventions  Outcome: Progressing

## 2024-04-03 NOTE — PROGRESS NOTES
Pulmonary Progress Note     Assessment / Plan:  Acute respiratory failure - 2/2 influenza B and superimposed LLL bacterial PNA with significant areas of atelectasis  -weaned off NIPPV  -on RA  -bronchial hygiene, encourage flutter valve and ambulation; encouraged IS  -BD nebs  Possible COPD vs asthma  -prednisone taper  -transitioned off budesonide/formoterol nebs to trelegy  -needs opt PFTs once appropriate  ID - influenza- finished tamiflu. Possible bacterial pneumonia  -completed course of ceftriaxone and azithromycin  Proph - LMWH  Dispo - full code  -desat screen reviewed with patient. He declined home O2. He verbalized understanding of the risks of hypoxia. Follow-up in one week with MD or APN      Subjective:  Feels great   Wants to go home    Objective:  Vitals:    04/02/24 2030 04/02/24 2330 04/03/24 0400 04/03/24 0852   BP: 134/79 128/76 114/71 113/77   BP Location: Right arm Right arm Left arm Right arm   Pulse: 71 63 59 75   Resp: 18 20 18 22   Temp: 97.6 °F (36.4 °C) 97.6 °F (36.4 °C) 97.4 °F (36.3 °C) 97.4 °F (36.3 °C)   TempSrc: Oral Oral Oral Oral   SpO2: 94% 96% 92% 93%   Weight:         Physical Exam:  General: no apparent distress, conversant  Skin: no rash, ulcers or subcutaneous nodules  Eyes: anicteric sclerae, moist conjunctivae  Head, ears, nose, throat: atraumatic, oropharynx clear with moist mucous membranes  Neck: trachea midline with no thyromegaly  Heart: regular rate and rhythm, no murmurs / rubs / gallops  Lungs: clear bilaterally, normal respiratory effort, no accessory muscle use  Extremities: no edema or cyanosis  Psych: interactive, answering questions appropriately, appropriate affect    Medications:  Reviewed in EMR    Lab Data:  Reviewed in EMR    Imaging:  I independently visualized all relevant chest imaging in PACS and agree with radiology interpretation except where noted.

## 2024-04-03 NOTE — DIETARY NOTE
[facility]   part of [facility]   CLINICAL NUTRITION    Pancho Arellano     Admitting diagnosis:  Hypoxemia [R09.02]  Influenza B [J10.1]    Ht:  6' 1\"  Wt: 111.9 kg (246 lb 11.1 oz).   Body mass index is 32.55 kg/m².  IBW: 83.6 kg    Wt Readings from Last 6 Encounters:   03/29/24 111.9 kg (246 lb 11.1 oz)   03/27/24 107.5 kg (237 lb)   11/22/23 111.6 kg (246 lb)   10/25/23 111.7 kg (246 lb 3.2 oz)   04/26/23 108.9 kg (240 lb)   10/26/22 108 kg (238 lb)        Labs/Meds reviewed  -abx, Prednisone, Oxycodone    Diet:       Procedures    Cardiac diet Cardiac; Is Patient on Accuchecks? No     Percent Meals Eaten (last 3 days)       Date/Time Percent Meals Eaten (%)    03/31/24 1000 50 %    03/31/24 1632 90 %    03/31/24 1940 100 %    04/01/24 1100 100 %    04/03/24 0854 100 %          Pt chart reviewed d/t length of stay. Recorded PO intakes vary:% with 100% most meals.    No significant weight changes noted per EMR hx.  Last BM:4/3. Skin intact.    Plans likely for discharge today.    PMH:occasional tobacco use, CKD3, pseudomyxoma peritonei, gouty arthritis.     Patient is at low nutrition risk at this time.    Please consult if patient status changes or nutrition issues arise.    Sarah Cordon MS, RD, LDN  Clinical Dietitian  Ext:[phone]

## 2024-04-03 NOTE — DISCHARGE INSTRUCTIONS
Going Home Instructions    In this section you will find the tools which will guide you through the first few days after you leave the hospital. Continued use of these tools will help you develop the skills necessary to keep your heart failure under control.       Home Care Instructions Following Heart Failure - the most important things to do every day include:     Weigh yourself  Take your medicines as prescribed  Limit your sodium (salt) and fluid intake  Know when to call your cardiologist, primary doctor, or nurse  Know when to seek emergency care    Things for You to Remember:   1. See your doctor or healthcare provider.  It is important that you attend this appointment to make sure your symptoms are under control.     2. Your recommended sodium intake is 2422-2642 mg daily    3. Limit your fluid intake to no more than 2 liters or 64 ounces per day    4. Some exercise and activity is important to help keep your heart functioning and strong. Unless instructed not to exercise, you may walk at a slow to moderate pace for 10-15 minutes 2-3 days per week to start. Pace your activity to prevent shortness of breath or fatigue. Stop exercise if you develop chest pain, lightheadedness, or significant shortness of breath.       Call Your Cardiologist If:   You gain 2 pounds overnight or 3-4 pounds in 3-5 days  You have more difficulty breathing  You are getting more tired with normal activity  You are more short of breath lying down, or awaken at night short of breath  You have swelling of your feet or legs  You urinate less often during the day and more often at night  You have cramps in your legs  You have blurred vision or see yellowish-green halos around objects of lights    Go to the Emergency Room If:   You have pain or tightness in your chest  You are extremely short of breath  You are coughing up pink-frothy mucus  You are traveling and develop symptoms of worsening heart failure

## 2024-04-03 NOTE — PROGRESS NOTES
04/03/24 1100   Mobility   O2 walk? Yes   SPO2% on Room Air at Rest 92   SPO2% Ambulation on Room Air 88

## 2024-04-17 ENCOUNTER — HOSPITAL ENCOUNTER (OUTPATIENT)
Dept: CT IMAGING | Facility: HOSPITAL | Age: 59
Discharge: HOME | End: 2024-04-17
Payer: MEDICARE

## 2024-04-17 ENCOUNTER — LAB ENCOUNTER (OUTPATIENT)
Dept: LAB | Facility: HOSPITAL | Age: 59
End: 2024-04-17
Payer: MEDICARE

## 2024-04-17 DIAGNOSIS — C17.9 MALIGNANT NEOPLASM OF SMALL INTESTINE, UNSPECIFIED (HCC): ICD-10-CM

## 2024-04-17 DIAGNOSIS — M1A.09X0 CHRONIC GOUT OF MULTIPLE SITES: ICD-10-CM

## 2024-04-17 DIAGNOSIS — Z87.39 HISTORY OF GOUT: Primary | ICD-10-CM

## 2024-04-17 DIAGNOSIS — C78.6 PSEUDOMYXOMA PERITONEI (HCC): ICD-10-CM

## 2024-04-17 DIAGNOSIS — C48.2 MALIGNANT NEOPLASM OF PERITONEUM, UNSPECIFIED (HCC): ICD-10-CM

## 2024-04-17 DIAGNOSIS — M1A.9XX1 TOPHI: ICD-10-CM

## 2024-04-17 DIAGNOSIS — D37.6 NEOPLASM OF UNCERTAIN BEHAVIOR OF LIVER, GALLBLADDER AND BILE DUCTS: ICD-10-CM

## 2024-04-17 LAB
ALBUMIN SERPL-MCNC: 3.2 G/DL (ref 3.4–5)
ALP LIVER SERPL-CCNC: 74 U/L
ALT SERPL-CCNC: 28 U/L
AST SERPL-CCNC: 11 U/L (ref 15–37)
BASOPHILS # BLD AUTO: 0.03 X10(3) UL (ref 0–0.2)
BASOPHILS NFR BLD AUTO: 0.3 %
BILIRUB DIRECT SERPL-MCNC: 0.2 MG/DL (ref 0–0.2)
BILIRUB SERPL-MCNC: 0.5 MG/DL (ref 0.1–2)
BUN BLD-MCNC: 13 MG/DL (ref 9–23)
CANCER AG125 SERPL-ACNC: 4.6 U/ML (ref ?–35)
CANCER AG19-9 SERPL-ACNC: 13.6 U/ML (ref ?–37)
CEA SERPL-MCNC: 4 NG/ML (ref ?–5)
CREAT BLD-MCNC: 1.73 MG/DL
CRP SERPL-MCNC: 1.09 MG/DL (ref ?–0.3)
EGFRCR SERPLBLD CKD-EPI 2021: 45 ML/MIN/1.73M2 (ref 60–?)
EOSINOPHIL # BLD AUTO: 0.23 X10(3) UL (ref 0–0.7)
EOSINOPHIL NFR BLD AUTO: 2.5 %
ERYTHROCYTE [DISTWIDTH] IN BLOOD BY AUTOMATED COUNT: 12.2 %
ERYTHROCYTE [SEDIMENTATION RATE] IN BLOOD: 78 MM/HR
HCT VFR BLD AUTO: 44.2 %
HGB BLD-MCNC: 14.1 G/DL
IMM GRANULOCYTES # BLD AUTO: 0.05 X10(3) UL (ref 0–1)
IMM GRANULOCYTES NFR BLD: 0.5 %
LYMPHOCYTES # BLD AUTO: 2.06 X10(3) UL (ref 1–4)
LYMPHOCYTES NFR BLD AUTO: 22.2 %
MCH RBC QN AUTO: 31.9 PG (ref 26–34)
MCHC RBC AUTO-ENTMCNC: 31.9 G/DL (ref 31–37)
MCV RBC AUTO: 100 FL
MONOCYTES # BLD AUTO: 0.78 X10(3) UL (ref 0.1–1)
MONOCYTES NFR BLD AUTO: 8.4 %
NEUTROPHILS # BLD AUTO: 6.13 X10 (3) UL (ref 1.5–7.7)
NEUTROPHILS # BLD AUTO: 6.13 X10(3) UL (ref 1.5–7.7)
NEUTROPHILS NFR BLD AUTO: 66.1 %
PLATELET # BLD AUTO: 337 10(3)UL (ref 150–450)
PROT SERPL-MCNC: 7.3 G/DL (ref 6.4–8.2)
RBC # BLD AUTO: 4.42 X10(6)UL
URATE SERPL-MCNC: 4.7 MG/DL
WBC # BLD AUTO: 9.3 X10(3) UL (ref 4–11)

## 2024-04-17 PROCEDURE — 74177 CT ABD & PELVIS W/CONTRAST: CPT

## 2024-04-17 PROCEDURE — 85025 COMPLETE CBC W/AUTO DIFF WBC: CPT

## 2024-04-17 PROCEDURE — 82565 ASSAY OF CREATININE: CPT

## 2024-04-17 PROCEDURE — 84520 ASSAY OF UREA NITROGEN: CPT

## 2024-04-17 PROCEDURE — 85652 RBC SED RATE AUTOMATED: CPT

## 2024-04-17 PROCEDURE — 84550 ASSAY OF BLOOD/URIC ACID: CPT

## 2024-04-17 PROCEDURE — 82378 CARCINOEMBRYONIC ANTIGEN: CPT

## 2024-04-17 PROCEDURE — 86140 C-REACTIVE PROTEIN: CPT

## 2024-04-17 PROCEDURE — 86301 IMMUNOASSAY TUMOR CA 19-9: CPT

## 2024-04-17 PROCEDURE — 36415 COLL VENOUS BLD VENIPUNCTURE: CPT

## 2024-04-17 PROCEDURE — 80076 HEPATIC FUNCTION PANEL: CPT

## 2024-04-17 PROCEDURE — 86304 IMMUNOASSAY TUMOR CA 125: CPT

## 2024-04-23 ENCOUNTER — TELEPHONE (OUTPATIENT)
Dept: INTERNAL MEDICINE | Age: 59
End: 2024-04-23

## 2024-04-23 NOTE — PROGRESS NOTES
[facility]        Patient Name:  Pancho Arellano   YOB: 1965   Gender:  Male   Appt Date:  4/24/2024   Provider:  Yonas Tapia MD     PATIENT PROVIDERS  Primary Care Provider:Jose Mills MD   Address: [address] [place] [address]   Phone #: [phone]       CHIEF COMPLAINT  Chief Complaint   Patient presents with    Follow - Up        PROBLEMS  Reviewed   Patient Active Problem List   Diagnosis    Opioid abuse (HCC)    Gouty arthritis    Pseudomyxoma peritonei (HCC)    Colon polyp    Lumbosacral spondylosis without myelopathy    Degeneration of lumbar or lumbosacral intervertebral disc    Osteoarthrosis, hip    Lumbar spondylosis    Gout    Calculus of kidney    Adiposity    Current smoker    Neuropathic pain    Gastroesophageal reflux disease    Personal history of colonic polyps    Benign neoplasm of sigmoid colon    Hyponatremia    Acute kidney injury (HCC)    Metabolic alkalosis    Hyperglycemia    Influenza B    Hypoxemia    Acute respiratory failure with hypoxia (HCC)        History of Present Illness:  Pseudomyxoma Peritonei   10/2013: had CT for kidney stones and 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 Biopsy was consistent with mucinous neoplasm. Review at [facility] suggested low grade mucinous neoplasm. CEA was 30.2;  was 76.  1/11/2013: CRS/HIPEC with Mitomycin C (Dr Cedillo) to include peritonectomy, right colectomy, omentectomy. PCI was 18; cc0/cc1 achived. Post op CEA was 2.0  10/2014: rise in CEA and nodule progression.  11/23/2014: redo CRS/HIPEC with [facility]. PCI 15; cc1/2 achieved. There were significant dense adhesions of the right liver lobe, right diaphragm, and zain hepatis with disease densely adherent to hepatic artery.  He has been under active surveillance.  5/12/2015: CT--->Index subcapsular focus along posterior margin of right lobe 1 cm by 0.6 cm. Previously measured 1.1 X 0.8 cm. Additional implant along the  posterior margin of the liver right lobe 1.5 x 0.9 cm (previously 1.8 x 1.3 cm). Several small gastrohepatic and portacaval nodes unchanged or smaller.  5/12/2015: CEA---> 3.6  11/19/2015: MRI--->Stable serosal T2 hyperintense lesion on the medial aspect of right lobe of the liver measuring 20 x 20 mm. This lesion demonstrates restricted diffusion and rim enhancement. This also stable lesion on the right posterior liver near the bare area measuring 10 x 11 mm. There is also a stable tiny lesion just superiorly along the posterior bare area of the liver measuring 9 x 4 mm. CEA 2.4; CA19.9   7.6;   2.1  4/28/2016: MRI with stable findings. CEA 2.8, CA 19.9 14.1,  3.4.    Patient had an allergic reaction to the MRI contrast of Magnevist.  10/5/16: CT NO RECURRENCE OR METS  10/5/16: CEA 2; CA19.9  8.9;   3.7  4/11/2017 CT: Enlarging low attenuation cystic structure in the left upper quadrant abutting the gastric antrum measuring 3.8 x 3.2 cm. No significant change and low attenuation subcapsular mass along the posterior margin right lobe of the liver measuring 11 x 6 mm previously 11 x 5 mm. Stable low-attenuation cystic mass posterior medial margin right lobe liver measuring 2.0 x 1.9 cm.  4/11/2017: CEA  3.3;  CA19.9  13.3;    3.4  10/10/17: CEA  4.9;  CA19.9  23.5;    2.6  10/10/2017 CT: There is a now bilobed hypodense lesion along the inferior aspect of the body of the stomach which is worse since 8/3/17. This lesion measures 6.6 x 4.6 cm whereas on 8/3/17, this measured 4.4 x 3.5 cm. There is a nonspecific 9 mm hypodensity along the posterior margin of the right hepatic lobe which previously measured 7 mm   1/5/17: CEA 3.8  1/5/17: Bilobed low-density lesion adjacent to the greater curvature of the stomach is stable.  Exophytic low-density lesions adjacent to the right hepatic lobe are stable.  Mildly prominent zain hepatis lymph node is stable.  Multiple nonenlarged   retroperitoneal  lymph nodes are stable.  No significant change since prior exam.  Nonobstructing calculi in both kidneys are stable.  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  Small lesions along the right lobe of the liver are stable.  7/10/18: CEA: 3.8, CA 19-9: 11.5, CA-125: 3.2  7/10/18: CT abdomen/pelvis: Minimal increase in size of bilobed cystic structure along the greater curvature of the stomach likely  related to pseudomyxoma peritonei.  Small lesions along the right lobe of the liver are stable.CT 7/13/2019à.  There is increasing size of a bilobed fluid collection along the greater curvature of the stomach now measuring approximately 9.1 x 7.2 cm, previously 8.3 x 4.5 cm that is concerning for worsening changes of pseudomyxoma peritonei.  CEA-à 4.1; CA 19.9-à 11.5; -à 3.8  CT 10/9/19: Worsening 10.8 cm region of probable pseudomyxoma peritonei within the left upper quadrant of the abdomen along the inferior aspect of the stomach and subjacent to the left diaphragm.  The patient underwent cytoreductive surgery to include partial hepatectomy, partial resection left diaphragm, small-bowel resection, and hyperthermic intraperitoneal with mitomycin C on 11/19/19. PCI 6, CC 0.      Admitted 12/25/2020 for small bowel obstruction. Resolved with conservative management.    Interval History:  Admitted 3/27/2024 for Influenza B. During admission, patient states he possibly had evidence of an old MI, with 30-35% blockage. He is seeing Dr Martin from [facility] on April 30th. He saw his pulmonologist, Dr Pat earlier this week. He denies nausea or vomiting. He has minor abdominal soreness and he feels very low energy since his admission.      Vital Signs:  /68 (BP Location: Left arm, Patient Position: Sitting, Cuff Size: large)   Pulse 72   Temp 98.2 °F (36.8 °C) (Temporal)   Resp 20    Wt 110.3 kg (243 lb 3.2 oz)   BMI 32.09 kg/m²      Medications Reviewed:    Current Outpatient Medications:     benzonatate 200 MG Oral Cap, Take 1 capsule (200 mg total) by mouth 3 (three) times daily as needed for cough., Disp: 15 capsule, Rfl: 0    fluticasone-umeclidin-vilant 200-62.5-25 MCG/ACT Inhalation Aerosol Powder, Breath Activated, Inhale 1 puff into the lungs daily., Disp: 1 each, Rfl: 0    metoprolol succinate ER 25 MG Oral Tablet 24 Hr, Take 0.5 tablets (12.5 mg total) by mouth Daily Beta Blocker., Disp: 60 tablet, Rfl: 1    losartan 25 MG Oral Tab, Take 1 tablet (25 mg total) by mouth daily., Disp: 30 tablet, Rfl: 1    FEBUXOSTAT 80 MG Oral Tab, TAKE 1 TABLET BY MOUTH DAILY, Disp: 90 tablet, Rfl: 1    oxyCODONE HCl ER 40 MG Oral Tablet Extended Release 12 hour Abuse-Deterrent, Take 1 tablet (40 mg total) by mouth Q12H., Disp: , Rfl:     acetaminophen 500 MG Oral Tab, Take 2 tablets (1,000 mg total) by mouth every 6 (six) hours as needed for Pain., Disp: , Rfl:      Allergies Reviewed:  Allergies   Allergen Reactions    Magnevist [Gadopentetate Dimeglumine] ANGIOEDEMA     MRI CONTRAST ALLERGY  1/2/18: pt states he got significant HA w/contrast injection, requiring hydration and hospitalization in past. NO problem w/CT contrast dye. CKRN     Morphine NAUSEA ONLY        History:  Reviewed:  Past Medical History:    Back problem    nerve pain.  Left side rib cage    Calculus of kidney    Cancer (HCC)        Depression    20 yrs ago    GERD    GOUT    Pseudomyxoma peritonei (HCC)    Visual impairment    glasses      Reviewed:  Past Surgical History:   Procedure Laterality Date    Colonoscopy      Colonoscopy & polypectomy  11/12    polyps; no cancer; repeat 3 yrs    Colonoscopy & polypectomy  1/16    polyp; repeat 5 yrs (hyperplastic but hx of adenoma)    Colonoscopy,biopsy N/A 1/25/2016    Procedure: COLONOSCOPY, POSSIBLE BIOPSY, POSSIBLE POLYPECTOMY 18049;  Surgeon: Awais Scott MD;   Location: [facility]    Colonoscopy,remv lesn,snare  11/5/2012    Procedure: ESOPHAGOGASTRODUODENOSCOPY, COLONOSCOPY, POSSIBLE BIOPSY, POSSIBLE POLYPECTOMY 37602,17430;  Surgeon: Awais Scott MD;  Location: [facility]    Colonoscpy, flexible, proximal to splenic flexure; w/directed submucosa injection(s), any substance  11/5/2012    Procedure: ESOPHAGOGASTRODUODENOSCOPY, COLONOSCOPY, POSSIBLE BIOPSY, POSSIBLE POLYPECTOMY 58294,37739;  Surgeon: Awais Scott MD;  Location: [facility]    Cystoscopy,remv calculus,simple  11/20/2012    Procedure: CYSTOSCOPY WITH REMOVAL OF STENT;  Surgeon: Gerard Taylor MD;  Location: [facility]    Fluor gid & loclzj ndl/cath spi dx/ther njx  3/27/2014    Procedure: LUMBAR EPIDURAL;  Surgeon: Geovanny Ricci MD;  Location: [facility]    Fluor gid & loclzj ndl/cath spi dx/ther njx  4/15/2014    Procedure: LUMBAR EPIDURAL;  Surgeon: Geovanny Ricci MD;  Location: [facility]    Fluor gid & loclzj ndl/cath spi dx/ther njx  4/29/2014    Procedure: LUMBAR EPIDURAL;  Surgeon: Geovanny Ricci MD;  Location: [facility]    Fragmenting of kidney stone  11/20/2012    Procedure: LITHOTRIPSY WITH CYSTOSCOPY, STENT PLACEMENT;  Surgeon: Gerard Taylor MD;  Location: [facility]    Gastro - dmg  11/12    normal    Injection, w/wo contrast, dx/therapeutic substance, epidural/subarachnoid; lumbar/sacral  3/27/2014    Procedure: LUMBAR EPIDURAL;  Surgeon: Geovanny Ricci MD;  Location: [facility]    Injection, w/wo contrast, dx/therapeutic substance, epidural/subarachnoid; lumbar/sacral  4/15/2014    Procedure: LUMBAR EPIDURAL;  Surgeon: Geovanny Ricci MD;  Location: [facility]    Injection, w/wo contrast, dx/therapeutic substance, epidural/subarachnoid; lumbar/sacral  4/29/2014    Procedure: LUMBAR EPIDURAL;  Surgeon:  Geovanny Ricci MD;  Location: [facility]    Ir ureter tube removal via ileal conduit      Lithotripsy  11/20/12    Other surgical history      bilat knee scopes/multiple each knee    Other surgical history      multiple ankle procedures/skin surgeries    Other surgical history  10-24-12    sx-[facility]-Left ureteral stone, left pyelonephritisi-Dr. Taylor    Other surgical history  1/2013     Pseuodmyxoma Peritoneii stage IV s/p DAISY, omenentectomy, right hemicolectomy, and intraperitoneal chemotherapy 01/2013    Other surgical history  07/22/13    Cystoscopy with Stent Removal - Dr. Taylor    Other surgical history  11/23/2014    Exp lap, extensive cytoreduction of abdomina tumor with peritoneal stripping, partial greater omentectomy,  HIPEC with mitomycin C     Other surgical history  11/19/2019    Cytoreductive surgery, Partial hepatectomy, Partial resection of left diaphragm, Small-bowel resection, Hyperthermic intraperitoneal chemotherapy with mitomycin C    Patient documented not to have experienced any of the following events  11/20/2012    Procedure: CYSTOSCOPY WITH REMOVAL OF STENT;  Surgeon: Gerard Taylor MD;  Location: [facility]    Patient documented not to have experienced any of the following events  11/20/2012    Procedure: CYSTOSCOPY WITH REMOVAL OF STENT;  Surgeon: Gerard Taylor MD;  Location: [facility]    Patient documented not to have experienced any of the following events  3/27/2014    Procedure: LUMBAR EPIDURAL;  Surgeon: Geovanny Ricci MD;  Location: [facility]    Patient documented not to have experienced any of the following events  4/15/2014    Procedure: LUMBAR EPIDURAL;  Surgeon: Geovanny Ricci MD;  Location: [facility]    Patient documented not to have experienced any of the following events  4/29/2014    Procedure: LUMBAR EPIDURAL;  Surgeon: Geovanny Ricci MD;  Location: [facility]     Patient documented not to have experienced any of the following events N/A 1/25/2016    Procedure: COLONOSCOPY, POSSIBLE BIOPSY, POSSIBLE POLYPECTOMY 25701;  Surgeon: Awais Scott MD;  Location: [facility]    Patient with preoperative order for iv antibiotic surgical site infect  11/20/2012    Procedure: CYSTOSCOPY WITH REMOVAL OF STENT;  Surgeon: Gerard Taylor MD;  Location: [facility]    Patient withough preoperative order for iv antibiotic surgical site infection prophylaxis.  11/5/2012    Procedure: ESOPHAGOGASTRODUODENOSCOPY, COLONOSCOPY, POSSIBLE BIOPSY, POSSIBLE POLYPECTOMY 65736,20310;  Surgeon: Awais Scott MD;  Location: [facility]    Patient withough preoperative order for iv antibiotic surgical site infection prophylaxis.  3/27/2014    Procedure: LUMBAR EPIDURAL;  Surgeon: Geovanny Ricci MD;  Location: [facility]    Patient withough preoperative order for iv antibiotic surgical site infection prophylaxis.  4/15/2014    Procedure: LUMBAR EPIDURAL;  Surgeon: Geovanny Ricci MD;  Location: [facility]    Patient withough preoperative order for iv antibiotic surgical site infection prophylaxis.  4/29/2014    Procedure: LUMBAR EPIDURAL;  Surgeon: Geovanny Ricci MD;  Location: [facility]    Patient withough preoperative order for iv antibiotic surgical site infection prophylaxis. N/A 1/25/2016    Procedure: COLONOSCOPY, POSSIBLE BIOPSY, POSSIBLE POLYPECTOMY 68638;  Surgeon: Awais Scott MD;  Location: [facility]    Upper gi endoscopy,diagnosis  11/5/2012    Procedure: ESOPHAGOGASTRODUODENOSCOPY, COLONOSCOPY, POSSIBLE BIOPSY, POSSIBLE POLYPECTOMY 61903,69762;  Surgeon: Awais Scott MD;  Location: [facility]      Reviewed Social History:  Social History     Socioeconomic History    Marital status:    Tobacco Use    Smoking status: Former      Current packs/day: 0.00     Average packs/day: 1.5 packs/day for 16.0 years (24.0 ttl pk-yrs)     Types: Cigarettes, Cigars     Start date: 2000     Quit date: 2016     Years since quittin.3    Smokeless tobacco: Never    Tobacco comments:     quit Dec 2016.  1 cigar per day   Vaping Use    Vaping status: Never Used   Substance and Sexual Activity    Alcohol use: No     Alcohol/week: 0.0 standard drinks of alcohol    Drug use: No   Other Topics Concern    Caffeine Concern Yes     Comment: Mountain Dew     Social Determinants of Health     Food Insecurity: No Food Insecurity (3/27/2024)    Food Insecurity     Food Insecurity: Never true   Transportation Needs: No Transportation Needs (3/27/2024)    Transportation Needs     Lack of Transportation: No   Physical Activity: Sufficiently Active (2020)    Received from [facility]    Exercise Vital Sign     Days of Exercise per Week: 5 days     Minutes of Exercise per Session: 40 min   Housing Stability: Low Risk  (3/27/2024)    Housing Stability     Housing Instability: No      Reviewed:  Family History   Problem Relation Age of Onset    Cancer Maternal Grandmother     Diabetes Father     Diabetes Mother     Diabetes Brother       Review of Systems:  GENERAL HEALTH: feels well, + fatigue.   SKIN: no change in mole.   HEENT: denies pain  RESPIRATORY: Some shortness of breath from pneumonia  CARDIOVASCULAR: denies chest pain, SOB, edema,orthopnea, no palpitations   GI: denies nausea, vomiting, constipation, diarrhea; no rectal bleeding  GENITAL/: no blood in urine  MUSCULOSKELETAL: + right hip pain  NEURO: no tingling, numbness, weakness  ENDOCRINE: denies weight loss/gain  PSYCH: no mood changes       Physical Examination:  Constitutional: General Appearance: healthy-appearing, well-nourished, and well-developed. Level of Distress: NAD.   Eyes: Sclera: non-icteric.   Neck: Neck: supple.   Lymph Nodes: Lymph Nodes no  cervical LAD, supraclavicular LAD, axillary LAD, or inguinal LAD.   Lungs: Auscultation: breath sounds normal.   Cardiovascular: Heart Auscultation: RRR.   Abdomen: Inspection and Palpation: no masses, tenderness (no guarding, no rebound), or CVA tenderness and soft and non-distended. Liver: no hepatomegaly. Spleen: no splenomegaly. Hernia: none palpable.   Musculoskeletal: Extremities: no edema.   Skin: Inspection and palpation: no jaundice.      Document Review:  CT A/P 4/17/2024:  1. No CT evidence of active pseudomyxoma peritonei.  There is however a stable, hypoattenuating focus along the serosal surface of the distal stomach, unchanged from prior imaging.   2. No acute intra-abdominal or pelvic process.   3. Please see the body of the report above for further, less significant details.     CEA 4/17/2024: 4.0  CEA 10/20/2023: 2.9     4/17/2024: 4.6   10/20/2023: 2.3    CA 19-9 4/17/2024: 13.6  CA 19-9 10/20/2023:8.9     Procedure(s):  None     Assessment / Plan:  Pseudomyxoma peritonei (HCC) (C78.6)  Doing well clinically and radiographically.  Continue surveillance.  Return to clinic 6 months with MRI and tumor markers.  If unremarkable, will proceed with 1 year follow-up.    [facility]  Has follow-up with cardiology and pulmonary medicine.      Follow Up:  6 mo       Electronically Signed by: Yonas Tapia MD

## 2024-04-24 ENCOUNTER — APPOINTMENT (OUTPATIENT)
Dept: INTERNAL MEDICINE | Age: 59
End: 2024-04-24

## 2024-04-24 ENCOUNTER — OFFICE VISIT (OUTPATIENT)
Dept: SURGERY | Facility: CLINIC | Age: 59
End: 2024-04-24
Payer: MEDICARE

## 2024-04-24 VITALS
DIASTOLIC BLOOD PRESSURE: 80 MMHG | SYSTOLIC BLOOD PRESSURE: 127 MMHG | RESPIRATION RATE: 16 BRPM | HEART RATE: 79 BPM | WEIGHT: 242.29 LBS | HEIGHT: 72 IN | OXYGEN SATURATION: 95 % | TEMPERATURE: 98.1 F | BODY MASS INDEX: 32.82 KG/M2

## 2024-04-24 VITALS
DIASTOLIC BLOOD PRESSURE: 68 MMHG | RESPIRATION RATE: 20 BRPM | HEART RATE: 72 BPM | BODY MASS INDEX: 32 KG/M2 | WEIGHT: 243.19 LBS | SYSTOLIC BLOOD PRESSURE: 125 MMHG | TEMPERATURE: 98 F

## 2024-04-24 DIAGNOSIS — M54.16 LUMBAR RADICULAR SYNDROME: ICD-10-CM

## 2024-04-24 DIAGNOSIS — R97.8 OTHER ABNORMAL TUMOR MARKERS: ICD-10-CM

## 2024-04-24 DIAGNOSIS — C78.6 PSEUDOMYXOMA PERITONEI (HCC): Primary | ICD-10-CM

## 2024-04-24 DIAGNOSIS — I21.9 MYOCARDIAL INFARCTION, UNSPECIFIED MI TYPE, UNSPECIFIED ARTERY (HCC): ICD-10-CM

## 2024-04-24 DIAGNOSIS — J96.01 ACUTE RESPIRATORY FAILURE WITH HYPOXIA (HCC): ICD-10-CM

## 2024-04-24 DIAGNOSIS — J11.1 INFLUENZA: ICD-10-CM

## 2024-04-24 DIAGNOSIS — C78.6 MALIGNANT PSEUDOMYXOMA PERITONEI (CMD): Primary | ICD-10-CM

## 2024-04-24 DIAGNOSIS — I42.9 CARDIOMYOPATHY, UNSPECIFIED TYPE (CMD): ICD-10-CM

## 2024-04-24 DIAGNOSIS — M25.551 ACUTE PAIN OF RIGHT HIP: ICD-10-CM

## 2024-04-24 PROCEDURE — 99213 OFFICE O/P EST LOW 20 MIN: CPT | Performed by: SURGERY

## 2024-04-24 RX ORDER — LOSARTAN POTASSIUM 25 MG/1
1 TABLET ORAL DAILY
COMMUNITY
Start: 2024-03-30

## 2024-04-24 RX ORDER — METOPROLOL SUCCINATE 25 MG/1
12.5 TABLET, EXTENDED RELEASE ORAL DAILY
COMMUNITY
Start: 2024-03-30

## 2024-04-24 ASSESSMENT — ENCOUNTER SYMPTOMS
EYES NEGATIVE: 1
CONSTITUTIONAL NEGATIVE: 1
GASTROINTESTINAL NEGATIVE: 1
NEUROLOGICAL NEGATIVE: 1
ENDOCRINE NEGATIVE: 1
PSYCHIATRIC NEGATIVE: 1
HEMATOLOGIC/LYMPHATIC NEGATIVE: 1
RESPIRATORY NEGATIVE: 1
ALLERGIC/IMMUNOLOGIC NEGATIVE: 1

## 2024-04-24 ASSESSMENT — PATIENT HEALTH QUESTIONNAIRE - PHQ9
CLINICAL INTERPRETATION OF PHQ2 SCORE: NO FURTHER SCREENING NEEDED
SUM OF ALL RESPONSES TO PHQ9 QUESTIONS 1 AND 2: 0
2. FEELING DOWN, DEPRESSED OR HOPELESS: NOT AT ALL
SUM OF ALL RESPONSES TO PHQ9 QUESTIONS 1 AND 2: 0
1. LITTLE INTEREST OR PLEASURE IN DOING THINGS: NOT AT ALL

## 2024-04-24 ASSESSMENT — PAIN SCALES - GENERAL: PAINLEVEL: 1

## 2024-04-30 NOTE — H&P
Please see dictated office note scanned into the EMR.    Patient is here for angiography.  He was incidentally noted to have an ejection fraction of 30 to 35% on CTA suggest potential occlusion of his LAD.  He is here for angiography.  Case was discussed in detail with the patient and his sister at bedside.    Risks, benefits, and alternatives of cardiac cath/PCI discussed in detail.  Risks include but not limited to death, MI, CVA, emergency CABG, transfusion, and surgical repair of vascular complications.  Possibility of a staged procedure also discussed with patient.    Questions answered.  Patient agrees to proceed.    Yusef Lozano MD

## 2024-05-01 RX ORDER — NALOXONE HYDROCHLORIDE 4 MG/.1ML
SPRAY NASAL
Qty: 1 EACH | Refills: 3 | Status: SHIPPED | OUTPATIENT
Start: 2024-05-01

## 2024-05-02 ENCOUNTER — LAB ENCOUNTER (OUTPATIENT)
Dept: LAB | Facility: HOSPITAL | Age: 59
End: 2024-05-02
Attending: INTERNAL MEDICINE
Payer: MEDICARE

## 2024-05-02 DIAGNOSIS — I42.0 CONGESTIVE CARDIOMYOPATHY (HCC): ICD-10-CM

## 2024-05-02 DIAGNOSIS — I25.10 CAD (CORONARY ARTERY DISEASE), NATIVE CORONARY ARTERY: ICD-10-CM

## 2024-05-02 DIAGNOSIS — E78.1 PURE HYPERGLYCERIDEMIA: ICD-10-CM

## 2024-05-02 DIAGNOSIS — I50.23 HEART FAILURE, SYSTOLIC, ACUTE ON CHRONIC (HCC): Primary | ICD-10-CM

## 2024-05-02 DIAGNOSIS — R73.9 HYPERGLYCEMIA: ICD-10-CM

## 2024-05-02 LAB
ALBUMIN SERPL-MCNC: 3.7 G/DL (ref 3.4–5)
ALBUMIN/GLOB SERPL: 0.9 {RATIO} (ref 1–2)
ALP LIVER SERPL-CCNC: 82 U/L
ALT SERPL-CCNC: 27 U/L
ANION GAP SERPL CALC-SCNC: 4 MMOL/L (ref 0–18)
AST SERPL-CCNC: 15 U/L (ref 15–37)
BASOPHILS # BLD AUTO: 0.06 X10(3) UL (ref 0–0.2)
BASOPHILS NFR BLD AUTO: 0.6 %
BILIRUB SERPL-MCNC: 0.5 MG/DL (ref 0.1–2)
BUN BLD-MCNC: 12 MG/DL (ref 9–23)
CALCIUM BLD-MCNC: 9.6 MG/DL (ref 8.5–10.1)
CHLORIDE SERPL-SCNC: 106 MMOL/L (ref 98–112)
CO2 SERPL-SCNC: 27 MMOL/L (ref 21–32)
CREAT BLD-MCNC: 1.46 MG/DL
EGFRCR SERPLBLD CKD-EPI 2021: 55 ML/MIN/1.73M2 (ref 60–?)
EOSINOPHIL # BLD AUTO: 0.24 X10(3) UL (ref 0–0.7)
EOSINOPHIL NFR BLD AUTO: 2.3 %
ERYTHROCYTE [DISTWIDTH] IN BLOOD BY AUTOMATED COUNT: 12.6 %
EST. AVERAGE GLUCOSE BLD GHB EST-MCNC: 126 MG/DL (ref 68–126)
FASTING STATUS PATIENT QL REPORTED: YES
GLOBULIN PLAS-MCNC: 3.9 G/DL (ref 2.8–4.4)
GLUCOSE BLD-MCNC: 112 MG/DL (ref 70–99)
HBA1C MFR BLD: 6 % (ref ?–5.7)
HCT VFR BLD AUTO: 45.2 %
HGB BLD-MCNC: 14.9 G/DL
IMM GRANULOCYTES # BLD AUTO: 0.05 X10(3) UL (ref 0–1)
IMM GRANULOCYTES NFR BLD: 0.5 %
LYMPHOCYTES # BLD AUTO: 1.89 X10(3) UL (ref 1–4)
LYMPHOCYTES NFR BLD AUTO: 18.2 %
MCH RBC QN AUTO: 31.6 PG (ref 26–34)
MCHC RBC AUTO-ENTMCNC: 33 G/DL (ref 31–37)
MCV RBC AUTO: 96 FL
MONOCYTES # BLD AUTO: 1.42 X10(3) UL (ref 0.1–1)
MONOCYTES NFR BLD AUTO: 13.7 %
NEUTROPHILS # BLD AUTO: 6.74 X10 (3) UL (ref 1.5–7.7)
NEUTROPHILS # BLD AUTO: 6.74 X10(3) UL (ref 1.5–7.7)
NEUTROPHILS NFR BLD AUTO: 64.7 %
OSMOLALITY SERPL CALC.SUM OF ELEC: 285 MOSM/KG (ref 275–295)
PLATELET # BLD AUTO: 292 10(3)UL (ref 150–450)
POTASSIUM SERPL-SCNC: 4.1 MMOL/L (ref 3.5–5.1)
PROT SERPL-MCNC: 7.6 G/DL (ref 6.4–8.2)
RBC # BLD AUTO: 4.71 X10(6)UL
SODIUM SERPL-SCNC: 137 MMOL/L (ref 136–145)
TSI SER-ACNC: 4.38 MIU/ML (ref 0.36–3.74)
WBC # BLD AUTO: 10.4 X10(3) UL (ref 4–11)

## 2024-05-02 PROCEDURE — 36415 COLL VENOUS BLD VENIPUNCTURE: CPT

## 2024-05-02 PROCEDURE — 84443 ASSAY THYROID STIM HORMONE: CPT

## 2024-05-02 PROCEDURE — 85025 COMPLETE CBC W/AUTO DIFF WBC: CPT

## 2024-05-02 PROCEDURE — 80053 COMPREHEN METABOLIC PANEL: CPT

## 2024-05-02 PROCEDURE — 83036 HEMOGLOBIN GLYCOSYLATED A1C: CPT

## 2024-05-03 RX ORDER — CETIRIZINE HYDROCHLORIDE 10 MG/1
10 TABLET ORAL DAILY
COMMUNITY

## 2024-05-03 RX ORDER — SPIRONOLACTONE 25 MG/1
12.5 TABLET ORAL DAILY
COMMUNITY

## 2024-05-03 NOTE — PAT NURSING NOTE
Per PAT encounter/[employer] message sent to pt:    PreOp Instructions     You are scheduled for: a Cardiac Procedure     Date of Procedure: 05/06/24 Monday     Diet Instructions: Do not eat or drink anything after midnight     Medications: Take Aspirin 81 mg x 4 tablets the day of your procedure OR take Aspirin 325 mg x 1 tablet the day of your procedure (not both); Medications you are allowed to take can be taken with a sip of water the morning of your procedure     Skin Prep: Shower with antibacterial soap using a clean washcloth, prior to procedure     Arrival Time: The Friday prior to your procedure you will receive a phone call before 6:00 pm with your arrival time. If you haven't received a phone call, please check your voicemail messages., If you did not receive a voice mail and it is after 6:00 pm, please call the nursing supervisor at [phone].    Driving After Procedure: If sedation is given, you WILL NOT be able to drive home. You will need a responsible adult  to drive you home., Cannot take uber or cab unless approved by physician     Discharge Teaching: Your nurse will give you specific instructions before discharge, Most people can resume normal activities in 2-3 days, Any questions, please call the physician's office      parking is available starting at 6 am or park in the [place] parking garage at [facility]. Check in at the [facility] reception desk. Our  will be there to check you in for your procedure. Please bring your insurance cards and ID with you.                                                                                                                                      Please DO NOT respond to this message, the inbasket is not monitored for messages. For any questions, please call the physician's office.

## 2024-05-05 DIAGNOSIS — C78.6 MALIGNANT PSEUDOMYXOMA PERITONEI  (CMD): ICD-10-CM

## 2024-05-06 ENCOUNTER — EXTERNAL RECORD (OUTPATIENT)
Dept: HEALTH INFORMATION MANAGEMENT | Facility: OTHER | Age: 59
End: 2024-05-06

## 2024-05-06 ENCOUNTER — HOSPITAL ENCOUNTER (OUTPATIENT)
Dept: INTERVENTIONAL RADIOLOGY/VASCULAR | Facility: HOSPITAL | Age: 59
Discharge: HOME | End: 2024-05-06
Attending: INTERNAL MEDICINE | Admitting: INTERNAL MEDICINE
Payer: MEDICARE

## 2024-05-06 VITALS
TEMPERATURE: 98 F | BODY MASS INDEX: 32.91 KG/M2 | DIASTOLIC BLOOD PRESSURE: 108 MMHG | OXYGEN SATURATION: 95 % | SYSTOLIC BLOOD PRESSURE: 121 MMHG | RESPIRATION RATE: 17 BRPM | HEART RATE: 58 BPM | HEIGHT: 72 IN | WEIGHT: 243 LBS

## 2024-05-06 DIAGNOSIS — R93.89 ABNORMAL COMPUTED TOMOGRAPHY ANGIOGRAPHY (CTA): ICD-10-CM

## 2024-05-06 DIAGNOSIS — I25.10 CAD (CORONARY ARTERY DISEASE), NATIVE CORONARY ARTERY: ICD-10-CM

## 2024-05-06 LAB
ATRIAL RATE: 67 BPM
ISTAT ACTIVATED CLOTTING TIME: 298 SECONDS (ref 74–137)
P AXIS: 54 DEGREES
P-R INTERVAL: 166 MS
Q-T INTERVAL: 404 MS
QRS DURATION: 98 MS
QTC CALCULATION (BEZET): 426 MS
R AXIS: -60 DEGREES
T AXIS: 89 DEGREES
VENTRICULAR RATE: 67 BPM

## 2024-05-06 PROCEDURE — 99211 OFF/OP EST MAY X REQ PHY/QHP: CPT

## 2024-05-06 PROCEDURE — 93005 ELECTROCARDIOGRAM TRACING: CPT

## 2024-05-06 PROCEDURE — 99152 MOD SED SAME PHYS/QHP 5/>YRS: CPT | Performed by: INTERNAL MEDICINE

## 2024-05-06 PROCEDURE — 93010 ELECTROCARDIOGRAM REPORT: CPT | Performed by: INTERNAL MEDICINE

## 2024-05-06 PROCEDURE — B240ZZ3 ULTRASONOGRAPHY OF SINGLE CORONARY ARTERY, INTRAVASCULAR: ICD-10-PCS | Performed by: INTERNAL MEDICINE

## 2024-05-06 PROCEDURE — B215YZZ FLUOROSCOPY OF LEFT HEART USING OTHER CONTRAST: ICD-10-PCS | Performed by: INTERNAL MEDICINE

## 2024-05-06 PROCEDURE — 99153 MOD SED SAME PHYS/QHP EA: CPT | Performed by: INTERNAL MEDICINE

## 2024-05-06 PROCEDURE — B211YZZ FLUOROSCOPY OF MULTIPLE CORONARY ARTERIES USING OTHER CONTRAST: ICD-10-PCS | Performed by: INTERNAL MEDICINE

## 2024-05-06 PROCEDURE — 92978 ENDOLUMINL IVUS OCT C 1ST: CPT | Performed by: INTERNAL MEDICINE

## 2024-05-06 PROCEDURE — 93458 L HRT ARTERY/VENTRICLE ANGIO: CPT | Performed by: INTERNAL MEDICINE

## 2024-05-06 PROCEDURE — 4A023N7 MEASUREMENT OF CARDIAC SAMPLING AND PRESSURE, LEFT HEART, PERCUTANEOUS APPROACH: ICD-10-PCS | Performed by: INTERNAL MEDICINE

## 2024-05-06 PROCEDURE — 85347 COAGULATION TIME ACTIVATED: CPT

## 2024-05-06 PROCEDURE — 027035Z DILATION OF CORONARY ARTERY, ONE ARTERY WITH TWO DRUG-ELUTING INTRALUMINAL DEVICES, PERCUTANEOUS APPROACH: ICD-10-PCS | Performed by: INTERNAL MEDICINE

## 2024-05-06 RX ORDER — ASPIRIN 81 MG/1
81 TABLET ORAL DAILY
Status: DISCONTINUED | OUTPATIENT
Start: 2024-05-07 | End: 2024-05-06

## 2024-05-06 RX ORDER — SODIUM CHLORIDE 9 MG/ML
INJECTION, SOLUTION INTRAVENOUS
Status: DISCONTINUED | OUTPATIENT
Start: 2024-05-07 | End: 2024-05-06 | Stop reason: HOSPADM

## 2024-05-06 RX ORDER — SODIUM CHLORIDE 9 MG/ML
INJECTION, SOLUTION INTRAVENOUS CONTINUOUS
Status: DISCONTINUED | OUTPATIENT
Start: 2024-05-06 | End: 2024-05-06

## 2024-05-06 RX ORDER — HEPARIN SODIUM 5000 [USP'U]/ML
INJECTION, SOLUTION INTRAVENOUS; SUBCUTANEOUS
Status: COMPLETED
Start: 2024-05-06 | End: 2024-05-06

## 2024-05-06 RX ORDER — MIDAZOLAM HYDROCHLORIDE 1 MG/ML
INJECTION INTRAMUSCULAR; INTRAVENOUS
Status: COMPLETED
Start: 2024-05-06 | End: 2024-05-06

## 2024-05-06 RX ORDER — VERAPAMIL HYDROCHLORIDE 2.5 MG/ML
INJECTION, SOLUTION INTRAVENOUS
Status: COMPLETED
Start: 2024-05-06 | End: 2024-05-06

## 2024-05-06 RX ORDER — NITROGLYCERIN 20 MG/100ML
INJECTION INTRAVENOUS
Status: COMPLETED
Start: 2024-05-06 | End: 2024-05-06

## 2024-05-06 RX ORDER — OXYCODONE HCL 40 MG/1
40 TABLET, FILM COATED, EXTENDED RELEASE ORAL EVERY 12 HOURS SCHEDULED
Qty: 60 TABLET | Refills: 0 | Status: SHIPPED | OUTPATIENT
Start: 2024-05-06 | End: 2024-06-05

## 2024-05-06 RX ORDER — IODIXANOL 320 MG/ML
100 INJECTION, SOLUTION INTRAVASCULAR
Status: COMPLETED | OUTPATIENT
Start: 2024-05-06 | End: 2024-05-06

## 2024-05-06 RX ORDER — LIDOCAINE HYDROCHLORIDE 10 MG/ML
INJECTION, SOLUTION EPIDURAL; INFILTRATION; INTRACAUDAL; PERINEURAL
Status: COMPLETED
Start: 2024-05-06 | End: 2024-05-06

## 2024-05-06 RX ORDER — ASPIRIN 81 MG/1
81 TABLET ORAL DAILY
Qty: 30 TABLET | Refills: 0 | Status: SHIPPED | OUTPATIENT
Start: 2024-05-07

## 2024-05-06 RX ADMIN — IODIXANOL 240 ML: 320 INJECTION, SOLUTION INTRAVASCULAR at 09:02:00

## 2024-05-06 NOTE — PROCEDURES
[facility]    PATIENT'S NAME: JADON SERRANO   ATTENDING PHYSICIAN: Tha Martin MD   OPERATING PHYSICIAN: Yusef Lozano M.D.   PATIENT ACCOUNT#:   [id number]    LOCATION:  [address]  MEDICAL RECORD #:   [id number]       YOB: 1965  ADMISSION DATE:       05/06/2024      OPERATION DATE:  05/06/2024    CARDIAC PROCEDURE TRANSCRIPTION    CARDIAC CATHETERIZATION/PERCUTANEOUS CORONARY INTERVENTION     PREOPERATIVE DIAGNOSIS:    1.   Left ventricular dysfunction.  2.   Abnormal CTA.    3.   History of peritoneal carcinoma.   POSTOPERATIVE DIAGNOSIS:  PCI of the LAD.  PROCEDURE PERFORMED:      SEDATION:  Conscious sedation was monitored by myself and the catheterization lab staff.  This started at 0753 and ended at 0857.    CLINICAL HISTORY:  The patient is referred for angiography.  The patient a few weeks ago had significant chest discomfort.  He has been short of breath since.  CTA revealed subtotal occlusion of the LAD.    DESCRIPTION OF PROCEDURE:  After informed consent obtained, the patient's right radial was prepped and draped in usual sterile fashion.  Radial appeared to bifurcate.  I took the superior branch which was about 2 to 2.2 mm in diameter.  It was entered after giving lidocaine under direct visualization using a micropuncture technique.  A micropuncture wire followed smoothly, followed by placement of a 5/6 Glidesheath.  Yamil right, left, and pigtail catheters were used for the diagnostic procedure.  Please see details regarding PCI procedure below.  At the end of the case, a TR band was placed over the right radial.  No apparent acute complications noted, and the patient tolerated the procedure well.    The patient received 2.5 mg of verapamil and a total of 400 mcg of nitroglycerin in the right radial.  The patient also received heparin to maintain ACT over 250 seconds.    CORONARIES:  Injection of the left main coronary artery did not reveal  significant disease in the left main.  Left main bifurcates into left anterior descending and circumflex coronary arteries.  The left anterior descending artery has mild luminal irregularities proximally.  After the takeoff of the septal, there is a moderate size diagonal branch with ostial stenosis of about 20%.  The LAD thereafter seems narrower followed by a 90% stenosis.  Distal LAD appears to be free of significant disease.  There almost looks like a healed ruptured plaque in the LAD.    The circumflex is somewhat smaller.  It gives rise to a high diagonal or ramus intermedius branch without significant disease.  The circumflex itself is a relatively small vessel without significant disease.  It gives rise to 1 posterolateral branch as it ends in the AV groove.    Right coronary artery is a dominant vessel.  There is a 40% to 50% stenosis proximally followed by another 40% to 50% stenosis in its distal midsection.  Right coronary artery bifurcates into posterior descending and posterolateral branches.  These appear to be free of significant disease.    VENTRICULOGRAPHY:  Ventriculography reveals moderately reduced left ventricular systolic function with estimated ejection fraction about 35%.  There appears to be significant hypokinesis of the distal anterior and apical walls.  Left ventricular end-diastolic pressure was about 10 to 12 mmHg.  No significant gradient was noted upon pullback across the aortic valve.    DESCRIPTION OF ANGIOPLASTY:  After ACT was over 250 seconds, an EBU 3.0 guiding catheter was used to cannulate the left main.  A 190 cm Runthrough wire was placed through into the distal LAD.  I was very careful with taking the available lumen and not causing a dissection flap in the LAD.  The area was dilated.  Intravascular ultrasound interrogation revealed distal vessel was about 3 mm.  Proximally, while there was some disease up to the bifurcation, this was not severe.  The area was dilated with  a 2.5 mm balloon.  I placed a 3.0 x 20 Synergy drug-eluting stent.  This was deployed at nominal pressures.  Angiography subsequently revealed what appeared to be a proximal significant stepdown.  I was uncomfortable with this.  Intravascular interrogation revealed that we had bisected a plaque.  Distally, there was no distal dissection.  Interrogated the more proximal segment of the LAD.   There appeared to be that we could be short of the diagonal and cover most of the disease.  Therefore, I placed a 3.0 x 12 drug-eluting stent across the area and postdilated all midsections at much higher pressures.  The entire stented segment again was postdilated with a 3 mm noncompliant balloon.  Subsequent interrogation with ultrasound revealed good placement of the stent both proximally and distally.  Angiographically, there was about a 20% stenosis of the ostium of the takeoff of the diagonal.  ABEL 3 flow was noted.  There was no significant residual stenosis in the LAD.  Procedure was, therefore, terminated.    SUMMARY:  In summary, the patient today underwent intervention of the mid LAD using 2 drug-eluting stents as noted above.    Dictated By Yusef Lozano M.D.  d: 05/06/2024 09:01:33  t: 05/06/2024 09:20:32  Job [id number]  AR/

## 2024-05-06 NOTE — DIETARY NOTE
Clinical Nutrition    Dietitian consult received per cardiac rehab standing order. Pt to be educated by cardiac rehab staff and encouraged to attend outpatient classes taught by RD. RD available PRN.    Sarah Cordon MS, RD, LDN  Clinical Dietitian  Ext: [phone]

## 2024-05-06 NOTE — PROGRESS NOTES
Pt post PCI. Pt awake, vss. Right radial artery access site is CDI with TR band in place.     Recovery complete per protocol. Vss. Pt has a couple episodes of  being \"winded\" after getting up to bathroom and also just lying in bed. O2 sats remain stable. No resp distress noted. Pt has stated that this has been happening since his admission for influenza A a while ago.  Pt has been sitting up in bed, voided and walked. TR band removed. Right radial site remains soft with no signs of bleeding or hematoma. Discharge instructions reviewed, iv dc'd and pt discharged home with sister driving.

## 2024-05-06 NOTE — CARDIAC REHAB
Cardiac rehab saw patient for education post stent.  Stent  card given to patient  Education completed.  Phase 2 appointment made.

## 2024-05-06 NOTE — OPERATIVE REPORT
Full note dictated,    90% mid LAD  Status post PTCA with stent placement  3.0 x 20 mm Synergy ANNE MARIE  3.0 x 12 mm Synergy ANNE MARIE   Postdilated with 3.0 mm NC  0% post  IVUS used    Yusef Lozano MD

## 2024-05-06 NOTE — DISCHARGE INSTRUCTIONS
HOME CARE INSTRUCTIONS FOLLOWING CORONARY ANGIOGRAPHY,  PERIPHERAL ANGIOGRAPHY, ANGIOPLASTY (PTCA/PTA) OR INSERTION  OF STENT IN THE CORONARY, CAROTID, AND/OR PERIPHERAL ARTERIES      Activity:   DO NOT drive after the procedure. You may resume driving late the following day according to the nurse or physician’s instructions   Plan on resting and relaxing tonight and tomorrow   Resume your normal activity after 48 hours, or as instructed by your physician   Do not lift anything over 10 pounds for the next 24 hours   Avoid sexual activity for the next 24 hours   Avoid drinking alcohol for the next 24 hours   If the wrist was used, avoid bending/flexing of the wrist for the next 24 hours.       What is Normal?   A small lump at the procedure site associated with mild tenderness when touched   The procedure site may be bruised or discolored   There may be a small amount of drainage on the bandage    Special Instructions:   Drink plenty of fluids during the next 24 hours to “flush” the contrast from your system   The bandage is to remain in place for 24 hours   Keep the bandage clean and dry   DO NOT submerge the procedure site for 72 hours (no bath tubs or pools). This includes dishwashing/submersion of the wrist, if the wrist was used   After 24 hours, you must remove the bandage   You should shower after removing the bandage, and wash the procedure site gently with soap and water   If you choose to wear a bandage for a few days, make sure it remains clean and dry and that it is changed daily    When you should NOTIFY YOUR PHYSICIAN:   Bleeding can occur at the procedure site - both on the outside of the skin and/or beneath the surface of the skin   Swelling or a large lump at the procedure site can occur, which may be accompanied by moderate to severe pain. If either of the above occurs, lie down flat. Have someone apply pressure to the procedure site with both hands, as instructed by the nurse. Hold pressure for 20  minutes and the bleeding should stop. Notify your physician of the occurrence. If the bleeding does not stop, call 911 and continue to apply pressure   If you experience signs of a fever, temperature >101 degrees, chills, infection (redness, swelling, thick yellow drainage, or a foul odor from the procedure site)   If you notice any numbness, tingling, or loss of feeling to your fingers or hand, if wrist access was utilized    If you Received a Stent:  - You will remain on an antiplatelet drug and/or aspirin. Antiplatelet medications are usually taken for six months to one year and should not be stopped unless your cardiologist directs you to do so. These medications help to prevent blockage at the stent site. If another physician or dentist asks you to stop your antiplatelet medication, you need to consult your cardiologist first. Together, your cardiologist and other physician can discuss the risks that may be involved if you are not taking the antiplatelet medication.   - If a MRI is necessary, it may be done 4-6 weeks after your procedure. Verify this with your cardiologist.  - Keep the stent card with you at all times! If you need a MRI in the future, your stent card will need to be shown to the technologist before performing the MRI. A duplicate card CANNOT be reproduced.     Other:  - You may resume your present diet, unless otherwise specified by your physician.   - You may resume all of your medications as prescribed, unless otherwise directed by your physician. A list of your medications was provided to you at discharge.  - Please call your physician's office for a follow-up appointment. You should be seen in 2 weeks.        - if you have any questions or concerns from now until 8am tomorrow morning call [phone]. This phone will be answered by a nurse from the cath lab.     - start taking Brilinta tonight.

## 2024-05-23 ENCOUNTER — ORDER TRANSCRIPTION (OUTPATIENT)
Dept: CARDIAC REHAB | Facility: HOSPITAL | Age: 59
End: 2024-05-23

## 2024-05-23 DIAGNOSIS — Z95.5 STENTED CORONARY ARTERY: Primary | ICD-10-CM

## 2024-05-30 ENCOUNTER — CARDPULM VISIT (OUTPATIENT)
Dept: CARDIAC REHAB | Facility: HOSPITAL | Age: 59
End: 2024-05-30
Attending: INTERNAL MEDICINE

## 2024-06-03 DIAGNOSIS — C78.6 MALIGNANT PSEUDOMYXOMA PERITONEI  (CMD): ICD-10-CM

## 2024-06-03 RX ORDER — OXYCODONE HCL 40 MG/1
40 TABLET, FILM COATED, EXTENDED RELEASE ORAL EVERY 12 HOURS SCHEDULED
Qty: 60 TABLET | Refills: 0 | Status: SHIPPED | OUTPATIENT
Start: 2024-06-03 | End: 2024-07-03

## 2024-06-04 ENCOUNTER — CARDPULM VISIT (OUTPATIENT)
Dept: CARDIAC REHAB | Facility: HOSPITAL | Age: 59
End: 2024-06-04
Attending: INTERNAL MEDICINE
Payer: MEDICARE

## 2024-06-04 PROCEDURE — 93798 PHYS/QHP OP CAR RHAB W/ECG: CPT

## 2024-06-05 ENCOUNTER — CARDPULM VISIT (OUTPATIENT)
Dept: CARDIAC REHAB | Facility: HOSPITAL | Age: 59
End: 2024-06-05
Attending: INTERNAL MEDICINE
Payer: MEDICARE

## 2024-06-05 PROCEDURE — 93798 PHYS/QHP OP CAR RHAB W/ECG: CPT

## 2024-06-06 ENCOUNTER — APPOINTMENT (OUTPATIENT)
Dept: CARDIAC REHAB | Facility: HOSPITAL | Age: 59
End: 2024-06-06
Attending: INTERNAL MEDICINE
Payer: MEDICARE

## 2024-06-07 ENCOUNTER — CARDPULM VISIT (OUTPATIENT)
Dept: CARDIAC REHAB | Facility: HOSPITAL | Age: 59
End: 2024-06-07
Attending: INTERNAL MEDICINE
Payer: MEDICARE

## 2024-06-07 PROCEDURE — 93798 PHYS/QHP OP CAR RHAB W/ECG: CPT

## 2024-06-10 ENCOUNTER — CARDPULM VISIT (OUTPATIENT)
Dept: CARDIAC REHAB | Facility: HOSPITAL | Age: 59
End: 2024-06-10
Attending: INTERNAL MEDICINE
Payer: MEDICARE

## 2024-06-10 PROCEDURE — 93798 PHYS/QHP OP CAR RHAB W/ECG: CPT

## 2024-06-11 ENCOUNTER — APPOINTMENT (OUTPATIENT)
Dept: CARDIAC REHAB | Facility: HOSPITAL | Age: 59
End: 2024-06-11
Attending: INTERNAL MEDICINE
Payer: MEDICARE

## 2024-06-12 ENCOUNTER — CARDPULM VISIT (OUTPATIENT)
Dept: CARDIAC REHAB | Facility: HOSPITAL | Age: 59
End: 2024-06-12
Attending: INTERNAL MEDICINE
Payer: MEDICARE

## 2024-06-12 PROCEDURE — 93798 PHYS/QHP OP CAR RHAB W/ECG: CPT

## 2024-06-13 ENCOUNTER — APPOINTMENT (OUTPATIENT)
Dept: CARDIAC REHAB | Facility: HOSPITAL | Age: 59
End: 2024-06-13
Attending: INTERNAL MEDICINE
Payer: MEDICARE

## 2024-06-14 ENCOUNTER — CARDPULM VISIT (OUTPATIENT)
Dept: CARDIAC REHAB | Facility: HOSPITAL | Age: 59
End: 2024-06-14
Attending: INTERNAL MEDICINE
Payer: MEDICARE

## 2024-06-14 PROCEDURE — 93798 PHYS/QHP OP CAR RHAB W/ECG: CPT

## 2024-06-17 ENCOUNTER — CARDPULM VISIT (OUTPATIENT)
Dept: CARDIAC REHAB | Facility: HOSPITAL | Age: 59
End: 2024-06-17
Attending: INTERNAL MEDICINE
Payer: MEDICARE

## 2024-06-17 PROCEDURE — 93798 PHYS/QHP OP CAR RHAB W/ECG: CPT

## 2024-06-18 ENCOUNTER — APPOINTMENT (OUTPATIENT)
Dept: CARDIAC REHAB | Facility: HOSPITAL | Age: 59
End: 2024-06-18
Attending: INTERNAL MEDICINE
Payer: MEDICARE

## 2024-06-19 ENCOUNTER — CARDPULM VISIT (OUTPATIENT)
Dept: CARDIAC REHAB | Facility: HOSPITAL | Age: 59
End: 2024-06-19
Attending: INTERNAL MEDICINE
Payer: MEDICARE

## 2024-06-19 PROCEDURE — 93798 PHYS/QHP OP CAR RHAB W/ECG: CPT

## 2024-06-20 ENCOUNTER — APPOINTMENT (OUTPATIENT)
Dept: CARDIAC REHAB | Facility: HOSPITAL | Age: 59
End: 2024-06-20
Attending: INTERNAL MEDICINE
Payer: MEDICARE

## 2024-06-21 ENCOUNTER — CARDPULM VISIT (OUTPATIENT)
Dept: CARDIAC REHAB | Facility: HOSPITAL | Age: 59
End: 2024-06-21
Attending: INTERNAL MEDICINE
Payer: MEDICARE

## 2024-06-21 PROCEDURE — 93798 PHYS/QHP OP CAR RHAB W/ECG: CPT

## 2024-06-24 ENCOUNTER — OFFICE VISIT (OUTPATIENT)
Dept: INTERNAL MEDICINE | Age: 59
End: 2024-06-24

## 2024-06-24 ENCOUNTER — CARDPULM VISIT (OUTPATIENT)
Dept: CARDIAC REHAB | Facility: HOSPITAL | Age: 59
End: 2024-06-24
Attending: INTERNAL MEDICINE
Payer: MEDICARE

## 2024-06-24 VITALS
SYSTOLIC BLOOD PRESSURE: 117 MMHG | DIASTOLIC BLOOD PRESSURE: 74 MMHG | HEART RATE: 52 BPM | OXYGEN SATURATION: 96 % | TEMPERATURE: 97.9 F | BODY MASS INDEX: 33.12 KG/M2 | WEIGHT: 241.84 LBS | RESPIRATION RATE: 20 BRPM

## 2024-06-24 DIAGNOSIS — R05.1 ACUTE COUGH: Primary | ICD-10-CM

## 2024-06-24 DIAGNOSIS — N18.31 STAGE 3A CHRONIC KIDNEY DISEASE  (CMD): ICD-10-CM

## 2024-06-24 DIAGNOSIS — C78.6 MALIGNANT PSEUDOMYXOMA PERITONEI  (CMD): ICD-10-CM

## 2024-06-24 PROCEDURE — 0241U COVID/FLU/RSV PANEL: CPT | Performed by: CLINICAL MEDICAL LABORATORY

## 2024-06-24 PROCEDURE — 99213 OFFICE O/P EST LOW 20 MIN: CPT | Performed by: INTERNAL MEDICINE

## 2024-06-24 RX ORDER — ATORVASTATIN CALCIUM 20 MG/1
20 TABLET, FILM COATED ORAL DAILY
COMMUNITY

## 2024-06-24 RX ORDER — SACUBITRIL AND VALSARTAN 24; 26 MG/1; MG/1
TABLET, FILM COATED ORAL
COMMUNITY
Start: 2024-04-30

## 2024-06-24 RX ORDER — ALBUTEROL SULFATE 90 UG/1
2 AEROSOL, METERED RESPIRATORY (INHALATION) EVERY 4 HOURS PRN
Qty: 1 EACH | Refills: 11 | Status: SHIPPED | OUTPATIENT
Start: 2024-06-24

## 2024-06-24 RX ORDER — TICAGRELOR 90 MG/1
TABLET ORAL
COMMUNITY
Start: 2024-05-06

## 2024-06-24 RX ORDER — CETIRIZINE HYDROCHLORIDE 10 MG/1
1 TABLET ORAL DAILY
COMMUNITY

## 2024-06-24 RX ORDER — AZITHROMYCIN 250 MG/1
TABLET, FILM COATED ORAL DAILY
Qty: 6 TABLET | Refills: 0 | Status: SHIPPED | OUTPATIENT
Start: 2024-06-24 | End: 2024-06-28

## 2024-06-24 RX ORDER — METHYLPREDNISOLONE 4 MG/1
4 TABLET ORAL SEE ADMIN INSTRUCTIONS
Qty: 21 TABLET | Refills: 0 | Status: SHIPPED | OUTPATIENT
Start: 2024-06-24

## 2024-06-24 RX ORDER — SPIRONOLACTONE 25 MG/1
TABLET ORAL
COMMUNITY
Start: 2024-04-30

## 2024-06-24 RX ORDER — ASPIRIN 81 MG/1
TABLET ORAL
COMMUNITY
Start: 2024-05-07

## 2024-06-24 ASSESSMENT — ENCOUNTER SYMPTOMS
EYES NEGATIVE: 1
ALLERGIC/IMMUNOLOGIC NEGATIVE: 1
ENDOCRINE NEGATIVE: 1
HEMATOLOGIC/LYMPHATIC NEGATIVE: 1
PSYCHIATRIC NEGATIVE: 1
GASTROINTESTINAL NEGATIVE: 1
SHORTNESS OF BREATH: 1
CONSTITUTIONAL NEGATIVE: 1
NEUROLOGICAL NEGATIVE: 1
COUGH: 1

## 2024-06-24 ASSESSMENT — PATIENT HEALTH QUESTIONNAIRE - PHQ9
CLINICAL INTERPRETATION OF PHQ2 SCORE: NO FURTHER SCREENING NEEDED
1. LITTLE INTEREST OR PLEASURE IN DOING THINGS: NOT AT ALL
2. FEELING DOWN, DEPRESSED OR HOPELESS: NOT AT ALL
SUM OF ALL RESPONSES TO PHQ9 QUESTIONS 1 AND 2: 0
SUM OF ALL RESPONSES TO PHQ9 QUESTIONS 1 AND 2: 0

## 2024-06-24 ASSESSMENT — PAIN SCALES - GENERAL: PAINLEVEL: 0

## 2024-06-25 ENCOUNTER — TELEPHONE (OUTPATIENT)
Dept: INTERNAL MEDICINE | Age: 59
End: 2024-06-25

## 2024-06-25 LAB
FLUAV RNA RESP QL NAA+PROBE: NOT DETECTED
FLUBV RNA RESP QL NAA+PROBE: NOT DETECTED
RSV AG NPH QL IA.RAPID: NOT DETECTED
SARS-COV-2 RNA RESP QL NAA+PROBE: NOT DETECTED
SERVICE CMNT-IMP: NORMAL
SERVICE CMNT-IMP: NORMAL

## 2024-06-26 ENCOUNTER — APPOINTMENT (OUTPATIENT)
Dept: CARDIAC REHAB | Facility: HOSPITAL | Age: 59
End: 2024-06-26
Payer: MEDICARE

## 2024-06-28 ENCOUNTER — APPOINTMENT (OUTPATIENT)
Dept: CARDIAC REHAB | Facility: HOSPITAL | Age: 59
End: 2024-06-28
Attending: INTERNAL MEDICINE
Payer: MEDICARE

## 2024-07-01 ENCOUNTER — CARDPULM VISIT (OUTPATIENT)
Dept: CARDIAC REHAB | Facility: HOSPITAL | Age: 59
End: 2024-07-01
Attending: INTERNAL MEDICINE
Payer: MEDICARE

## 2024-07-01 PROCEDURE — 93798 PHYS/QHP OP CAR RHAB W/ECG: CPT

## 2024-07-02 ENCOUNTER — LAB ENCOUNTER (OUTPATIENT)
Dept: LAB | Facility: HOSPITAL | Age: 59
End: 2024-07-02
Attending: NURSE PRACTITIONER
Payer: MEDICARE

## 2024-07-02 DIAGNOSIS — I25.10 CAD (CORONARY ARTERY DISEASE): Primary | ICD-10-CM

## 2024-07-02 DIAGNOSIS — R73.9 HYPERGLYCEMIA: ICD-10-CM

## 2024-07-02 DIAGNOSIS — I50.20 HEART FAILURE WITH REDUCED EJECTION FRACTION (HCC): ICD-10-CM

## 2024-07-02 LAB
ALBUMIN SERPL-MCNC: 3.7 G/DL (ref 3.4–5)
ALBUMIN/GLOB SERPL: 1 {RATIO} (ref 1–2)
ALP LIVER SERPL-CCNC: 73 U/L
ALT SERPL-CCNC: 29 U/L
ANION GAP SERPL CALC-SCNC: 4 MMOL/L (ref 0–18)
AST SERPL-CCNC: 15 U/L (ref 15–37)
BILIRUB SERPL-MCNC: 0.6 MG/DL (ref 0.1–2)
BUN BLD-MCNC: 34 MG/DL (ref 9–23)
CALCIUM BLD-MCNC: 9.2 MG/DL (ref 8.5–10.1)
CHLORIDE SERPL-SCNC: 108 MMOL/L (ref 98–112)
CHOLEST SERPL-MCNC: 101 MG/DL (ref ?–200)
CO2 SERPL-SCNC: 26 MMOL/L (ref 21–32)
CREAT BLD-MCNC: 2 MG/DL
EGFRCR SERPLBLD CKD-EPI 2021: 38 ML/MIN/1.73M2 (ref 60–?)
FASTING PATIENT LIPID ANSWER: YES
FASTING STATUS PATIENT QL REPORTED: YES
GLOBULIN PLAS-MCNC: 3.6 G/DL (ref 2.8–4.4)
GLUCOSE BLD-MCNC: 125 MG/DL (ref 70–99)
HDLC SERPL-MCNC: 37 MG/DL (ref 40–59)
LDLC SERPL CALC-MCNC: 38 MG/DL (ref ?–100)
NONHDLC SERPL-MCNC: 64 MG/DL (ref ?–130)
OSMOLALITY SERPL CALC.SUM OF ELEC: 295 MOSM/KG (ref 275–295)
POTASSIUM SERPL-SCNC: 5 MMOL/L (ref 3.5–5.1)
PROT SERPL-MCNC: 7.3 G/DL (ref 6.4–8.2)
SODIUM SERPL-SCNC: 138 MMOL/L (ref 136–145)
TRIGL SERPL-MCNC: 155 MG/DL (ref 30–149)
VLDLC SERPL CALC-MCNC: 21 MG/DL (ref 0–30)

## 2024-07-02 PROCEDURE — 80053 COMPREHEN METABOLIC PANEL: CPT

## 2024-07-02 PROCEDURE — 36415 COLL VENOUS BLD VENIPUNCTURE: CPT

## 2024-07-02 PROCEDURE — 80061 LIPID PANEL: CPT

## 2024-07-03 ENCOUNTER — CARDPULM VISIT (OUTPATIENT)
Dept: CARDIAC REHAB | Facility: HOSPITAL | Age: 59
End: 2024-07-03
Attending: INTERNAL MEDICINE
Payer: MEDICARE

## 2024-07-03 PROCEDURE — 93798 PHYS/QHP OP CAR RHAB W/ECG: CPT

## 2024-07-05 ENCOUNTER — CARDPULM VISIT (OUTPATIENT)
Dept: CARDIAC REHAB | Facility: HOSPITAL | Age: 59
End: 2024-07-05
Attending: INTERNAL MEDICINE
Payer: MEDICARE

## 2024-07-05 DIAGNOSIS — C78.6 MALIGNANT PSEUDOMYXOMA PERITONEI  (CMD): ICD-10-CM

## 2024-07-05 PROCEDURE — 93798 PHYS/QHP OP CAR RHAB W/ECG: CPT

## 2024-07-08 ENCOUNTER — CARDPULM VISIT (OUTPATIENT)
Dept: CARDIAC REHAB | Facility: HOSPITAL | Age: 59
End: 2024-07-08
Attending: INTERNAL MEDICINE
Payer: MEDICARE

## 2024-07-08 PROCEDURE — 93798 PHYS/QHP OP CAR RHAB W/ECG: CPT

## 2024-07-09 RX ORDER — OXYCODONE HCL 40 MG/1
40 TABLET, FILM COATED, EXTENDED RELEASE ORAL EVERY 12 HOURS SCHEDULED
Qty: 60 TABLET | Refills: 0 | Status: SHIPPED | OUTPATIENT
Start: 2024-07-09 | End: 2024-08-08

## 2024-07-10 ENCOUNTER — CARDPULM VISIT (OUTPATIENT)
Dept: CARDIAC REHAB | Facility: HOSPITAL | Age: 59
End: 2024-07-10
Attending: INTERNAL MEDICINE
Payer: MEDICARE

## 2024-07-10 PROCEDURE — 93798 PHYS/QHP OP CAR RHAB W/ECG: CPT

## 2024-07-12 ENCOUNTER — CARDPULM VISIT (OUTPATIENT)
Dept: CARDIAC REHAB | Facility: HOSPITAL | Age: 59
End: 2024-07-12
Attending: INTERNAL MEDICINE
Payer: MEDICARE

## 2024-07-12 PROCEDURE — 93798 PHYS/QHP OP CAR RHAB W/ECG: CPT

## 2024-07-15 ENCOUNTER — CARDPULM VISIT (OUTPATIENT)
Dept: CARDIAC REHAB | Facility: HOSPITAL | Age: 59
End: 2024-07-15
Attending: INTERNAL MEDICINE
Payer: MEDICARE

## 2024-07-15 PROCEDURE — 93798 PHYS/QHP OP CAR RHAB W/ECG: CPT

## 2024-07-17 ENCOUNTER — CARDPULM VISIT (OUTPATIENT)
Dept: CARDIAC REHAB | Facility: HOSPITAL | Age: 59
End: 2024-07-17
Attending: INTERNAL MEDICINE
Payer: MEDICARE

## 2024-07-17 PROCEDURE — 93798 PHYS/QHP OP CAR RHAB W/ECG: CPT

## 2024-07-19 ENCOUNTER — CARDPULM VISIT (OUTPATIENT)
Dept: CARDIAC REHAB | Facility: HOSPITAL | Age: 59
End: 2024-07-19
Attending: INTERNAL MEDICINE
Payer: MEDICARE

## 2024-07-19 PROCEDURE — 93798 PHYS/QHP OP CAR RHAB W/ECG: CPT

## 2024-07-22 ENCOUNTER — CARDPULM VISIT (OUTPATIENT)
Dept: CARDIAC REHAB | Facility: HOSPITAL | Age: 59
End: 2024-07-22
Attending: INTERNAL MEDICINE
Payer: MEDICARE

## 2024-07-22 PROCEDURE — 93798 PHYS/QHP OP CAR RHAB W/ECG: CPT

## 2024-07-24 ENCOUNTER — CARDPULM VISIT (OUTPATIENT)
Dept: CARDIAC REHAB | Facility: HOSPITAL | Age: 59
End: 2024-07-24
Attending: INTERNAL MEDICINE
Payer: MEDICARE

## 2024-07-24 ENCOUNTER — APPOINTMENT (OUTPATIENT)
Dept: CARDIAC REHAB | Facility: HOSPITAL | Age: 59
End: 2024-07-24
Payer: MEDICARE

## 2024-07-24 PROCEDURE — 93798 PHYS/QHP OP CAR RHAB W/ECG: CPT

## 2024-07-26 ENCOUNTER — APPOINTMENT (OUTPATIENT)
Dept: CARDIAC REHAB | Facility: HOSPITAL | Age: 59
End: 2024-07-26
Payer: MEDICARE

## 2024-07-26 ENCOUNTER — CARDPULM VISIT (OUTPATIENT)
Dept: CARDIAC REHAB | Facility: HOSPITAL | Age: 59
End: 2024-07-26
Attending: INTERNAL MEDICINE
Payer: MEDICARE

## 2024-07-26 PROCEDURE — 93798 PHYS/QHP OP CAR RHAB W/ECG: CPT

## 2024-07-29 ENCOUNTER — APPOINTMENT (OUTPATIENT)
Dept: CARDIAC REHAB | Facility: HOSPITAL | Age: 59
End: 2024-07-29
Payer: MEDICARE

## 2024-07-29 ENCOUNTER — APPOINTMENT (OUTPATIENT)
Dept: CARDIAC REHAB | Facility: HOSPITAL | Age: 59
End: 2024-07-29
Attending: INTERNAL MEDICINE
Payer: MEDICARE

## 2024-07-30 ENCOUNTER — TELEPHONE (OUTPATIENT)
Dept: INTERNAL MEDICINE | Age: 59
End: 2024-07-30

## 2024-07-31 ENCOUNTER — APPOINTMENT (OUTPATIENT)
Dept: CARDIAC REHAB | Facility: HOSPITAL | Age: 59
End: 2024-07-31
Payer: MEDICARE

## 2024-07-31 ENCOUNTER — CARDPULM VISIT (OUTPATIENT)
Dept: CARDIAC REHAB | Facility: HOSPITAL | Age: 59
End: 2024-07-31
Attending: INTERNAL MEDICINE
Payer: MEDICARE

## 2024-07-31 ENCOUNTER — APPOINTMENT (OUTPATIENT)
Dept: INTERNAL MEDICINE | Age: 59
End: 2024-07-31

## 2024-07-31 PROCEDURE — 93798 PHYS/QHP OP CAR RHAB W/ECG: CPT

## 2024-08-02 ENCOUNTER — CARDPULM VISIT (OUTPATIENT)
Dept: CARDIAC REHAB | Facility: HOSPITAL | Age: 59
End: 2024-08-02
Attending: INTERNAL MEDICINE
Payer: MEDICARE

## 2024-08-02 PROCEDURE — 93798 PHYS/QHP OP CAR RHAB W/ECG: CPT

## 2024-08-05 ENCOUNTER — EXTERNAL RECORD (OUTPATIENT)
Dept: HEALTH INFORMATION MANAGEMENT | Facility: OTHER | Age: 59
End: 2024-08-05

## 2024-08-05 ENCOUNTER — APPOINTMENT (OUTPATIENT)
Dept: CARDIAC REHAB | Facility: HOSPITAL | Age: 59
End: 2024-08-05
Attending: INTERNAL MEDICINE
Payer: MEDICARE

## 2024-08-07 ENCOUNTER — CARDPULM VISIT (OUTPATIENT)
Dept: CARDIAC REHAB | Facility: HOSPITAL | Age: 59
End: 2024-08-07
Attending: INTERNAL MEDICINE
Payer: MEDICARE

## 2024-08-07 DIAGNOSIS — C78.6 MALIGNANT PSEUDOMYXOMA PERITONEI  (CMD): ICD-10-CM

## 2024-08-07 PROCEDURE — 93798 PHYS/QHP OP CAR RHAB W/ECG: CPT

## 2024-08-07 RX ORDER — OXYCODONE HCL 40 MG/1
40 TABLET, FILM COATED, EXTENDED RELEASE ORAL EVERY 12 HOURS SCHEDULED
Qty: 60 TABLET | Refills: 0 | Status: SHIPPED | OUTPATIENT
Start: 2024-08-07 | End: 2024-09-06

## 2024-08-09 ENCOUNTER — APPOINTMENT (OUTPATIENT)
Dept: CARDIAC REHAB | Facility: HOSPITAL | Age: 59
End: 2024-08-09
Attending: INTERNAL MEDICINE
Payer: MEDICARE

## 2024-08-12 ENCOUNTER — CARDPULM VISIT (OUTPATIENT)
Dept: CARDIAC REHAB | Facility: HOSPITAL | Age: 59
End: 2024-08-12
Attending: INTERNAL MEDICINE
Payer: MEDICARE

## 2024-08-12 PROCEDURE — 93798 PHYS/QHP OP CAR RHAB W/ECG: CPT

## 2024-08-14 ENCOUNTER — APPOINTMENT (OUTPATIENT)
Dept: CARDIAC REHAB | Facility: HOSPITAL | Age: 59
End: 2024-08-14
Attending: INTERNAL MEDICINE
Payer: MEDICARE

## 2024-08-19 ENCOUNTER — APPOINTMENT (OUTPATIENT)
Dept: CARDIAC REHAB | Facility: HOSPITAL | Age: 59
End: 2024-08-19
Attending: INTERNAL MEDICINE
Payer: MEDICARE

## 2024-08-21 ENCOUNTER — CARDPULM VISIT (OUTPATIENT)
Dept: CARDIAC REHAB | Facility: HOSPITAL | Age: 59
End: 2024-08-21
Attending: INTERNAL MEDICINE
Payer: MEDICARE

## 2024-08-23 ENCOUNTER — APPOINTMENT (OUTPATIENT)
Dept: CARDIAC REHAB | Facility: HOSPITAL | Age: 59
End: 2024-08-23
Attending: INTERNAL MEDICINE
Payer: MEDICARE

## 2024-09-05 DIAGNOSIS — C78.6 MALIGNANT PSEUDOMYXOMA PERITONEI  (CMD): ICD-10-CM

## 2024-09-06 RX ORDER — OXYCODONE HCL 40 MG/1
40 TABLET, FILM COATED, EXTENDED RELEASE ORAL EVERY 12 HOURS SCHEDULED
Qty: 60 TABLET | Refills: 0 | Status: SHIPPED | OUTPATIENT
Start: 2024-09-06 | End: 2024-10-06

## 2024-10-07 DIAGNOSIS — C78.6 MALIGNANT PSEUDOMYXOMA PERITONEI  (CMD): ICD-10-CM

## 2024-10-08 RX ORDER — OXYCODONE HCL 40 MG/1
40 TABLET, FILM COATED, EXTENDED RELEASE ORAL EVERY 12 HOURS SCHEDULED
Qty: 60 TABLET | Refills: 0 | Status: SHIPPED | OUTPATIENT
Start: 2024-10-08 | End: 2024-11-07

## 2024-10-15 ENCOUNTER — HOSPITAL ENCOUNTER (OUTPATIENT)
Dept: CT IMAGING | Facility: HOSPITAL | Age: 59
Discharge: HOME | End: 2024-10-15
Payer: MEDICARE

## 2024-10-15 ENCOUNTER — LAB ENCOUNTER (OUTPATIENT)
Dept: LAB | Facility: HOSPITAL | Age: 59
End: 2024-10-15
Payer: MEDICARE

## 2024-10-15 DIAGNOSIS — R97.8 OTHER ABNORMAL TUMOR MARKERS: ICD-10-CM

## 2024-10-15 DIAGNOSIS — C78.6 PSEUDOMYXOMA PERITONEI (HCC): ICD-10-CM

## 2024-10-15 DIAGNOSIS — Z79.899 ENCOUNTER FOR LONG-TERM (CURRENT) DRUG USE: Primary | ICD-10-CM

## 2024-10-15 DIAGNOSIS — Z87.39 HISTORY OF GOUT: ICD-10-CM

## 2024-10-15 LAB
ALBUMIN SERPL-MCNC: 4.7 G/DL (ref 3.2–4.8)
ALBUMIN/GLOB SERPL: 1.7 {RATIO} (ref 1–2)
ALP LIVER SERPL-CCNC: 80 U/L
ALT SERPL-CCNC: 16 U/L
ANION GAP SERPL CALC-SCNC: 3 MMOL/L (ref 0–18)
AST SERPL-CCNC: 19 U/L (ref ?–34)
BASOPHILS # BLD AUTO: 0.07 X10(3) UL (ref 0–0.2)
BASOPHILS NFR BLD AUTO: 0.7 %
BILIRUB SERPL-MCNC: 0.6 MG/DL (ref 0.2–1.1)
BUN BLD-MCNC: 15 MG/DL (ref 9–23)
CALCIUM BLD-MCNC: 10.3 MG/DL (ref 8.7–10.4)
CANCER AG125 SERPL-ACNC: 5 U/ML (ref ?–30.2)
CANCER AG19-9 SERPL-ACNC: 7.1 U/ML (ref ?–35)
CEA SERPL-MCNC: 3.8 NG/ML (ref ?–5)
CHLORIDE SERPL-SCNC: 110 MMOL/L (ref 98–112)
CO2 SERPL-SCNC: 28 MMOL/L (ref 21–32)
CREAT BLD-MCNC: 1.56 MG/DL
EGFRCR SERPLBLD CKD-EPI 2021: 51 ML/MIN/1.73M2 (ref 60–?)
EOSINOPHIL # BLD AUTO: 0.57 X10(3) UL (ref 0–0.7)
EOSINOPHIL NFR BLD AUTO: 5.4 %
ERYTHROCYTE [DISTWIDTH] IN BLOOD BY AUTOMATED COUNT: 12.4 %
FASTING STATUS PATIENT QL REPORTED: YES
GLOBULIN PLAS-MCNC: 2.8 G/DL (ref 2–3.5)
GLUCOSE BLD-MCNC: 126 MG/DL (ref 70–99)
HCT VFR BLD AUTO: 43.3 %
HGB BLD-MCNC: 14.7 G/DL
IMM GRANULOCYTES # BLD AUTO: 0.02 X10(3) UL (ref 0–1)
IMM GRANULOCYTES NFR BLD: 0.2 %
LYMPHOCYTES # BLD AUTO: 2.56 X10(3) UL (ref 1–4)
LYMPHOCYTES NFR BLD AUTO: 24.2 %
MCH RBC QN AUTO: 33 PG (ref 26–34)
MCHC RBC AUTO-ENTMCNC: 33.9 G/DL (ref 31–37)
MCV RBC AUTO: 97.1 FL
MONOCYTES # BLD AUTO: 0.95 X10(3) UL (ref 0.1–1)
MONOCYTES NFR BLD AUTO: 9 %
NEUTROPHILS # BLD AUTO: 6.41 X10 (3) UL (ref 1.5–7.7)
NEUTROPHILS # BLD AUTO: 6.41 X10(3) UL (ref 1.5–7.7)
NEUTROPHILS NFR BLD AUTO: 60.5 %
OSMOLALITY SERPL CALC.SUM OF ELEC: 294 MOSM/KG (ref 275–295)
PLATELET # BLD AUTO: 294 10(3)UL (ref 150–450)
POTASSIUM SERPL-SCNC: 4.6 MMOL/L (ref 3.5–5.1)
PROT SERPL-MCNC: 7.5 G/DL (ref 5.7–8.2)
RBC # BLD AUTO: 4.46 X10(6)UL
SODIUM SERPL-SCNC: 141 MMOL/L (ref 136–145)
URATE SERPL-MCNC: 4.1 MG/DL
WBC # BLD AUTO: 10.6 X10(3) UL (ref 4–11)

## 2024-10-15 PROCEDURE — 84550 ASSAY OF BLOOD/URIC ACID: CPT

## 2024-10-15 PROCEDURE — 74177 CT ABD & PELVIS W/CONTRAST: CPT

## 2024-10-15 PROCEDURE — 86304 IMMUNOASSAY TUMOR CA 125: CPT

## 2024-10-15 PROCEDURE — 80053 COMPREHEN METABOLIC PANEL: CPT

## 2024-10-15 PROCEDURE — 86301 IMMUNOASSAY TUMOR CA 19-9: CPT

## 2024-10-15 PROCEDURE — 36415 COLL VENOUS BLD VENIPUNCTURE: CPT

## 2024-10-15 PROCEDURE — 85025 COMPLETE CBC W/AUTO DIFF WBC: CPT

## 2024-10-15 PROCEDURE — 82378 CARCINOEMBRYONIC ANTIGEN: CPT

## 2024-10-25 ENCOUNTER — TELEPHONE (OUTPATIENT)
Dept: OTHER | Age: 59
End: 2024-10-25

## 2024-10-29 NOTE — PROGRESS NOTES
[facility] Surgical Oncology      Patient Name:  Pancho Arellano   YOB: 1965   Gender:  Male   Appt Date:  10/30/2024   Provider:  Yonas Tapia MD     PATIENT PROVIDERS  Primary Care Provider:Jose Mills MD   Address: [address] [place] [address]   Phone #: [phone]       CHIEF COMPLAINT  Chief Complaint   Patient presents with    Follow - Up     Pseudomyxoma peritonei         PROBLEMS  Reviewed   Patient Active Problem List   Diagnosis    Opioid abuse (HCC)    Gouty arthritis    Pseudomyxoma peritonei (HCC)    Colon polyp    Lumbosacral spondylosis without myelopathy    Degeneration of lumbar or lumbosacral intervertebral disc    Osteoarthrosis, hip    Lumbar spondylosis    Gout    Calculus of kidney    Adiposity    Current smoker    Neuropathic pain    Gastroesophageal reflux disease    Personal history of colonic polyps    Benign neoplasm of sigmoid colon    Hyponatremia    Acute kidney injury (HCC)    Metabolic alkalosis    Hyperglycemia    Influenza B    Hypoxemia    Acute respiratory failure with hypoxia (HCC)    MI (myocardial infarction) (HCC)        History of Present Illness:  Pseudomyxoma Peritonei   10/2013: had CT for kidney stones and 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 Biopsy was consistent with mucinous neoplasm. Review at [facility] suggested low grade mucinous neoplasm. CEA was 30.2;  was 76.  1/11/2013: CRS/HIPEC with Mitomycin C (Dr Cedillo) to include peritonectomy, right colectomy, omentectomy. PCI was 18; cc0/cc1 achived. Post op CEA was 2.0  10/2014: rise in CEA and nodule progression.  11/23/2014: redo CRS/HIPEC with [facility]. PCI 15; cc1/2 achieved. There were significant dense adhesions of the right liver lobe, right diaphragm, and zain hepatis with disease densely adherent to hepatic artery.  He has been under active surveillance.  5/12/2015: CT--->Index subcapsular focus along posterior margin of right lobe 1 cm by 0.6 cm.  Previously measured 1.1 X 0.8 cm. Additional implant along the posterior margin of the liver right lobe 1.5 x 0.9 cm (previously 1.8 x 1.3 cm). Several small gastrohepatic and portacaval nodes unchanged or smaller.  5/12/2015: CEA---> 3.6  11/19/2015: MRI--->Stable serosal T2 hyperintense lesion on the medial aspect of right lobe of the liver measuring 20 x 20 mm. This lesion demonstrates restricted diffusion and rim enhancement. This also stable lesion on the right posterior liver near the bare area measuring 10 x 11 mm. There is also a stable tiny lesion just superiorly along the posterior bare area of the liver measuring 9 x 4 mm. CEA 2.4; CA19.9   7.6;   2.1  4/28/2016: MRI with stable findings. CEA 2.8, CA 19.9 14.1,  3.4.    Patient had an allergic reaction to the MRI contrast of Magnevist.  10/5/16: CT NO RECURRENCE OR METS  10/5/16: CEA 2; CA19.9  8.9;   3.7  4/11/2017 CT: Enlarging low attenuation cystic structure in the left upper quadrant abutting the gastric antrum measuring 3.8 x 3.2 cm. No significant change and low attenuation subcapsular mass along the posterior margin right lobe of the liver measuring 11 x 6 mm previously 11 x 5 mm. Stable low-attenuation cystic mass posterior medial margin right lobe liver measuring 2.0 x 1.9 cm.  4/11/2017: CEA  3.3;  CA19.9  13.3;    3.4  10/10/17: CEA  4.9;  CA19.9  23.5;    2.6  10/10/2017 CT: There is a now bilobed hypodense lesion along the inferior aspect of the body of the stomach which is worse since 8/3/17. This lesion measures 6.6 x 4.6 cm whereas on 8/3/17, this measured 4.4 x 3.5 cm. There is a nonspecific 9 mm hypodensity along the posterior margin of the right hepatic lobe which previously measured 7 mm   1/5/17: CEA 3.8  1/5/17: Bilobed low-density lesion adjacent to the greater curvature of the stomach is stable.  Exophytic low-density lesions adjacent to the right hepatic lobe are stable.  Mildly prominent zain hepatis  lymph node is stable.  Multiple nonenlarged   retroperitoneal lymph nodes are stable.  No significant change since prior exam.  Nonobstructing calculi in both kidneys are stable.  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  Small lesions along the right lobe of the liver are stable.  7/10/18: CEA: 3.8, CA 19-9: 11.5, CA-125: 3.2  7/10/18: CT abdomen/pelvis: Minimal increase in size of bilobed cystic structure along the greater curvature of the stomach likely  related to pseudomyxoma peritonei.  Small lesions along the right lobe of the liver are stable.CT 7/13/2019à.  There is increasing size of a bilobed fluid collection along the greater curvature of the stomach now measuring approximately 9.1 x 7.2 cm, previously 8.3 x 4.5 cm that is concerning for worsening changes of pseudomyxoma peritonei.  CEA-à 4.1; CA 19.9-à 11.5; -à 3.8  CT 10/9/19: Worsening 10.8 cm region of probable pseudomyxoma peritonei within the left upper quadrant of the abdomen along the inferior aspect of the stomach and subjacent to the left diaphragm.  11/19/2019: patient underwent cytoreductive surgery to include partial hepatectomy, partial resection left diaphragm, small-bowel resection, and hyperthermic intraperitoneal with mitomycin C; PCI 6, CC 0.      Admitted 12/25/2020 for small bowel obstruction. Resolved with conservative management.     Interval History:  10/15/2024: CT A/P stable with hypo-attenuating lesion along the serosal surface of the distal stomach. No evidence of new or enlarging peritoneal disease.     Vital Signs:  /65 (BP Location: Right arm, Patient Position: Sitting, Cuff Size: adult)   Pulse 67   Temp 98.1 °F (36.7 °C)   Resp 16   Wt 108.4 kg (239 lb)   SpO2 94%   BMI 32.41 kg/m²      Medications Reviewed:    Current Outpatient Medications:     aspirin 81 MG Oral Tab EC, Take 1  tablet (81 mg total) by mouth daily., Disp: 30 tablet, Rfl: 0    ticagrelor 90 MG Oral Tab, Take 1 tablet (90 mg total) by mouth every 12 (twelve) hours., Disp: 60 tablet, Rfl: 3    sacubitril-valsartan 24-26 MG Oral Tab, Take 1 tablet by mouth daily., Disp: , Rfl:     cetirizine 10 MG Oral Tab, Take 1 tablet (10 mg total) by mouth daily., Disp: , Rfl:     spironolactone 25 MG Oral Tab, Take 0.5 tablets (12.5 mg total) by mouth daily. (Patient not taking: Reported on 5/3/2024), Disp: , Rfl:     metoprolol succinate ER 25 MG Oral Tablet 24 Hr, Take 0.5 tablets (12.5 mg total) by mouth Daily Beta Blocker. (Patient taking differently: Take 1 tablet (25 mg total) by mouth Daily Beta Blocker.), Disp: 60 tablet, Rfl: 1    losartan 25 MG Oral Tab, Take 1 tablet (25 mg total) by mouth daily., Disp: 30 tablet, Rfl: 1    FEBUXOSTAT 80 MG Oral Tab, TAKE 1 TABLET BY MOUTH DAILY, Disp: 90 tablet, Rfl: 1    oxyCODONE HCl ER 40 MG Oral Tablet Extended Release 12 hour Abuse-Deterrent, Take 1 tablet (40 mg total) by mouth Q12H., Disp: , Rfl:     acetaminophen 500 MG Oral Tab, Take 2 tablets (1,000 mg total) by mouth every 6 (six) hours as needed for Pain., Disp: , Rfl:      Allergies Reviewed:  Allergies[1]     History:  Reviewed:  Past Medical History:    Back problem    nerve pain.  Left side rib cage    Calculus of kidney    Cancer (HCC)        Depression    20 yrs ago    GERD    GOUT    Pseudomyxoma peritonei (HCC)    Visual impairment    glasses      Reviewed:  Past Surgical History:   Procedure Laterality Date    Colonoscopy      Colonoscopy & polypectomy  11/12    polyps; no cancer; repeat 3 yrs    Colonoscopy & polypectomy  1/16    polyp; repeat 5 yrs (hyperplastic but hx of adenoma)    Colonoscopy,biopsy N/A 1/25/2016    Procedure: COLONOSCOPY, POSSIBLE BIOPSY, POSSIBLE POLYPECTOMY 28494;  Surgeon: Awais Scott MD;  Location: [facility] [place]    Colonoscopy,remv lesn,snare  11/5/2012    Procedure:  ESOPHAGOGASTRODUODENOSCOPY, COLONOSCOPY, POSSIBLE BIOPSY, POSSIBLE POLYPECTOMY 65711,97643;  Surgeon: Awais Scott MD;  Location: [facility]    Colonoscpy, flexible, proximal to splenic flexure; w/directed submucosa injection(s), any substance  11/5/2012    Procedure: ESOPHAGOGASTRODUODENOSCOPY, COLONOSCOPY, POSSIBLE BIOPSY, POSSIBLE POLYPECTOMY 32487,70124;  Surgeon: Awais Scott MD;  Location: [facility]    Cystoscopy,remv calculus,simple  11/20/2012    Procedure: CYSTOSCOPY WITH REMOVAL OF STENT;  Surgeon: Gerard Taylor MD;  Location: [facility]    Fluor gid & loclzj ndl/cath spi dx/ther njx  3/27/2014    Procedure: LUMBAR EPIDURAL;  Surgeon: Geovanny Ricci MD;  Location: [facility]    Fluor gid & loclzj ndl/cath spi dx/ther njx  4/15/2014    Procedure: LUMBAR EPIDURAL;  Surgeon: Geovanny Ricci MD;  Location: [facility]    Fluor gid & loclzj ndl/cath spi dx/ther njx  4/29/2014    Procedure: LUMBAR EPIDURAL;  Surgeon: Geovanny Ricci MD;  Location: [facility]    Fragmenting of kidney stone  11/20/2012    Procedure: LITHOTRIPSY WITH CYSTOSCOPY, STENT PLACEMENT;  Surgeon: Gerard Taylor MD;  Location: [facility]    Gastro - dmg  11/12    normal    Injection, w/wo contrast, dx/therapeutic substance, epidural/subarachnoid; lumbar/sacral  3/27/2014    Procedure: LUMBAR EPIDURAL;  Surgeon: Geovanny Ricci MD;  Location: [facility]    Injection, w/wo contrast, dx/therapeutic substance, epidural/subarachnoid; lumbar/sacral  4/15/2014    Procedure: LUMBAR EPIDURAL;  Surgeon: Geovanny Ricci MD;  Location: [facility]    Injection, w/wo contrast, dx/therapeutic substance, epidural/subarachnoid; lumbar/sacral  4/29/2014    Procedure: LUMBAR EPIDURAL;  Surgeon: Geovanny Ricci MD;  Location: [facility]    Ir ureter tube removal via  ileal conduit      Lithotripsy  11/20/12    Other surgical history      bilat knee scopes/multiple each knee    Other surgical history      multiple ankle procedures/skin surgeries    Other surgical history  10-24-12    sx-EDW-Left ureteral stone, left pyelonephritisi-Dr. Taylor    Other surgical history  1/2013     Pseuodmyxoma Peritoneii stage IV s/p DAISY, omenentectomy, right hemicolectomy, and intraperitoneal chemotherapy 01/2013    Other surgical history  07/22/13    Cystoscopy with Stent Removal - Dr. Taylor    Other surgical history  11/23/2014    Exp lap, extensive cytoreduction of abdomina tumor with peritoneal stripping, partial greater omentectomy,  HIPEC with mitomycin C     Other surgical history  11/19/2019    Cytoreductive surgery, Partial hepatectomy, Partial resection of left diaphragm, Small-bowel resection, Hyperthermic intraperitoneal chemotherapy with mitomycin C    Patient documented not to have experienced any of the following events  11/20/2012    Procedure: CYSTOSCOPY WITH REMOVAL OF STENT;  Surgeon: Gerard Taylor MD;  Location: [facility]    Patient documented not to have experienced any of the following events  11/20/2012    Procedure: CYSTOSCOPY WITH REMOVAL OF STENT;  Surgeon: Gerard Taylor MD;  Location: [facility]    Patient documented not to have experienced any of the following events  3/27/2014    Procedure: LUMBAR EPIDURAL;  Surgeon: Geovanny Ricci MD;  Location: [facility]    Patient documented not to have experienced any of the following events  4/15/2014    Procedure: LUMBAR EPIDURAL;  Surgeon: Geovanny Ricci MD;  Location: [facility]    Patient documented not to have experienced any of the following events  4/29/2014    Procedure: LUMBAR EPIDURAL;  Surgeon: Geovanny Ricci MD;  Location: [facility]    Patient documented not to have experienced any of the following events N/A 1/25/2016     Procedure: COLONOSCOPY, POSSIBLE BIOPSY, POSSIBLE POLYPECTOMY 15234;  Surgeon: Awais Scott MD;  Location: [facility]    Patient with preoperative order for iv antibiotic surgical site infect  11/20/2012    Procedure: CYSTOSCOPY WITH REMOVAL OF STENT;  Surgeon: Gerard Taylor MD;  Location: [facility]    Patient withough preoperative order for iv antibiotic surgical site infection prophylaxis.  11/5/2012    Procedure: ESOPHAGOGASTRODUODENOSCOPY, COLONOSCOPY, POSSIBLE BIOPSY, POSSIBLE POLYPECTOMY 68233,88923;  Surgeon: Awais Scott MD;  Location: [facility]    Patient withough preoperative order for iv antibiotic surgical site infection prophylaxis.  3/27/2014    Procedure: LUMBAR EPIDURAL;  Surgeon: Geovanny Ricci MD;  Location: [facility]    Patient withough preoperative order for iv antibiotic surgical site infection prophylaxis.  4/15/2014    Procedure: LUMBAR EPIDURAL;  Surgeon: Geovanny Ricci MD;  Location: [facility]    Patient withough preoperative order for iv antibiotic surgical site infection prophylaxis.  4/29/2014    Procedure: LUMBAR EPIDURAL;  Surgeon: Geovanny Ricci MD;  Location: [facility]    Patient withough preoperative order for iv antibiotic surgical site infection prophylaxis. N/A 1/25/2016    Procedure: COLONOSCOPY, POSSIBLE BIOPSY, POSSIBLE POLYPECTOMY 12934;  Surgeon: Awais Scott MD;  Location: [facility]    Upper gi endoscopy,diagnosis  11/5/2012    Procedure: ESOPHAGOGASTRODUODENOSCOPY, COLONOSCOPY, POSSIBLE BIOPSY, POSSIBLE POLYPECTOMY 76375,74673;  Surgeon: Awais Scott MD;  Location: [facility]      Reviewed Social History:  Social History     Socioeconomic History    Marital status:    Tobacco Use    Smoking status: Former     Current packs/day: 0.00     Average packs/day: 1.5 packs/day for 16.0 years (24.0 ttl  pk-yrs)     Types: Cigarettes, Cigars     Start date: 2000     Quit date: 2016     Years since quittin.9    Smokeless tobacco: Never    Tobacco comments:     quit Dec 2016.  1 cigar per day   Vaping Use    Vaping status: Never Used   Substance and Sexual Activity    Alcohol use: No     Alcohol/week: 0.0 standard drinks of alcohol    Drug use: No   Other Topics Concern    Caffeine Concern Yes     Comment: Mountain Dew     Social Drivers of Health     Food Insecurity: No Food Insecurity (3/27/2024)    Food Insecurity     Food Insecurity: Never true   Transportation Needs: No Transportation Needs (3/27/2024)    Transportation Needs     Lack of Transportation: No   Physical Activity: Sufficiently Active (2020)    [facility]    Exercise Vital Sign     Days of Exercise per Week: 5 days     Minutes of Exercise per Session: 40 min   Housing Stability: Low Risk  (3/27/2024)    Housing Stability     Housing Instability: No      Reviewed:  Family History   Problem Relation Age of Onset    Cancer Maternal Grandmother     Diabetes Father     Diabetes Mother     Diabetes Brother       Review of Systems:  Patient reports no rapid or irregular heartbeat. He reports no chest pain. He reports no nausea. He reports no vomiting. He reports no diarrhea. He reports no constipation. He reports no bright red blood per rectum. He reports no fatigue. He reports no blood in the urine, no changes in urinary habits: initiating urination requires straining, no changes in urinary habits: delays in starting hesitancy, and no change in urine appearance. He reports no headache. He reports no dizziness. He reports no easy bruising tendency. He reports no diffuse joint pains. He reports no bone pain. He reports no back pain. He reports no swollen glands. He reports no numbness. He reports no shortness of breath. He reports no change in a mole. He reports  No recent change in weight,  no fever, no chills, and no sweating heavily at night        Physical Examination:  Constitutional: General Appearance: healthy-appearing, well-nourished, and well-developed. Level of Distress: NAD.   Eyes: Sclera: non-icteric.   Neck: Neck: supple.   Lymph Nodes: Lymph Nodes no cervical LAD, supraclavicular LAD, axillary LAD, or inguinal LAD.   Abdomen: Inspection and Palpation: no masses, tenderness (no guarding, no rebound), or CVA tenderness and soft and non-distended. Liver: no hepatomegaly. Spleen: no splenomegaly. Hernia: none palpable.   Musculoskeletal: Extremities: no edema.   Skin: Inspection and palpation: no jaundice.      Document Review:  10/15/2024 CT A/P:  1. Stable examination as compared to 4/17/2024.  Stable hypoattenuating lesion along the serosal surface of the distal stomach.  No evidence of new or enlarging peritoneal disease.          Procedure(s):  None     Assessment / Plan:  Pseudomyxoma peritonei (HCC) (C78.6)  Doing well clinically and radiographically at about 5 years post last CRS/HIPEC.  Continue surveillance.  Will start every year follow-ups      Follow Up:  1 yr       Electronically Signed by: Yonas Tapia MD           [1]   Allergies  Allergen Reactions    Magnevist [Gadopentetate Dimeglumine] ANGIOEDEMA     MRI CONTRAST ALLERGY  1/2/18: pt states he got significant HA w/contrast injection, requiring hydration and hospitalization in past. NO problem w/CT contrast dye. CKRN     Eggs Or Egg-Derived Products OTHER (SEE COMMENTS)     \"Flu-like symptoms\"-only if has more than one egg    Morphine NAUSEA ONLY

## 2024-10-30 ENCOUNTER — OFFICE VISIT (OUTPATIENT)
Dept: SURGERY | Facility: CLINIC | Age: 59
End: 2024-10-30
Payer: MEDICARE

## 2024-10-30 VITALS
RESPIRATION RATE: 16 BRPM | HEART RATE: 67 BPM | SYSTOLIC BLOOD PRESSURE: 100 MMHG | BODY MASS INDEX: 32 KG/M2 | TEMPERATURE: 98 F | WEIGHT: 239 LBS | OXYGEN SATURATION: 94 % | DIASTOLIC BLOOD PRESSURE: 65 MMHG

## 2024-10-30 DIAGNOSIS — C18.1 MALIGNANT NEOPLASM OF APPENDIX (HCC): ICD-10-CM

## 2024-10-30 DIAGNOSIS — R19.09 OTHER INTRA-ABDOMINAL AND PELVIC SWELLING, MASS AND LUMP: ICD-10-CM

## 2024-10-30 DIAGNOSIS — C78.80 SECONDARY MALIGNANT NEOPLASM OF UNSPECIFIED DIGESTIVE ORGAN (HCC): ICD-10-CM

## 2024-10-30 DIAGNOSIS — C78.6 PSEUDOMYXOMA PERITONEI (HCC): Primary | ICD-10-CM

## 2024-10-30 PROCEDURE — 99213 OFFICE O/P EST LOW 20 MIN: CPT | Performed by: SURGERY

## 2024-11-07 ENCOUNTER — OFFICE VISIT (OUTPATIENT)
Dept: INTERNAL MEDICINE | Age: 59
End: 2024-11-07

## 2024-11-07 VITALS
SYSTOLIC BLOOD PRESSURE: 109 MMHG | TEMPERATURE: 97.3 F | HEART RATE: 74 BPM | BODY MASS INDEX: 33.1 KG/M2 | WEIGHT: 244.38 LBS | RESPIRATION RATE: 14 BRPM | HEIGHT: 72 IN | OXYGEN SATURATION: 95 % | DIASTOLIC BLOOD PRESSURE: 68 MMHG

## 2024-11-07 DIAGNOSIS — I42.9 CARDIOMYOPATHY, UNSPECIFIED TYPE  (CMD): ICD-10-CM

## 2024-11-07 DIAGNOSIS — Z12.5 PROSTATE CANCER SCREENING: ICD-10-CM

## 2024-11-07 DIAGNOSIS — Z12.12 SCREENING FOR COLORECTAL CANCER: ICD-10-CM

## 2024-11-07 DIAGNOSIS — Z12.11 SCREENING FOR COLORECTAL CANCER: ICD-10-CM

## 2024-11-07 DIAGNOSIS — N18.31 STAGE 3A CHRONIC KIDNEY DISEASE  (CMD): ICD-10-CM

## 2024-11-07 DIAGNOSIS — C78.6 MALIGNANT PSEUDOMYXOMA PERITONEI  (CMD): ICD-10-CM

## 2024-11-07 DIAGNOSIS — Z00.00 MEDICARE ANNUAL WELLNESS VISIT, SUBSEQUENT: Primary | ICD-10-CM

## 2024-11-07 DIAGNOSIS — F11.10 OPIOID ABUSE, CONTINUOUS  (CMD): ICD-10-CM

## 2024-11-07 DIAGNOSIS — M48.061 SPINAL STENOSIS OF LUMBAR REGION WITHOUT NEUROGENIC CLAUDICATION: ICD-10-CM

## 2024-11-07 RX ORDER — OXYCODONE HCL 40 MG/1
40 TABLET, FILM COATED, EXTENDED RELEASE ORAL EVERY 12 HOURS SCHEDULED
Qty: 60 TABLET | Refills: 0 | Status: SHIPPED | OUTPATIENT
Start: 2024-11-07 | End: 2024-12-07

## 2024-11-07 ASSESSMENT — PATIENT HEALTH QUESTIONNAIRE - PHQ9
1. LITTLE INTEREST OR PLEASURE IN DOING THINGS: NOT AT ALL
CLINICAL INTERPRETATION OF PHQ2 SCORE: NO FURTHER SCREENING NEEDED
SUM OF ALL RESPONSES TO PHQ9 QUESTIONS 1 AND 2: 0
SUM OF ALL RESPONSES TO PHQ9 QUESTIONS 1 AND 2: 0
2. FEELING DOWN, DEPRESSED OR HOPELESS: NOT AT ALL

## 2024-11-07 ASSESSMENT — ENCOUNTER SYMPTOMS
HEMATOLOGIC/LYMPHATIC NEGATIVE: 1
GASTROINTESTINAL NEGATIVE: 1
NEUROLOGICAL NEGATIVE: 1
PSYCHIATRIC NEGATIVE: 1
RESPIRATORY NEGATIVE: 1
EYES NEGATIVE: 1
ALLERGIC/IMMUNOLOGIC NEGATIVE: 1
ENDOCRINE NEGATIVE: 1
CONSTITUTIONAL NEGATIVE: 1

## 2024-11-07 ASSESSMENT — ACTIVITIES OF DAILY LIVING (ADL)
NEEDS_ASSIST: NO
SENSORY_SUPPORT_DEVICES: EYEGLASSES
ADL_BEFORE_ADMISSION: INDEPENDENT
MOBILITY_ASSIST_DEVICES: FRONT-WHEELED WALKER;CANE;CRUTCHES
ADL_SHORT_OF_BREATH: NO
RECENT_DECLINE_ADL: NO
ADL_SCORE: 12

## 2024-11-07 ASSESSMENT — MINI COG
PATIENT ABLE TO FILL IN THE CLOCK FACE WITH 10 MINUTES PAST 11 O'CLOCK?: YES, CLOCK IS CORRECT
TOTAL SCORE: 5
PATIENT ABLE TO REPEAT THE 3 WORDS GIVEN PREVIOUSLY?: WAS ABLE TO REPEAT BACK 3 WORDS CORRECTLY
PATIENT WAS GIVEN REPEAT BACK WORDS FROM VERSION: 1 - BANANA SUNRISE CHAIR

## 2024-11-07 ASSESSMENT — PAIN SCALES - GENERAL: PAINLEVEL: 0

## 2024-12-01 ENCOUNTER — LAB ENCOUNTER (OUTPATIENT)
Dept: LAB | Facility: HOSPITAL | Age: 59
End: 2024-12-01
Attending: INTERNAL MEDICINE
Payer: MEDICARE

## 2024-12-01 ENCOUNTER — EXTERNAL LAB (OUTPATIENT)
Dept: HEALTH INFORMATION MANAGEMENT | Facility: OTHER | Age: 59
End: 2024-12-01

## 2024-12-01 DIAGNOSIS — M48.061 SPINAL STENOSIS, LUMBAR REGION, WITHOUT NEUROGENIC CLAUDICATION: ICD-10-CM

## 2024-12-01 DIAGNOSIS — C78.6 SECONDARY MALIGNANT NEOPLASM OF RETROPERITONEUM AND PERITONEUM (HCC): ICD-10-CM

## 2024-12-01 DIAGNOSIS — F11.10 OPIOID ABUSE, CONTINUOUS (HCC): ICD-10-CM

## 2024-12-01 DIAGNOSIS — Z12.5 SPECIAL SCREENING FOR MALIGNANT NEOPLASM OF PROSTATE: Primary | ICD-10-CM

## 2024-12-01 LAB
LAB RESULT: NORMAL
PSA SERPL-MCNC: 0.56 NG/ML
PSA SERPL-MCNC: 0.56 NG/ML (ref ?–4)

## 2024-12-01 PROCEDURE — 84153 ASSAY OF PSA TOTAL: CPT

## 2024-12-01 PROCEDURE — 80307 DRUG TEST PRSMV CHEM ANLYZR: CPT

## 2024-12-01 PROCEDURE — 36415 COLL VENOUS BLD VENIPUNCTURE: CPT

## 2024-12-10 DIAGNOSIS — C78.6 MALIGNANT PSEUDOMYXOMA PERITONEI  (CMD): ICD-10-CM

## 2024-12-10 RX ORDER — OXYCODONE HCL 40 MG/1
40 TABLET, FILM COATED, EXTENDED RELEASE ORAL EVERY 12 HOURS SCHEDULED
Qty: 60 TABLET | Refills: 0 | Status: SHIPPED | OUTPATIENT
Start: 2024-12-10 | End: 2025-01-09

## 2025-01-16 DIAGNOSIS — C78.6 MALIGNANT PSEUDOMYXOMA PERITONEI  (CMD): ICD-10-CM

## 2025-01-16 RX ORDER — OXYCODONE HCL 40 MG/1
40 TABLET, FILM COATED, EXTENDED RELEASE ORAL EVERY 12 HOURS SCHEDULED
Qty: 60 TABLET | Refills: 0 | Status: CANCELLED | OUTPATIENT
Start: 2025-01-16 | End: 2025-02-15

## 2025-02-21 DIAGNOSIS — C78.6 MALIGNANT PSEUDOMYXOMA PERITONEI  (CMD): ICD-10-CM

## 2025-02-22 RX ORDER — OXYCODONE HCL 40 MG/1
40 TABLET, FILM COATED, EXTENDED RELEASE ORAL EVERY 12 HOURS SCHEDULED
Qty: 60 TABLET | Refills: 0 | Status: SHIPPED | OUTPATIENT
Start: 2025-02-22 | End: 2025-03-24

## 2025-03-04 ENCOUNTER — APPOINTMENT (OUTPATIENT)
Dept: INTERNAL MEDICINE | Age: 60
End: 2025-03-04

## 2025-03-04 VITALS
RESPIRATION RATE: 18 BRPM | HEIGHT: 72 IN | SYSTOLIC BLOOD PRESSURE: 110 MMHG | BODY MASS INDEX: 34.65 KG/M2 | TEMPERATURE: 97.7 F | WEIGHT: 255.84 LBS | HEART RATE: 65 BPM | DIASTOLIC BLOOD PRESSURE: 73 MMHG | OXYGEN SATURATION: 97 %

## 2025-03-04 DIAGNOSIS — M48.061 SPINAL STENOSIS OF LUMBAR REGION WITHOUT NEUROGENIC CLAUDICATION: ICD-10-CM

## 2025-03-04 DIAGNOSIS — R10.32 LEFT LOWER QUADRANT ABDOMINAL PAIN: ICD-10-CM

## 2025-03-04 DIAGNOSIS — N18.31 STAGE 3A CHRONIC KIDNEY DISEASE  (CMD): ICD-10-CM

## 2025-03-04 DIAGNOSIS — F11.10 OPIOID ABUSE, CONTINUOUS  (CMD): ICD-10-CM

## 2025-03-04 DIAGNOSIS — R53.83 OTHER FATIGUE: ICD-10-CM

## 2025-03-04 DIAGNOSIS — I25.10 CORONARY ARTERY DISEASE INVOLVING NATIVE CORONARY ARTERY OF NATIVE HEART WITHOUT ANGINA PECTORIS: ICD-10-CM

## 2025-03-04 DIAGNOSIS — I42.9 CARDIOMYOPATHY, UNSPECIFIED TYPE  (CMD): ICD-10-CM

## 2025-03-04 DIAGNOSIS — E66.812 CLASS 2 SEVERE OBESITY WITH BODY MASS INDEX (BMI) OF 35 TO 39.9 WITH SERIOUS COMORBIDITY (CMD): ICD-10-CM

## 2025-03-04 DIAGNOSIS — C78.6 MALIGNANT PSEUDOMYXOMA PERITONEI  (CMD): Primary | ICD-10-CM

## 2025-03-04 DIAGNOSIS — E66.01 CLASS 2 SEVERE OBESITY WITH BODY MASS INDEX (BMI) OF 35 TO 39.9 WITH SERIOUS COMORBIDITY (CMD): ICD-10-CM

## 2025-03-04 PROCEDURE — 99214 OFFICE O/P EST MOD 30 MIN: CPT | Performed by: INTERNAL MEDICINE

## 2025-03-04 ASSESSMENT — ENCOUNTER SYMPTOMS
EYES NEGATIVE: 1
PSYCHIATRIC NEGATIVE: 1
CONSTIPATION: 0
APPETITE CHANGE: 0
BLOOD IN STOOL: 0
WEAKNESS: 1
ALLERGIC/IMMUNOLOGIC NEGATIVE: 1
ENDOCRINE NEGATIVE: 1
FATIGUE: 1
HEMATOLOGIC/LYMPHATIC NEGATIVE: 1
ABDOMINAL PAIN: 1
FEVER: 0
RESPIRATORY NEGATIVE: 1
SHORTNESS OF BREATH: 0

## 2025-03-04 ASSESSMENT — PAIN SCALES - GENERAL: PAINLEVEL: 4

## 2025-03-05 ENCOUNTER — EXTERNAL LAB (OUTPATIENT)
Dept: HEALTH INFORMATION MANAGEMENT | Facility: OTHER | Age: 60
End: 2025-03-05

## 2025-03-05 ENCOUNTER — HOSPITAL ENCOUNTER (OUTPATIENT)
Dept: CT IMAGING | Facility: HOSPITAL | Age: 60
Discharge: HOME | End: 2025-03-05
Payer: MEDICARE

## 2025-03-05 ENCOUNTER — LAB ENCOUNTER (OUTPATIENT)
Dept: LAB | Facility: HOSPITAL | Age: 60
End: 2025-03-05
Payer: MEDICARE

## 2025-03-05 DIAGNOSIS — R53.83 FATIGUE: Primary | ICD-10-CM

## 2025-03-05 DIAGNOSIS — C78.6 PSEUDOMYXOMA PERITONEI (HCC): ICD-10-CM

## 2025-03-05 DIAGNOSIS — F11.10 OPIOID ABUSE, CONTINUOUS (HCC): ICD-10-CM

## 2025-03-05 LAB
ALBUMIN SERPL-MCNC: 4.9 G/DL (ref 3.2–4.8)
ALBUMIN SERPL-MCNC: 4.9 G/DL (ref 3.2–4.8)
ALBUMIN/GLOB SERPL: 1.8 {RATIO} (ref 1–2)
ALBUMIN/GLOB SERPL: 1.8 {RATIO} (ref 1–2)
ALP LIVER SERPL-CCNC: 76 U/L
ALP SERPL-CCNC: 76 U/L (ref 45–117)
ALT SERPL-CCNC: 25 U/L
ALT SERPL-CCNC: 25 U/L (ref 10–49)
ANION GAP SERPL CALC-SCNC: 7 MMOL/L (ref 0–18)
ANION GAP SERPL CALC-SCNC: 7 MMOL/L (ref 0–18)
APPEARANCE UR: CLEAR
AST SERPL-CCNC: 22 U/L
AST SERPL-CCNC: 22 U/L (ref ?–34)
BASOPHILS # BLD AUTO: 0.07 X10(3) UL (ref 0–0.2)
BASOPHILS # BLD: 0.07 X10(3) UL (ref 0–0.2)
BASOPHILS NFR BLD AUTO: 0.7 %
BASOPHILS NFR BLD: 0.7 %
BILIRUB SERPL-MCNC: 0.7 MG/DL (ref 0.2–1.1)
BILIRUB SERPL-MCNC: 0.7 MG/DL (ref 0.2–1.1)
BILIRUB UR QL STRIP.AUTO: NEGATIVE
BILIRUB UR QL: NEGATIVE
BUN BLD-MCNC: 15 MG/DL (ref 9–23)
BUN SERPL-MCNC: 15 MG/DL (ref 9–23)
CALCIUM BLD-MCNC: 9.7 MG/DL (ref 8.7–10.6)
CALCIUM SERPL-MCNC: 9.7 MG/DL (ref 8.7–10.6)
CALCULATED OSMO: 292 MOSM/KG (ref 275–295)
CHLORIDE SERPL-SCNC: 105 MMOL/L (ref 98–112)
CHLORIDE SERPL-SCNC: 105 MMOL/L (ref 98–112)
CLARITY UR REFRACT.AUTO: CLEAR
CO2 SERPL-SCNC: 28 MMOL/L (ref 21–32)
CO2 SERPL-SCNC: 28 MMOL/L (ref 21–32)
COLOR UR AUTO: YELLOW
COLOR UR: YELLOW
CREAT BLD-MCNC: 1.66 MG/DL
CREAT BLD-MCNC: 1.8 MG/DL
CREAT SERPL-MCNC: 1.66 MG/DL (ref 0.7–1.3)
CREAT UR-MCNC: 183.3 MG/DL
CREAT UR-SCNC: 183.3 MG/DL
EGFRCR SERPLBLD CKD-EPI 2021: 43 ML/MIN/1.73M2 (ref 60–?)
EGFRCR SERPLBLD CKD-EPI 2021: 47 ML/MIN/1.73M2 (ref 60–?)
EOSINOPHIL # BLD AUTO: 0.47 X10(3) UL (ref 0–0.7)
EOSINOPHIL # BLD: 0.47 X10(3) UL (ref 0–0.7)
EOSINOPHIL NFR BLD AUTO: 4.4 %
EOSINOPHIL NFR BLD: 4.4 %
ERYTHROCYTE [DISTWIDTH] IN BLOOD BY AUTOMATED COUNT: 12.5 %
ERYTHROCYTE [DISTWIDTH] IN BLOOD: 12.5 %
FASTING STATUS PATIENT QL REPORTED: YES
GFR SERPLBLD SCHWARTZ-ARVRAT: 47 ML/MIN/1.73M2
GLOBULIN PLAS-MCNC: 2.7 G/DL (ref 2–3.5)
GLOBULIN SER-MCNC: 2.7 G/DL (ref 2–3.5)
GLUCOSE BLD-MCNC: 116 MG/DL (ref 70–99)
GLUCOSE SERPL-MCNC: 116 MG/DL (ref 70–99)
GLUCOSE UR STRIP.AUTO-MCNC: NORMAL MG/DL
GLUCOSE UR-MCNC: NORMAL MG/DL
HCT VFR BLD AUTO: 43.2 %
HCT VFR BLD CALC: 43.2 % (ref 39–53)
HGB BLD-MCNC: 14.9 G/DL
HGB BLD-MCNC: 14.9 G/DL (ref 13–17.5)
HGB UR QL: NEGATIVE
IMM GRANULOCYTES # BLD AUTO: 0.02 X10(3) UL (ref 0–1)
IMM GRANULOCYTES # BLD: 0.02 X10(3) UL (ref 0–1)
IMM GRANULOCYTES NFR BLD: 0.2 %
IMM GRANULOCYTES NFR BLD: 0.2 %
KETONES UR STRIP.AUTO-MCNC: NEGATIVE MG/DL
KETONES UR-MCNC: NEGATIVE MG/DL
LENGTH OF FAST TIME PATIENT: YES H
LEUKOCYTE ESTERASE UR QL STRIP.AUTO: NEGATIVE
LEUKOCYTE ESTERASE UR QL STRIP: NEGATIVE
LYMPHOCYTES # BLD AUTO: 2.77 X10(3) UL (ref 1–4)
LYMPHOCYTES # BLD: 2.77 X10(3) UL (ref 1–4)
LYMPHOCYTES NFR BLD AUTO: 26.2 %
LYMPHOCYTES NFR BLD: 26.2 %
MCH RBC QN AUTO: 32.5 PG (ref 26–34)
MCH RBC QN AUTO: 32.5 PG (ref 26–34)
MCHC RBC AUTO-ENTMCNC: 34.5 G/DL (ref 31–37)
MCHC RBC AUTO-ENTMCNC: 34.5 G/DL (ref 31–37)
MCV RBC AUTO: 94.3 FL
MCV RBC AUTO: 94.3 FL (ref 80–100)
MICROALBUMIN UR-MCNC: 4 MG/DL
MICROALBUMIN UR-MCNC: 4 MG/DL
MICROALBUMIN/CREAT 24H UR-RTO: 21.8 UG/MG (ref ?–30)
MICROALBUMIN/CREAT UR: 21.8 UG/MG
MONOCYTES # BLD AUTO: 1.03 X10(3) UL (ref 0.1–1)
MONOCYTES # BLD: 1.03 X10(3) UL (ref 0.1–1)
MONOCYTES NFR BLD AUTO: 9.7 %
MONOCYTES NFR BLD: 9.7 %
NEUTROPHILS # BLD AUTO: 6.22 X10 (3) UL (ref 1.5–7.7)
NEUTROPHILS # BLD AUTO: 6.22 X10(3) UL (ref 1.5–7.7)
NEUTROPHILS # BLD: 6.22 X10(3) UL (ref 1.5–7.7)
NEUTROPHILS NFR BLD AUTO: 58.8 %
NEUTROPHILS NFR BLD: 58.8 %
NITRITE UR QL STRIP.AUTO: NEGATIVE
NITRITE UR QL: NEGATIVE
OSMOLALITY SERPL CALC.SUM OF ELEC: 292 MOSM/KG (ref 275–295)
PH UR STRIP.AUTO: 6.5 [PH] (ref 5–8)
PH UR: 6.5 [PH] (ref 5–8)
PLATELET # BLD AUTO: 332 10(3)UL (ref 150–450)
PLATELET # BLD: 332 10(3)UL (ref 150–450)
POTASSIUM SERPL-SCNC: 4.4 MMOL/L (ref 3.5–5.1)
POTASSIUM SERPL-SCNC: 4.4 MMOL/L (ref 3.5–5.1)
PROT SERPL-MCNC: 7.6 G/DL (ref 5.7–8.2)
PROT SERPL-MCNC: 7.6 G/DL (ref 5.7–8.2)
PROT UR QL: 20 MG/DL
PROT UR STRIP.AUTO-MCNC: 20 MG/DL
RBC # BLD AUTO: 4.58 X10(6)UL
RBC # BLD: 4.58 X10(6)UL (ref 4.3–5.7)
RBC UR QL AUTO: NEGATIVE
SODIUM SERPL-SCNC: 140 MMOL/L (ref 136–145)
SODIUM SERPL-SCNC: 140 MMOL/L (ref 136–145)
SP GR UR STRIP.AUTO: 1.03 (ref 1–1.03)
SP GR UR: 1.03 (ref 1–1.03)
TESTOST SERPL-MCNC: 250.75 NG/DL
TESTOST SERPL-MCNC: 250.75 NG/DL (ref 187.72–684.19)
TSH SERPL-ACNC: 5.18 UIU/ML (ref 0.55–4.78)
TSI SER-ACNC: 5.18 UIU/ML (ref 0.55–4.78)
UROBILINOGEN UR QL: NORMAL MG/DL
UROBILINOGEN UR STRIP.AUTO-MCNC: NORMAL MG/DL
WBC # BLD AUTO: 10.6 X10(3) UL (ref 4–11)
WBC # BLD: 10.6 X10(3) UL (ref 4–11)

## 2025-03-05 PROCEDURE — 81001 URINALYSIS AUTO W/SCOPE: CPT

## 2025-03-05 PROCEDURE — 85025 COMPLETE CBC W/AUTO DIFF WBC: CPT

## 2025-03-05 PROCEDURE — 82043 UR ALBUMIN QUANTITATIVE: CPT

## 2025-03-05 PROCEDURE — 82565 ASSAY OF CREATININE: CPT

## 2025-03-05 PROCEDURE — 82570 ASSAY OF URINE CREATININE: CPT

## 2025-03-05 PROCEDURE — 74177 CT ABD & PELVIS W/CONTRAST: CPT

## 2025-03-05 PROCEDURE — 80053 COMPREHEN METABOLIC PANEL: CPT

## 2025-03-05 PROCEDURE — 36415 COLL VENOUS BLD VENIPUNCTURE: CPT

## 2025-03-05 PROCEDURE — 84403 ASSAY OF TOTAL TESTOSTERONE: CPT

## 2025-03-05 PROCEDURE — 84443 ASSAY THYROID STIM HORMONE: CPT

## 2025-03-07 ENCOUNTER — TELEPHONE (OUTPATIENT)
Dept: SURGERY | Facility: CLINIC | Age: 60
End: 2025-03-07

## 2025-03-07 DIAGNOSIS — C78.6 PSEUDOMYXOMA PERITONEI (HCC): Primary | ICD-10-CM

## 2025-03-07 NOTE — TELEPHONE ENCOUNTER
Called patient in regard to recent CT Scan. Stable pseudomyxoma, no increase in size or new deposits. Discussed that patient was not due to follow up imaging with our clinic until October. He states the intention for the CT scan was due to his PCP concern for hernia causing left sided flank pain. Will forward CT findings to PCP with no evidence of hernia. Order placed for pseudomyxoma surveillance imaging in October.     MELQUIADES Nunez

## 2025-03-17 ENCOUNTER — E-ADVICE (OUTPATIENT)
Dept: INTERNAL MEDICINE | Age: 60
End: 2025-03-17

## 2025-03-17 DIAGNOSIS — E29.1 PRIMARY MALE HYPOGONADISM: Primary | ICD-10-CM

## 2025-03-19 ENCOUNTER — E-ADVICE (OUTPATIENT)
Dept: INTERNAL MEDICINE | Age: 60
End: 2025-03-19

## 2025-03-19 ENCOUNTER — TELEPHONE (OUTPATIENT)
Dept: INTERNAL MEDICINE | Age: 60
End: 2025-03-19

## 2025-03-19 RX ORDER — LEVOTHYROXINE SODIUM 50 UG/1
50 TABLET ORAL DAILY
Qty: 30 TABLET | Refills: 5 | Status: SHIPPED | OUTPATIENT
Start: 2025-03-19 | End: 2025-04-18

## 2025-03-27 DIAGNOSIS — C78.6 MALIGNANT PSEUDOMYXOMA PERITONEI  (CMD): ICD-10-CM

## 2025-03-27 RX ORDER — OXYCODONE HCL 40 MG/1
40 TABLET, FILM COATED, EXTENDED RELEASE ORAL EVERY 12 HOURS SCHEDULED
Qty: 60 TABLET | Refills: 0 | Status: SHIPPED | OUTPATIENT
Start: 2025-03-27 | End: 2025-04-26

## 2025-04-30 ENCOUNTER — E-ADVICE (OUTPATIENT)
Dept: INTERNAL MEDICINE | Age: 60
End: 2025-04-30

## 2025-04-30 DIAGNOSIS — C78.6 MALIGNANT PSEUDOMYXOMA PERITONEI  (CMD): ICD-10-CM

## 2025-04-30 RX ORDER — OXYCODONE HCL 40 MG/1
40 TABLET, FILM COATED, EXTENDED RELEASE ORAL EVERY 12 HOURS SCHEDULED
Qty: 60 TABLET | Refills: 0 | Status: SHIPPED | OUTPATIENT
Start: 2025-04-30 | End: 2025-05-30

## 2025-05-28 DIAGNOSIS — C78.6 MALIGNANT PSEUDOMYXOMA PERITONEI  (CMD): ICD-10-CM

## 2025-05-28 RX ORDER — OXYCODONE HCL 40 MG/1
40 TABLET, FILM COATED, EXTENDED RELEASE ORAL EVERY 12 HOURS SCHEDULED
Qty: 60 TABLET | Refills: 0 | Status: SHIPPED | OUTPATIENT
Start: 2025-05-28 | End: 2025-06-27

## 2025-06-16 ENCOUNTER — APPOINTMENT (OUTPATIENT)
Dept: INTERNAL MEDICINE | Age: 60
End: 2025-06-16

## 2025-06-17 DIAGNOSIS — C78.6 MALIGNANT PSEUDOMYXOMA PERITONEI  (CMD): ICD-10-CM

## 2025-06-17 RX ORDER — OXYCODONE HCL 40 MG/1
40 TABLET, FILM COATED, EXTENDED RELEASE ORAL EVERY 12 HOURS SCHEDULED
Qty: 60 TABLET | Refills: 0 | Status: SHIPPED | OUTPATIENT
Start: 2025-06-17 | End: 2025-07-17

## 2025-07-03 ENCOUNTER — APPOINTMENT (OUTPATIENT)
Dept: INTERNAL MEDICINE | Age: 60
End: 2025-07-03

## 2025-07-03 VITALS
HEIGHT: 72 IN | WEIGHT: 261.91 LBS | SYSTOLIC BLOOD PRESSURE: 105 MMHG | HEART RATE: 81 BPM | RESPIRATION RATE: 18 BRPM | OXYGEN SATURATION: 94 % | DIASTOLIC BLOOD PRESSURE: 72 MMHG | TEMPERATURE: 98.2 F | BODY MASS INDEX: 35.47 KG/M2

## 2025-07-03 DIAGNOSIS — N18.31 STAGE 3A CHRONIC KIDNEY DISEASE  (CMD): ICD-10-CM

## 2025-07-03 DIAGNOSIS — E29.1 PRIMARY MALE HYPOGONADISM: Primary | ICD-10-CM

## 2025-07-03 DIAGNOSIS — F11.10 OPIOID ABUSE, CONTINUOUS (CMD): ICD-10-CM

## 2025-07-03 DIAGNOSIS — E66.812 CLASS 2 SEVERE OBESITY WITH BODY MASS INDEX (BMI) OF 35 TO 39.9 WITH SERIOUS COMORBIDITY (CMD): ICD-10-CM

## 2025-07-03 DIAGNOSIS — C78.6 MALIGNANT PSEUDOMYXOMA PERITONEI  (CMD): ICD-10-CM

## 2025-07-03 DIAGNOSIS — E66.01 CLASS 2 SEVERE OBESITY WITH BODY MASS INDEX (BMI) OF 35 TO 39.9 WITH SERIOUS COMORBIDITY (CMD): ICD-10-CM

## 2025-07-03 DIAGNOSIS — I42.9 CARDIOMYOPATHY, UNSPECIFIED TYPE  (CMD): ICD-10-CM

## 2025-07-03 DIAGNOSIS — R10.32 LEFT LOWER QUADRANT ABDOMINAL PAIN: ICD-10-CM

## 2025-07-03 DIAGNOSIS — M48.061 SPINAL STENOSIS OF LUMBAR REGION WITHOUT NEUROGENIC CLAUDICATION: ICD-10-CM

## 2025-07-03 ASSESSMENT — PAIN SCALES - GENERAL: PAINLEVEL_OUTOF10: 0

## 2025-07-18 DIAGNOSIS — C78.6 MALIGNANT PSEUDOMYXOMA PERITONEI  (CMD): ICD-10-CM

## 2025-07-19 RX ORDER — OXYCODONE HCL 40 MG/1
40 TABLET, FILM COATED, EXTENDED RELEASE ORAL EVERY 12 HOURS SCHEDULED
Qty: 60 TABLET | Refills: 0 | Status: SHIPPED | OUTPATIENT
Start: 2025-07-19 | End: 2025-08-18

## 2025-08-25 DIAGNOSIS — C78.6 MALIGNANT PSEUDOMYXOMA PERITONEI  (CMD): ICD-10-CM

## 2025-08-25 RX ORDER — OXYCODONE HCL 40 MG/1
40 TABLET, FILM COATED, EXTENDED RELEASE ORAL EVERY 12 HOURS SCHEDULED
Qty: 60 TABLET | Refills: 0 | Status: SHIPPED | OUTPATIENT
Start: 2025-08-25 | End: 2025-09-24

## (undated) DIAGNOSIS — D37.9 NEOPLASM OF UNCERTAIN BEHAVIOR OF DIGESTIVE ORGAN, UNSPECIFIED: ICD-10-CM

## (undated) DIAGNOSIS — C78.6 PSEUDOMYXOMA PERITONEI (HCC): Primary | ICD-10-CM

## (undated) DIAGNOSIS — C48.1 MALIGNANT NEOPLASM OF SPECIFIED PARTS OF PERITONEUM (HCC): ICD-10-CM

## (undated) DIAGNOSIS — C7A.020 MALIGNANT CARCINOID TUMOR OF THE APPENDIX (HCC): ICD-10-CM

## (undated) DEVICE — ENDOPATH ECHELON ENDOSCOPIC LINEAR CUTTER RELOADS, BLUE, 60MM: Brand: ECHELON ENDOPATH

## (undated) DEVICE — SUTURE ETHILON 4-0 PS-2

## (undated) DEVICE — GOWN,SIRUS,FABRIC-REINFORCED,X-LARGE: Brand: MEDLINE

## (undated) DEVICE — STRYKER HARMONIC 23CM REPRCSED

## (undated) DEVICE — 40580 - THE PINK PAD - ADVANCED TRENDELENBURG POSITIONING KIT: Brand: 40580 - THE PINK PAD - ADVANCED TRENDELENBURG POSITIONING KIT

## (undated) DEVICE — GOWN,PREVENTION PLUS,XLARGE,STERILE: Brand: MEDLINE

## (undated) DEVICE — DRAIN RELIAVAC 100CC

## (undated) DEVICE — GLOVE SURG NEOLON SZ 7

## (undated) DEVICE — BLANKET HYPOTHERMIA ADULT

## (undated) DEVICE — NEEDLE ELECTRODE: Brand: EDGE

## (undated) DEVICE — GLOVE SURG NEOLON SZ 6-1/2

## (undated) DEVICE — CHEMOTHERAPY CONTAINER,SLIDE LID, YELLOW: Brand: SHARPSAFETY

## (undated) DEVICE — PROCEDURE KIT FOR HT-2000

## (undated) DEVICE — POOLE SUCTION HANDLE: Brand: CARDINAL HEALTH

## (undated) DEVICE — DRAPE EQUIPMENT INTRATEMP THER

## (undated) DEVICE — SUTURE ETHILON 3-0 PS-2

## (undated) DEVICE — FLOTRAC SENSOR (84" / 213CM): Brand: FLOTRAC

## (undated) DEVICE — ECHELON FLEX POWERED PLUS COMPACT ARTICULATING ENDOSCOPIC LINEAR CUTTER, 60MM: Brand: ECHELON FLEX

## (undated) DEVICE — DRAIN ROUND HUBLESS 15FR

## (undated) DEVICE — 12CM IQ SERRATED LARGE: Brand: SONOPET IQ

## (undated) DEVICE — IRRIGATION SUCTION CASSETTE: Brand: SONOPET IQ

## (undated) DEVICE — HEX-LOCKING BLADE ELECTRODE: Brand: EDGE

## (undated) DEVICE — SUTURE PROLENE 4-0 RB-1

## (undated) DEVICE — SUTURE PROLENE 3-0 SH

## (undated) DEVICE — SUTURE SILK 3-0 SH

## (undated) DEVICE — PROXIMATE RELOADABLE LINEAR STAPLER, 30MM: Brand: PROXIMATE

## (undated) DEVICE — SOL  .9 1000ML BTL

## (undated) DEVICE — SUTURE SILK 0 FSL

## (undated) DEVICE — SUTURE SILK 0

## (undated) DEVICE — INTENDED TO BE USED TO OCCLUDE, RETRACT AND IDENTIFY ARTERIES, VEINS, TENDONS AND NERVES IN SURGICAL PROCEDURES: Brand: STERION®  VESSEL LOOP

## (undated) DEVICE — GOWN,PREVENTION PLUS,LARGE,STERILE: Brand: MEDLINE

## (undated) DEVICE — ARGON HANDSET: Brand: VALLEYLAB

## (undated) DEVICE — COVER,MAYO STAND,STERILE: Brand: MEDLINE

## (undated) DEVICE — ABSORBABLE HEMOSTAT (OXIDIZED REGENERATED CELLULOSE, U.S.P.): Brand: SURGICEL

## (undated) DEVICE — SUTURE PDS II 1 TP-1

## (undated) DEVICE — TOWEL OR BLU 16X26 STRL

## (undated) DEVICE — MEDI-VAC NON-CONDUCTIVE SUCTION TUBING: Brand: CARDINAL HEALTH

## (undated) DEVICE — HIPEC PACK: Brand: MEDLINE INDUSTRIES, INC.

## (undated) DEVICE — ENDOPATH ECHELON ENDOSCOPIC LINEAR CUTTER RELOADS, GREEN, 60MM: Brand: ECHELON ENDOPATH

## (undated) DEVICE — PAD SACRAL SPAN AID

## (undated) DEVICE — SUTURE PROLENE 2-0 MH

## (undated) DEVICE — REM POLYHESIVE ADULT PATIENT RETURN ELECTRODE: Brand: VALLEYLAB

## (undated) DEVICE — KENDALL SCD EXPRESS SLEEVES, KNEE LENGTH, MEDIUM: Brand: KENDALL SCD

## (undated) NOTE — LETTER
AUTHORIZATION FOR SURGICAL OPERATION OR OTHER PROCEDURE    1. I hereby authorize Dr. Luisa Flores and the [facility] staff assigned to my case to perform the following operation and/or procedure at the [facility]:     Primary osteoarthritis of right hip under US guidance     2. My physician has explained the nature and purpose of the operation or other procedure, possible alternative methods of treatment, the risks involved, and the possibility of complication to me. I acknowledge that no guarantee has been made as to the result that may be obtained. 3.  I recognize that, during the course of this operation, or other procedure, unforseen conditions may necessitate additional or different procedure than those listed above. I, therefore, further authorize and request that the above named physician, his/her physician assistants or designees perform such procedures as are, in his/her professional opinion, necessary and desirable. 4.  Any tissue or organs removed in the operation or other procedure may be disposed of by and at the discretion of the [facility] staff and [facility]. 5.  I understand that in the event of a medical emergency, I will be transported by local paramedics to [facility] or other hospital emergency department. 6.  I certify that I have read and fully understand the above consent to operation and/or other procedure. 7.  I acknowledge that my physician has explained sedation/analgesia administration to me including the risks and benefits. I consent to the administration of sedation/analgesia as may be necessary or desirable in the judgement of my physician. Witness signature: ___________________________________________________ Date:  ______/______/_____                    Time:  ________ A. M.  P.M.        Patient Name:  Misha Officer [facility]  9/16/1965  [id number]         Patient signature: ___________________________________________________             Statement of Physician  My signature below affirms that prior to the time of the procedure, I have explained to the patient and/or his/her guardian, the risks and benefits involved in the proposed treatment and any reasonable alternative to the proposed treatment. I have also explained the risks and benefits involved in the refusal of the proposed treatment and have answered the patient's questions.                         Date:  ______/______/_______  Provider                      Signature:  __________________________________________________________       Time:  ___________ AKHANH OLSEN

## (undated) NOTE — ED AVS SNAPSHOT
Dimitrios Roberto   MRN: [id number]    Department:  [facility] Emergency Department   Date of Visit:  12/15/2019           Disclosure     Insurance plans vary and the physician(s) referred by the ER may not be covered by your plan.  Please contac tell this physician (or your personal doctor if your instructions are to return to your personal doctor) about any new or lasting problems. The primary care or specialist physician will see patients referred from the [facility] Emergency Department.  Nikolay Evans

## (undated) NOTE — MR AVS SNAPSHOT
[facility], [place] [id number] [facility], [address] [place] [address] [phone]               Thank you for choosing us for your health care visit with Marisela Sahni MD.  We are glad to serve you and happy to provide you with this ? To best provide you care, patients receiving routine medications need to be seen at least once a year.  protocol for controlled substances:  Written prescriptions      ?  EFFECTIVE April 1, 2017 PATIENTS MUST  THEIR OWN NARCOTIC PRESCRIPT 132/76 mmHg 85 72\" 275 lb 37.29 kg/m2         Current Medications          This list is accurate as of: 5/12/17 11:59 PM.  Always use your most recent med list.                CUSTOM MEDICATION   CMPD Flurbiprofen 3.5%, Cyclobenzaprine 0.5%, Gabapentin 1

## (undated) NOTE — LETTER
[facility], BinurubenMarcela Larios   Date:   11/22/2023     Name:   Nir Aaron    YOB: 1965   MRN:   [id number]       WHERE IS YOUR PAIN NOW? Janeen the areas on your body where you feel the described sensations. Use the appropriate symbol. Toshia Johnson the areas of radiation. Include all affected areas. Just to complete the picture, please draw in the face. ACHE:  ^ ^ ^   NUMBNESS:  0000   PINS & NEEDLES:  = = = =                              ^ ^ ^                       0000              = = = =                                    ^ ^ ^                       0000            = = = =      BURNING:  XXXX   STABBING: ////                  XXXX                ////                         XXXX          ////     Please janeen the line below indicating your degree of pain right now  with 0 being no pain 10 being the worst pain possible.                                          0             1             2              3             4              5              6              7             8             9             10         Patient Signature:

## (undated) NOTE — MR AVS SNAPSHOT
[facility] [address], [address] [place] [address]                    After Visit Summary   5/3/2017    Itz Huggins    MRN: [id number]           Visit Information        Provider Department Dept Phone    5/ To-Do List     Future Appointments Provider Department Dept Phone    5/12/2017 8:30 AM Collin Pool MD [facility], [address]    Please arrive 30 minutes prior to your appointment, bring insurance card and pictur